# Patient Record
Sex: FEMALE | Race: WHITE | NOT HISPANIC OR LATINO | Employment: OTHER | URBAN - METROPOLITAN AREA
[De-identification: names, ages, dates, MRNs, and addresses within clinical notes are randomized per-mention and may not be internally consistent; named-entity substitution may affect disease eponyms.]

---

## 2017-11-15 ENCOUNTER — CONVERSION ENCOUNTER (OUTPATIENT)
Dept: GENERAL RADIOLOGY | Facility: HOSPITAL | Age: 67
End: 2017-11-15

## 2018-08-16 ENCOUNTER — CONVERSION ENCOUNTER (OUTPATIENT)
Dept: NEUROLOGY | Facility: CLINIC | Age: 68
End: 2018-08-16

## 2018-08-16 ENCOUNTER — OFFICE VISIT CONVERTED (OUTPATIENT)
Dept: NEUROSURGERY | Facility: CLINIC | Age: 68
End: 2018-08-16
Attending: PHYSICIAN ASSISTANT

## 2018-08-29 ENCOUNTER — OFFICE VISIT CONVERTED (OUTPATIENT)
Dept: NEUROSURGERY | Facility: CLINIC | Age: 68
End: 2018-08-29
Attending: PHYSICIAN ASSISTANT

## 2018-09-10 ENCOUNTER — OFFICE VISIT CONVERTED (OUTPATIENT)
Dept: NEUROSURGERY | Facility: CLINIC | Age: 68
End: 2018-09-10
Attending: NEUROLOGICAL SURGERY

## 2018-12-12 ENCOUNTER — CONVERSION ENCOUNTER (OUTPATIENT)
Dept: GENERAL RADIOLOGY | Facility: HOSPITAL | Age: 68
End: 2018-12-12

## 2019-04-02 ENCOUNTER — HOSPITAL ENCOUNTER (OUTPATIENT)
Dept: LAB | Facility: HOSPITAL | Age: 69
Discharge: HOME | End: 2019-04-02
Attending: NURSE PRACTITIONER

## 2019-04-05 ENCOUNTER — HOSPITAL ENCOUNTER (OUTPATIENT)
Dept: OTHER | Facility: HOSPITAL | Age: 69
Discharge: HOME | End: 2019-04-05
Attending: NURSE PRACTITIONER

## 2019-04-09 LAB
AMOXICILLIN+CLAV SUSC ISLT: <=2
AMPICILLIN SUSC ISLT: <=2
AMPICILLIN+SULBAC SUSC ISLT: <=2
BACTERIA UR CULT: ABNORMAL
CEFAZOLIN SUSC ISLT: <=4
CEFEPIME SUSC ISLT: <=1
CEFTAZIDIME SUSC ISLT: <=1
CEFTRIAXONE SUSC ISLT: <=1
CEFUROXIME ORAL SUSC ISLT: 4
CEFUROXIME PARENTER SUSC ISLT: 4
CIPROFLOXACIN SUSC ISLT: <=0.25
ERTAPENEM SUSC ISLT: <=0.5
GENTAMICIN SUSC ISLT: <=1
LEVOFLOXACIN SUSC ISLT: <=0.12
NITROFURANTOIN SUSC ISLT: <=16
TETRACYCLINE SUSC ISLT: <=1
TMP SMX SUSC ISLT: <=20
TOBRAMYCIN SUSC ISLT: <=1

## 2019-07-30 ENCOUNTER — HOSPITAL ENCOUNTER (OUTPATIENT)
Dept: GENERAL RADIOLOGY | Facility: HOSPITAL | Age: 69
Discharge: HOME | End: 2019-07-30
Attending: NURSE PRACTITIONER

## 2019-07-30 ENCOUNTER — HOSPITAL ENCOUNTER (OUTPATIENT)
Dept: LAB | Facility: HOSPITAL | Age: 69
Discharge: HOME | End: 2019-07-30
Attending: NURSE PRACTITIONER

## 2019-07-30 LAB
25(OH)D3 SERPL-MCNC: 75.7 NG/ML (ref 30–100)
ALBUMIN SERPL-MCNC: 4.3 G/DL (ref 3.5–5)
ALBUMIN/GLOB SERPL: 1.4 {RATIO} (ref 1.4–2.6)
ALP SERPL-CCNC: 67 U/L (ref 43–160)
ALT SERPL-CCNC: 31 U/L (ref 10–40)
ANION GAP SERPL CALC-SCNC: 20 MMOL/L (ref 8–19)
AST SERPL-CCNC: 25 U/L (ref 15–50)
BASOPHILS # BLD AUTO: 0.06 10*3/UL (ref 0–0.2)
BASOPHILS NFR BLD AUTO: 1 % (ref 0–3)
BILIRUB SERPL-MCNC: 0.47 MG/DL (ref 0.2–1.3)
BUN SERPL-MCNC: 19 MG/DL (ref 5–25)
BUN/CREAT SERPL: 20 {RATIO} (ref 6–20)
CALCIUM SERPL-MCNC: 9.8 MG/DL (ref 8.7–10.4)
CHLORIDE SERPL-SCNC: 89 MMOL/L (ref 99–111)
CHOLEST SERPL-MCNC: 173 MG/DL (ref 107–200)
CHOLEST/HDLC SERPL: 3.8 {RATIO} (ref 3–6)
CONV ABS IMM GRAN: 0.02 10*3/UL (ref 0–0.2)
CONV CO2: 27 MMOL/L (ref 22–32)
CONV IMMATURE GRAN: 0.3 % (ref 0–1.8)
CONV TOTAL PROTEIN: 7.4 G/DL (ref 6.3–8.2)
CREAT UR-MCNC: 0.95 MG/DL (ref 0.5–0.9)
DEPRECATED RDW RBC AUTO: 40.8 FL (ref 36.4–46.3)
EOSINOPHIL # BLD AUTO: 0.37 10*3/UL (ref 0–0.7)
EOSINOPHIL # BLD AUTO: 5.9 % (ref 0–7)
ERYTHROCYTE [DISTWIDTH] IN BLOOD BY AUTOMATED COUNT: 13.1 % (ref 11.7–14.4)
EST. AVERAGE GLUCOSE BLD GHB EST-MCNC: 120 MG/DL
FOLATE SERPL-MCNC: 12.8 NG/ML (ref 4.8–20)
GFR SERPLBLD BASED ON 1.73 SQ M-ARVRAT: >60 ML/MIN/{1.73_M2}
GLOBULIN UR ELPH-MCNC: 3.1 G/DL (ref 2–3.5)
GLUCOSE SERPL-MCNC: 104 MG/DL (ref 65–99)
HBA1C MFR BLD: 13.9 G/DL (ref 12–16)
HBA1C MFR BLD: 5.8 % (ref 3.5–5.7)
HCT VFR BLD AUTO: 40.7 % (ref 37–47)
HDLC SERPL-MCNC: 45 MG/DL (ref 40–60)
IRON SATN MFR SERPL: 19 % (ref 20–55)
IRON SERPL-MCNC: 79 UG/DL (ref 60–170)
LDLC SERPL CALC-MCNC: 113 MG/DL (ref 70–100)
LYMPHOCYTES # BLD AUTO: 1.8 10*3/UL (ref 1–5)
MCH RBC QN AUTO: 29.3 PG (ref 27–31)
MCHC RBC AUTO-ENTMCNC: 34.2 G/DL (ref 33–37)
MCV RBC AUTO: 85.9 FL (ref 81–99)
MONOCYTES # BLD AUTO: 0.73 10*3/UL (ref 0.2–1.2)
MONOCYTES NFR BLD AUTO: 11.6 % (ref 3–10)
NEUTROPHILS # BLD AUTO: 3.29 10*3/UL (ref 2–8)
NEUTROPHILS NFR BLD AUTO: 52.5 % (ref 30–85)
NRBC CBCN: 0 % (ref 0–0.7)
OSMOLALITY SERPL CALC.SUM OF ELEC: 279 MOSM/KG (ref 273–304)
PLATELET # BLD AUTO: 305 10*3/UL (ref 130–400)
PMV BLD AUTO: 10.8 FL (ref 9.4–12.3)
POTASSIUM SERPL-SCNC: 3.1 MMOL/L (ref 3.5–5.3)
RBC # BLD AUTO: 4.74 10*6/UL (ref 4.2–5.4)
SODIUM SERPL-SCNC: 133 MMOL/L (ref 135–147)
T4 FREE SERPL-MCNC: 1.4 NG/DL (ref 0.9–1.8)
TIBC SERPL-MCNC: 412 UG/DL (ref 245–450)
TRANSFERRIN SERPL-MCNC: 288 MG/DL (ref 250–380)
TRIGL SERPL-MCNC: 75 MG/DL (ref 40–150)
TSH SERPL-ACNC: 1.79 M[IU]/L (ref 0.27–4.2)
URATE SERPL-MCNC: 8.3 MG/DL (ref 2.5–7.5)
VARIANT LYMPHS NFR BLD MANUAL: 28.7 % (ref 20–45)
VIT B12 SERPL-MCNC: 844 PG/ML (ref 211–911)
VLDLC SERPL-MCNC: 15 MG/DL (ref 5–37)
WBC # BLD AUTO: 6.27 10*3/UL (ref 4.8–10.8)

## 2019-08-28 ENCOUNTER — HOSPITAL ENCOUNTER (OUTPATIENT)
Dept: LAB | Facility: HOSPITAL | Age: 69
Discharge: HOME | End: 2019-08-28
Attending: NURSE PRACTITIONER

## 2019-08-28 LAB — POTASSIUM SERPL-SCNC: 3.6 MMOL/L (ref 3.5–5.3)

## 2020-02-03 ENCOUNTER — HOSPITAL ENCOUNTER (OUTPATIENT)
Dept: OTHER | Facility: HOSPITAL | Age: 70
Discharge: HOME | End: 2020-02-03
Attending: FAMILY MEDICINE

## 2020-02-03 ENCOUNTER — OFFICE VISIT CONVERTED (OUTPATIENT)
Dept: FAMILY MEDICINE CLINIC | Age: 70
End: 2020-02-03
Attending: FAMILY MEDICINE

## 2020-02-03 LAB
25(OH)D3 SERPL-MCNC: 90.7 NG/ML (ref 30–100)
ALBUMIN SERPL-MCNC: 4.5 G/DL (ref 3.5–5)
ALBUMIN/GLOB SERPL: 1.3 {RATIO} (ref 1.4–2.6)
ALP SERPL-CCNC: 75 U/L (ref 43–160)
ALT SERPL-CCNC: 23 U/L (ref 10–40)
ANION GAP SERPL CALC-SCNC: 18 MMOL/L (ref 8–19)
AST SERPL-CCNC: 24 U/L (ref 15–50)
BASOPHILS # BLD MANUAL: 0.04 10*3/UL (ref 0–0.2)
BASOPHILS NFR BLD MANUAL: 0.6 % (ref 0–3)
BILIRUB SERPL-MCNC: 0.44 MG/DL (ref 0.2–1.3)
BUN SERPL-MCNC: 13 MG/DL (ref 5–25)
BUN/CREAT SERPL: 14 {RATIO} (ref 6–20)
CALCIUM SERPL-MCNC: 10.1 MG/DL (ref 8.7–10.4)
CHLORIDE SERPL-SCNC: 102 MMOL/L (ref 99–111)
CHOLEST SERPL-MCNC: 180 MG/DL (ref 107–200)
CHOLEST/HDLC SERPL: 4.3 {RATIO} (ref 3–6)
CONV CO2: 25 MMOL/L (ref 22–32)
CONV TOTAL PROTEIN: 8 G/DL (ref 6.3–8.2)
CREAT UR-MCNC: 0.95 MG/DL (ref 0.5–0.9)
DEPRECATED RDW RBC AUTO: 40.8 FL
EOSINOPHIL # BLD MANUAL: 0.28 10*3/UL (ref 0–0.7)
EOSINOPHIL NFR BLD MANUAL: 4.4 % (ref 0–7)
ERYTHROCYTE [DISTWIDTH] IN BLOOD BY AUTOMATED COUNT: 13 % (ref 11.5–14.5)
EST. AVERAGE GLUCOSE BLD GHB EST-MCNC: 120 MG/DL
GFR SERPLBLD BASED ON 1.73 SQ M-ARVRAT: >60 ML/MIN/{1.73_M2}
GLOBULIN UR ELPH-MCNC: 3.5 G/DL (ref 2–3.5)
GLUCOSE SERPL-MCNC: 128 MG/DL (ref 65–99)
GRANS (ABSOLUTE): 3.74 10*3/UL (ref 2–8)
GRANS: 58.4 % (ref 30–85)
HBA1C MFR BLD: 15 G/DL (ref 12–16)
HBA1C MFR BLD: 5.8 % (ref 3.5–5.7)
HCT VFR BLD AUTO: 44.4 % (ref 37–47)
HDLC SERPL-MCNC: 42 MG/DL (ref 40–60)
IMM GRANULOCYTES # BLD: 0 10*3/UL (ref 0–0.54)
IMM GRANULOCYTES NFR BLD: 0 % (ref 0–0.43)
LDLC SERPL CALC-MCNC: 113 MG/DL (ref 70–100)
LYMPHOCYTES # BLD MANUAL: 1.7 10*3/UL (ref 1–5)
LYMPHOCYTES NFR BLD MANUAL: 10.1 % (ref 3–10)
MCH RBC QN AUTO: 29 PG (ref 27–31)
MCHC RBC AUTO-ENTMCNC: 33.8 G/DL (ref 33–37)
MCV RBC AUTO: 85.7 FL (ref 81–99)
MONOCYTES # BLD AUTO: 0.65 10*3/UL (ref 0.2–1.2)
OSMOLALITY SERPL CALC.SUM OF ELEC: 294 MOSM/KG (ref 273–304)
PLATELET # BLD AUTO: 280 10*3/UL (ref 130–400)
PMV BLD AUTO: 10.1 FL (ref 7.4–10.4)
POTASSIUM SERPL-SCNC: 4.1 MMOL/L (ref 3.5–5.3)
RBC # BLD AUTO: 5.18 10*6/UL (ref 4.2–5.4)
SODIUM SERPL-SCNC: 141 MMOL/L (ref 135–147)
TRIGL SERPL-MCNC: 124 MG/DL (ref 40–150)
TSH SERPL-ACNC: 2.5 M[IU]/L (ref 0.27–4.2)
VARIANT LYMPHS NFR BLD MANUAL: 26.5 % (ref 20–45)
VLDLC SERPL-MCNC: 25 MG/DL (ref 5–37)
WBC # BLD AUTO: 6.41 10*3/UL (ref 4.8–10.8)

## 2020-03-02 ENCOUNTER — OFFICE VISIT CONVERTED (OUTPATIENT)
Dept: FAMILY MEDICINE CLINIC | Age: 70
End: 2020-03-02
Attending: FAMILY MEDICINE

## 2020-06-02 ENCOUNTER — HOSPITAL ENCOUNTER (OUTPATIENT)
Dept: LAB | Facility: HOSPITAL | Age: 70
Discharge: HOME | End: 2020-06-02
Attending: FAMILY MEDICINE

## 2020-06-02 LAB
ALBUMIN SERPL-MCNC: 4.4 G/DL (ref 3.5–5)
ALBUMIN/GLOB SERPL: 1.4 {RATIO} (ref 1.4–2.6)
ALP SERPL-CCNC: 51 U/L (ref 43–160)
ALT SERPL-CCNC: 17 U/L (ref 10–40)
ANION GAP SERPL CALC-SCNC: 18 MMOL/L (ref 8–19)
AST SERPL-CCNC: 20 U/L (ref 15–50)
BASOPHILS # BLD AUTO: 0.06 10*3/UL (ref 0–0.2)
BASOPHILS NFR BLD AUTO: 0.9 % (ref 0–3)
BILIRUB SERPL-MCNC: 0.43 MG/DL (ref 0.2–1.3)
BUN SERPL-MCNC: 21 MG/DL (ref 5–25)
BUN/CREAT SERPL: 18 {RATIO} (ref 6–20)
CALCIUM SERPL-MCNC: 10.2 MG/DL (ref 8.7–10.4)
CHLORIDE SERPL-SCNC: 98 MMOL/L (ref 99–111)
CHOLEST SERPL-MCNC: 188 MG/DL (ref 107–200)
CHOLEST/HDLC SERPL: 4.8 {RATIO} (ref 3–6)
CONV ABS IMM GRAN: 0.01 10*3/UL (ref 0–0.2)
CONV CO2: 26 MMOL/L (ref 22–32)
CONV CREATININE URINE, RANDOM: 74.4 MG/DL (ref 10–300)
CONV IMMATURE GRAN: 0.2 % (ref 0–1.8)
CONV MICROALBUM.,U,RANDOM: <12 MG/L (ref 0–20)
CONV TOTAL PROTEIN: 7.5 G/DL (ref 6.3–8.2)
CREAT UR-MCNC: 1.17 MG/DL (ref 0.5–0.9)
DEPRECATED RDW RBC AUTO: 44.4 FL (ref 36.4–46.3)
EOSINOPHIL # BLD AUTO: 0.36 10*3/UL (ref 0–0.7)
EOSINOPHIL # BLD AUTO: 5.5 % (ref 0–7)
ERYTHROCYTE [DISTWIDTH] IN BLOOD BY AUTOMATED COUNT: 13.5 % (ref 11.7–14.4)
EST. AVERAGE GLUCOSE BLD GHB EST-MCNC: 123 MG/DL
GFR SERPLBLD BASED ON 1.73 SQ M-ARVRAT: 48 ML/MIN/{1.73_M2}
GLOBULIN UR ELPH-MCNC: 3.1 G/DL (ref 2–3.5)
GLUCOSE SERPL-MCNC: 137 MG/DL (ref 65–99)
HBA1C MFR BLD: 5.9 % (ref 3.5–5.7)
HCT VFR BLD AUTO: 40.8 % (ref 37–47)
HDLC SERPL-MCNC: 39 MG/DL (ref 40–60)
HGB BLD-MCNC: 13.4 G/DL (ref 12–16)
LDLC SERPL CALC-MCNC: 126 MG/DL (ref 70–100)
LYMPHOCYTES # BLD AUTO: 1.94 10*3/UL (ref 1–5)
LYMPHOCYTES NFR BLD AUTO: 29.6 % (ref 20–45)
MCH RBC QN AUTO: 29.5 PG (ref 27–31)
MCHC RBC AUTO-ENTMCNC: 32.8 G/DL (ref 33–37)
MCV RBC AUTO: 89.9 FL (ref 81–99)
MICROALBUMIN/CREAT UR: 16.1 MG/G{CRE} (ref 0–35)
MONOCYTES # BLD AUTO: 0.77 10*3/UL (ref 0.2–1.2)
MONOCYTES NFR BLD AUTO: 11.7 % (ref 3–10)
NEUTROPHILS # BLD AUTO: 3.42 10*3/UL (ref 2–8)
NEUTROPHILS NFR BLD AUTO: 52.1 % (ref 30–85)
NRBC CBCN: 0 % (ref 0–0.7)
OSMOLALITY SERPL CALC.SUM OF ELEC: 291 MOSM/KG (ref 273–304)
PLATELET # BLD AUTO: 291 10*3/UL (ref 130–400)
PMV BLD AUTO: 10.8 FL (ref 9.4–12.3)
POTASSIUM SERPL-SCNC: 3.8 MMOL/L (ref 3.5–5.3)
RBC # BLD AUTO: 4.54 10*6/UL (ref 4.2–5.4)
SODIUM SERPL-SCNC: 138 MMOL/L (ref 135–147)
TRIGL SERPL-MCNC: 116 MG/DL (ref 40–150)
TSH SERPL-ACNC: 3.12 M[IU]/L (ref 0.27–4.2)
VLDLC SERPL-MCNC: 23 MG/DL (ref 5–37)
WBC # BLD AUTO: 6.56 10*3/UL (ref 4.8–10.8)

## 2020-06-05 ENCOUNTER — OFFICE VISIT CONVERTED (OUTPATIENT)
Dept: FAMILY MEDICINE CLINIC | Age: 70
End: 2020-06-05
Attending: FAMILY MEDICINE

## 2020-09-29 ENCOUNTER — HOSPITAL ENCOUNTER (OUTPATIENT)
Dept: GENERAL RADIOLOGY | Facility: HOSPITAL | Age: 70
Discharge: HOME | End: 2020-09-29
Attending: FAMILY MEDICINE

## 2020-11-25 ENCOUNTER — HOSPITAL ENCOUNTER (OUTPATIENT)
Dept: LAB | Facility: HOSPITAL | Age: 70
Discharge: HOME | End: 2020-11-25
Attending: FAMILY MEDICINE

## 2020-11-25 LAB
ALBUMIN SERPL-MCNC: 4.2 G/DL (ref 3.5–5)
ALBUMIN/GLOB SERPL: 1.2 {RATIO} (ref 1.4–2.6)
ALP SERPL-CCNC: 59 U/L (ref 43–160)
ALT SERPL-CCNC: 16 U/L (ref 10–40)
ANION GAP SERPL CALC-SCNC: 21 MMOL/L (ref 8–19)
AST SERPL-CCNC: 19 U/L (ref 15–50)
BASOPHILS # BLD AUTO: 0.05 10*3/UL (ref 0–0.2)
BASOPHILS NFR BLD AUTO: 0.7 % (ref 0–3)
BILIRUB SERPL-MCNC: 0.34 MG/DL (ref 0.2–1.3)
BUN SERPL-MCNC: 25 MG/DL (ref 5–25)
BUN/CREAT SERPL: 22 {RATIO} (ref 6–20)
CALCIUM SERPL-MCNC: 10.9 MG/DL (ref 8.7–10.4)
CHLORIDE SERPL-SCNC: 98 MMOL/L (ref 99–111)
CHOLEST SERPL-MCNC: 181 MG/DL (ref 107–200)
CHOLEST/HDLC SERPL: 4 {RATIO} (ref 3–6)
CONV ABS IMM GRAN: 0.03 10*3/UL (ref 0–0.2)
CONV CO2: 26 MMOL/L (ref 22–32)
CONV IMMATURE GRAN: 0.4 % (ref 0–1.8)
CONV TOTAL PROTEIN: 7.6 G/DL (ref 6.3–8.2)
CREAT UR-MCNC: 1.13 MG/DL (ref 0.5–0.9)
DEPRECATED RDW RBC AUTO: 44.7 FL (ref 36.4–46.3)
EOSINOPHIL # BLD AUTO: 0.4 10*3/UL (ref 0–0.7)
EOSINOPHIL # BLD AUTO: 5.4 % (ref 0–7)
ERYTHROCYTE [DISTWIDTH] IN BLOOD BY AUTOMATED COUNT: 14 % (ref 11.7–14.4)
EST. AVERAGE GLUCOSE BLD GHB EST-MCNC: 134 MG/DL
GFR SERPLBLD BASED ON 1.73 SQ M-ARVRAT: 49 ML/MIN/{1.73_M2}
GLOBULIN UR ELPH-MCNC: 3.4 G/DL (ref 2–3.5)
GLUCOSE SERPL-MCNC: 121 MG/DL (ref 65–99)
HBA1C MFR BLD: 6.3 % (ref 3.5–5.7)
HCT VFR BLD AUTO: 40 % (ref 37–47)
HDLC SERPL-MCNC: 45 MG/DL (ref 40–60)
HGB BLD-MCNC: 13.5 G/DL (ref 12–16)
LDLC SERPL CALC-MCNC: 108 MG/DL (ref 70–100)
LYMPHOCYTES # BLD AUTO: 2 10*3/UL (ref 1–5)
LYMPHOCYTES NFR BLD AUTO: 26.8 % (ref 20–45)
MCH RBC QN AUTO: 30.1 PG (ref 27–31)
MCHC RBC AUTO-ENTMCNC: 33.8 G/DL (ref 33–37)
MCV RBC AUTO: 89.1 FL (ref 81–99)
MONOCYTES # BLD AUTO: 0.75 10*3/UL (ref 0.2–1.2)
MONOCYTES NFR BLD AUTO: 10.1 % (ref 3–10)
NEUTROPHILS # BLD AUTO: 4.22 10*3/UL (ref 2–8)
NEUTROPHILS NFR BLD AUTO: 56.6 % (ref 30–85)
NRBC CBCN: 0 % (ref 0–0.7)
OSMOLALITY SERPL CALC.SUM OF ELEC: 298 MOSM/KG (ref 273–304)
PLATELET # BLD AUTO: 280 10*3/UL (ref 130–400)
PMV BLD AUTO: 10.7 FL (ref 9.4–12.3)
POTASSIUM SERPL-SCNC: 3.6 MMOL/L (ref 3.5–5.3)
RBC # BLD AUTO: 4.49 10*6/UL (ref 4.2–5.4)
SODIUM SERPL-SCNC: 141 MMOL/L (ref 135–147)
TRIGL SERPL-MCNC: 142 MG/DL (ref 40–150)
TSH SERPL-ACNC: 3.33 M[IU]/L (ref 0.27–4.2)
VLDLC SERPL-MCNC: 28 MG/DL (ref 5–37)
WBC # BLD AUTO: 7.45 10*3/UL (ref 4.8–10.8)

## 2020-12-02 ENCOUNTER — OFFICE VISIT CONVERTED (OUTPATIENT)
Dept: FAMILY MEDICINE CLINIC | Age: 70
End: 2020-12-02
Attending: NURSE PRACTITIONER

## 2021-01-05 ENCOUNTER — HOSPITAL ENCOUNTER (OUTPATIENT)
Dept: LAB | Facility: HOSPITAL | Age: 71
Discharge: HOME | End: 2021-01-05
Attending: NURSE PRACTITIONER

## 2021-01-05 LAB
ALBUMIN SERPL-MCNC: 4.1 G/DL (ref 3.5–5)
ALBUMIN/GLOB SERPL: 1.2 {RATIO} (ref 1.4–2.6)
ALP SERPL-CCNC: 59 U/L (ref 43–160)
ALT SERPL-CCNC: 18 U/L (ref 10–40)
ANION GAP SERPL CALC-SCNC: 17 MMOL/L (ref 8–19)
AST SERPL-CCNC: 20 U/L (ref 15–50)
BILIRUB SERPL-MCNC: 0.33 MG/DL (ref 0.2–1.3)
BUN SERPL-MCNC: 28 MG/DL (ref 5–25)
BUN/CREAT SERPL: 25 {RATIO} (ref 6–20)
CALCIUM SERPL-MCNC: 10.3 MG/DL (ref 8.7–10.4)
CHLORIDE SERPL-SCNC: 96 MMOL/L (ref 99–111)
CHOLEST SERPL-MCNC: 124 MG/DL (ref 107–200)
CHOLEST/HDLC SERPL: 2.9 {RATIO} (ref 3–6)
CONV CO2: 27 MMOL/L (ref 22–32)
CONV TOTAL PROTEIN: 7.6 G/DL (ref 6.3–8.2)
CREAT UR-MCNC: 1.13 MG/DL (ref 0.5–0.9)
GFR SERPLBLD BASED ON 1.73 SQ M-ARVRAT: 49 ML/MIN/{1.73_M2}
GLOBULIN UR ELPH-MCNC: 3.5 G/DL (ref 2–3.5)
GLUCOSE SERPL-MCNC: 124 MG/DL (ref 65–99)
HDLC SERPL-MCNC: 43 MG/DL (ref 40–60)
LDLC SERPL CALC-MCNC: 58 MG/DL (ref 70–100)
OSMOLALITY SERPL CALC.SUM OF ELEC: 289 MOSM/KG (ref 273–304)
POTASSIUM SERPL-SCNC: 3.6 MMOL/L (ref 3.5–5.3)
SODIUM SERPL-SCNC: 136 MMOL/L (ref 135–147)
TRIGL SERPL-MCNC: 115 MG/DL (ref 40–150)
TSH SERPL-ACNC: 2.44 M[IU]/L (ref 0.27–4.2)
VLDLC SERPL-MCNC: 23 MG/DL (ref 5–37)

## 2021-03-15 ENCOUNTER — HOSPITAL ENCOUNTER (OUTPATIENT)
Dept: VACCINE CLINIC | Facility: HOSPITAL | Age: 71
Discharge: HOME | End: 2021-03-15
Attending: INTERNAL MEDICINE

## 2021-03-16 ENCOUNTER — OFFICE VISIT CONVERTED (OUTPATIENT)
Dept: FAMILY MEDICINE CLINIC | Age: 71
End: 2021-03-16
Attending: NURSE PRACTITIONER

## 2021-04-05 ENCOUNTER — HOSPITAL ENCOUNTER (OUTPATIENT)
Dept: VACCINE CLINIC | Facility: HOSPITAL | Age: 71
Discharge: HOME | End: 2021-04-05
Attending: INTERNAL MEDICINE

## 2021-05-04 ENCOUNTER — HOSPITAL ENCOUNTER (OUTPATIENT)
Dept: LAB | Facility: HOSPITAL | Age: 71
Discharge: HOME | End: 2021-05-04
Attending: NURSE PRACTITIONER

## 2021-05-04 LAB
EST. AVERAGE GLUCOSE BLD GHB EST-MCNC: 117 MG/DL
HBA1C MFR BLD: 5.7 % (ref 3.5–5.7)
TSH SERPL-ACNC: 3.09 M[IU]/L (ref 0.27–4.2)

## 2021-05-16 VITALS
WEIGHT: 232 LBS | DIASTOLIC BLOOD PRESSURE: 88 MMHG | SYSTOLIC BLOOD PRESSURE: 152 MMHG | BODY MASS INDEX: 43.8 KG/M2 | HEIGHT: 61 IN

## 2021-05-16 VITALS
DIASTOLIC BLOOD PRESSURE: 72 MMHG | SYSTOLIC BLOOD PRESSURE: 137 MMHG | BODY MASS INDEX: 42.48 KG/M2 | HEIGHT: 61 IN | HEART RATE: 86 BPM | WEIGHT: 225 LBS

## 2021-05-16 VITALS
HEART RATE: 77 BPM | WEIGHT: 225 LBS | HEIGHT: 61 IN | SYSTOLIC BLOOD PRESSURE: 146 MMHG | DIASTOLIC BLOOD PRESSURE: 79 MMHG | BODY MASS INDEX: 42.48 KG/M2

## 2021-05-18 NOTE — PROGRESS NOTES
Kim Owen  1950     Office/Outpatient Visit    Visit Date: Wed, Dec 2, 2020 11:10 am    Provider: Judie Palomo N.P. (Assistant: Kaila Montero MA)    Location: [facility]        Electronically signed by Judie Palomo N.P. on  12/02/2020 12:57:06 PM                             Subjective:        CC: Mrs. Owen is a 70 year old White female.  Medicare Wellness;         HPI:           Mrs. Owen is here for a Medicare wellness visit.  The required HRA questions are integrated within this visit note. Family medical history and individual medical/surgical history were reviewed and updated.  A current height, weight, BMI, blood pressure, and pulse were recorded in the vitals section of the note and have been reviewed. Patient's medications, including supplements, were recorded in the chart and reviewed.  Current providers and suppliers were reviewed and updated.          Self-Assessment of Health: She rates her health as good. She rates her confidence of being able to control/manage most of her health problems as very confident. Her physical/emotional health has limited her social activites not at all.  A review of cognitive impairment was performed, including ability to drive a car, manage finances, and any memory changes, and was found to be negative.  A review of functional ability, including bathing, dressing, walking, and urine/bowel continence as well as level of safety was performed and was found to be negative.  Falls Risk: Has not had any falls or only one fall without injury in the past year.  Concerning home safety, she reports that at home she DOES have adequate lighting, a skid resistant shower/tub, grab bars in the bath, handrails on stairs, functioning smoke alarms and absence of throw rugs.          Immunization Status: Would like shingles and influenza today; Physical Activity: She exercises for at least 20 minutes 3 or more days/week.; Type of diet  patient normally eats is described as well-balanced with fruits and vegetables Tobacco: She has never smoked.  Preventative Health updated today           PHQ-9 Depression Screening: Completed form scanned and in chart; Total Score 3     ROS:     CONSTITUTIONAL:  Negative for fatigue and fever.      E/N/T:  Negative for diminished hearing and nasal congestion.      CARDIOVASCULAR:  Negative for chest pain, palpitations, tachycardia, orthopnea, and edema.      RESPIRATORY:  Negative for recent cough, dyspnea and frequent wheezing.      GASTROINTESTINAL:  Negative for abdominal pain, constipation, diarrhea, nausea and vomiting.      GENITOURINARY:  Negative for dysuria and hematuria.      MUSCULOSKELETAL:  Positive for arthralgias (hx OA).   Negative for myalgias.      INTEGUMENTARY/BREAST:  Positive for rash shingles on left shoulder and neck x 1 week , been on Valcylovir from [facility] , area is some better but rash is still there and its painful and burns , never had shingles vaccine.   Negative for atypical mole(s).      NEUROLOGICAL:  Negative for dizziness, memory loss, paresthesias, tremor and weakness.      ENDOCRINE:  Positive for Hx borderline DM, last A1C 11/2020 was 6. 3 , taking Metformin daily and hx Hypothroidism for many years, taking 25mcg LT4 but still fatigued.      PSYCHIATRIC:  Negative for anxiety, depression, and sleep disturbances.          Past Medical History / Family History / Social History:         Last Reviewed on 3/02/2020 09:49 AM by Leonardo Turner    Past Medical History:             PAST MEDICAL HISTORY         Hypertension    Hypothyroidism     Chronic Pain: Back pain;     [place] Spotted Fever     Depression         PREVENTIVE HEALTH MAINTENANCE             BONE DENSITY: was last done 7/30/19 with the following abnormality noted-- Osteopenia     COLORECTAL CANCER SCREENING: Up to date (colonoscopy q10y; sigmoidoscopy q5y; Cologuard q3y) was last done 2012; colonoscopy with normal  results     MAMMOGRAM: Done within last 2 years and results in are chart was last done 20 with normal results     PAP SMEAR: No longer indicated due to age and history         Surgical History:         Hysterectomy: at age 48; endometriosis and menorrhagia;     Joint Replacement: right knee; 2016;         Family History:     Father:  at age 66; Cause of death was DM 2 and alcoholism     Mother:  at age 86; Cause of death was old age         Social History:     Occupation: Retired (Prior occupation: [employer] - night )     Marital Status:      Children: None     Hobbies/Recreation: she enjoys reading and kierra;     Exercise: Primary form of exercise is eliptical.   limited by back pain         Tobacco/Alcohol/Supplements:     Last Reviewed on 2020 08:24 AM by Maria Luz Overton    Tobacco: She has never smoked.  Non-drinker     Caffeine:  She admits to consuming caffeine via coffee ( 2 servings per day ).          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         Current Medications:     Last Reviewed on 6/05/2020 08:25 AM by Maria Luz Overton    Clindamycin  [300mg PRN]    potassium chloride 10 mEq oral tablet, extended release [take 1 tablet (10 meq) by oral route twice a day ]    Triamcinolone Acetate     cyclobenzaprine 5 mg oral tablet [take 1 tablet (5 mg) by oral route prn ]    traMADol 50 mg oral tablet [take 1 tablet (50 mg) by oral route every 4 hours as needed]    gabapentin 100 mg oral capsule [take 1 capsule (100 mg) by oral route prn ]    cholecalciferol (vitamin D3) 125 mcg (5,000 unit) oral tablet    Vitamin C  1000 mg     multivitamin     metoprolol succinate 25 mg oral Tablet, Extended Release 24 hr [Take 1 tablet by mouth once daily]    chlorthalidone 25 mg oral tablet [take 1 tablet (25 mg) by oral route once daily]    losartan 100 mg oral tablet [take 1 tablet (100 mg) by oral route once daily]    buPROPion  mg oral tablet [take 1 tablet (100 mg) by oral route once a day ]    meloxicam 15 mg oral tablet [take 1 tablet (15 mg) by oral route once daily as needed ]    levothyroxine 25 mcg oral tablet [take 1 tablet (25 mcg) by oral route once daily]    metFORMIN 500 mg oral tablet [Take 1 tablet by mouth twice daily]        Objective:        Vitals:         Current: 12/2/2020 11:21:21 AM    Ht:  5 ft, 0 in;  Wt: 229.6 lbs;  BMI: 44.8T: 97.1 F (temporal);  BP: 132/107 mm Hg (left arm, sitting);  P: 85 bpm (right arm (BP Cuff), sitting);  sCr: 1.13 mg/dL;  GFR: 53.58VA: 20/25 OD, 20/40 OS (near, with correction)        Repeat:     11:22:53 AM  BP:   137/79mm Hg (right arm, sitting, Pulse 79)     Exams:     PHYSICAL EXAM:     GENERAL: vital signs recorded - well developed, well nourished;  well groomed;  no apparent distress;     NECK:  supple, full ROM; no thyromegaly; no carotid bruits;     RESPIRATORY: normal respiratory rate and pattern with no distress; normal breath sounds with no rales, rhonchi, wheezes or rubs;     CARDIOVASCULAR: normal rate;  rhythm is regular;  no systolic murmur; no edema;     GASTROINTESTINAL: nontender, nondistended; no hepatosplenomegaly or masses; no bruits;     BREAST/INTEGUMENT: SKIN: no significant rashes or lesions; no suspicious moles; Herpes zoster of left shoulder and neck;     MUSCULOSKELETAL: Generalized joint tenderness with FROM     NEUROLOGIC: GROSSLY INTACT     PSYCHIATRIC:  appropriate affect and demeanor; normal speech pattern; grossly normal memory;         Procedures:     Encounter for immunization    Regarding contraindications to an Influenza vaccine: Denies moderate/severe illness with/without fever; serious reaction to eggs, egg proteins, gentamicin, gelatin, arginine, neomycin or polymixin; serious reaction after recieving previous influenza vaccines; and history of Guillain-Fisher Syndrome.              Assessment:         Z00.00   Encounter for general adult medical examination without abnormal findings       Z13.31   Encounter for screening for depression       Z23   Encounter for immunization       E03.9   Hypothyroidism, unspecified       M85.80   Other specified disorders of bone density and structure, unspecified site       B02.9   Zoster without complications       E78.5   Hyperlipidemia, unspecified       M15.0   Primary generalized (osteo)arthritis       Z79.899   Other long term (current) drug therapy           ORDERS:         Meds Prescribed:       [New Rx] Shingrix (PF) 50 mcg/0.5 mL intramuscular Suspension for Reconstitution [inject 0.5 milliliter (50 mcg) by intramuscular route once], #1 (one) kit, Refills: 1 (one)       [Refilled] levothyroxine 50 mcg oral tablet [take 1 tablet (50 mcg) by oral route once daily], #90 (ninety) tablets, Refills: 1 (one)       [New Rx] valACYclovir 1 gram oral tablet [take 1 tablet (1,000 mg) by oral route 2 times per day], #20 (twenty) tablets, Refills: 1 (one)       [New Rx] calcium carbonate-vitamin D3 500 mg(1,250mg) -200 unit oral tablet [take 1 tablet bid ,  substitute per insurance preference ], #180 (one hundred and eighty) tablets, Refills: 3 (three)       [New Rx] gabapentin 100 mg oral capsule [take 1 capsule (100 mg) by oral route 2 times daily prn pain ], #60 (sixty) capsules, Refills: 0 (zero)       [Refilled] traMADol 50 mg oral tablet [take 1 tablet (50 mg) by oral route every bid prn pain ], #60 (sixty) tablets, Refills: 2 (two)       [New Rx] atorvastatin 10 mg oral tablet [1 tab daily at bedtime], #90 (ninety) tablets, Refills: 3 (three)         Radiology/Test Orders:       3017F  Colorectal CA screen results documented and reviewed (PV)  (In-House)            2022F  Dilated retinal eye exam w/interpret by ophthalmologist/optometrist documented/reviewed (DM)4  (In-House)              Lab Orders:       FUTURE  Future order to be done at patients convenience  (Send-Out)            35853  [facility] TSH  (Send-Out)            32548  [facility] CMP AND LIPID: [id number], [id number]  (Send-Out)            APPTO  Appointment need  (In-House)              Procedures Ordered:       84280  Fluzone High Dose  (In-House)            10041  Pneumococcal polysaccharide vaccine, 23-valent, adult or immunosuppressed patient dosage, for use in individuals 2 years or older, for subcutaneous or intramuscular use  (In-House)            08200  Immunization administration; one vaccine  (In-House)            88785  Vaccination administration, each additional  (In-House)              Annual wellness visit, includes a PPPS, first visit  (In-House)              Other Orders:         Depression screen negative  (In-House)            1101F  Pt screen for fall risk; document no falls in past year or only 1 fall w/o injury in past year (HORTENCIA)  (In-House)              Screening mammogram results documented  (Send-Out)              Administration of influenza virus vaccine  (x1)          Administration of pneumococcal vaccine  (x1)          Administration of  pneumococcal vaccine  (x1)                  Plan:         Encounter for general adult medical examination without abnormal findings    MIPS Has had no falls or only one fall without injury in the past year Vaccines Flu and Pneumonia updated in Shot record Screening mammomgram done within last 2 years and results in are chart Colorectal Cancer Screening is up to date and the results are in the chart Diabetic Eye Exam during this calendar year and results are in chart ADVANCE DIRECTIVES (Please update in PMH): Living will Anali Schofield           Orders:       1101F  Pt screen for fall risk; document no falls in past year or only 1 fall w/o injury in past year (HORTENCIA)  (In-House)              Screening mammogram results documented  (Send-Out)            3017F  Colorectal CA screen results documented and reviewed (PV)  (In-House)            2022F  Dilated retinal eye exam w/interpret by ophthalmologist/optometrist documented/reviewed (DM)4  (In-House)              Annual wellness visit, includes a PPPS, first visit  (In-House)              Encounter for screening for depression    MIPS PHQ-9 Depression Screening: Completed form scanned and in chart; Total Score 3; Negative Depression Screen           Orders:         Depression screen negative  (In-House)              Encounter for immunization        IMMUNIZATIONS given today: Influenza HIGH Dose and Pneumovax.            Prescriptions:       [New Rx] Shingrix (PF) 50 mcg/0.5 mL intramuscular Suspension for Reconstitution [inject 0.5 milliliter (50 mcg) by intramuscular route once], #1 (one) kit, Refills: 1 (one)           Immunizations:       54992  Fluzone High Dose  (In-House)                Dose (ml): 0.7  Site: left arm  Route: intramuscular  Administered by: Delores Khan          : [employer]  Lot #: [id number]  Exp: 06/30/2021          NDC: [address]-73        Administration of influenza virus vaccine  (x1)        93846   Pneumococcal polysaccharide vaccine, 23-valent, adult or immunosuppressed patient dosage, for use in individuals 2 years or older, for subcutaneous or intramuscular use  (In-House)                Dose (ml): 0.5  Site: left deltoid  Route: intramuscular  Administered by: Delores Khan          : [employer]  Lot #: [id number]  Exp: 05/08/2022          NDC: [address]-73        Administration of pneumococcal vaccine  (x1)        27992  Immunization administration; one vaccine  (In-House)              Administration of pneumococcal vaccine  (x1)        96870  Vaccination administration, each additional  (In-House)              Hypothyroidism, unspecifiedRepeat TSH in 8 weeks, order given         FOLLOW-UP TESTING #1: FOLLOW-UP LABORATORY:  Labs to be scheduled in the future include TSH.            Prescriptions:       [Refilled] levothyroxine 50 mcg oral tablet [take 1 tablet (50 mcg) by oral route once daily], #90 (ninety) tablets, Refills: 1 (one)           Orders:       FUTURE  Future order to be done at patients convenience  (Send-Out)            78444  [facility] TSH  (Send-Out)              Other specified disorders of bone density and structure, unspecified siteRepeat Dexa in 2021         FOLLOW-UP: Schedule follow-up appointments on a p.r.n. basis.:Schedule a follow-up visit in 3 months.:.:for labs in 8 weeks           Prescriptions:       [New Rx] calcium carbonate-vitamin D3 500 mg(1,250mg) -200 unit oral tablet [take 1 tablet bid , substitute per insurance preference ], #180 (one hundred and eighty) tablets, Refills: 3 (three)           Orders:       APPTO  Appointment need  (In-House)              Zoster without complications          Prescriptions:       [New Rx] valACYclovir 1 gram oral tablet [take 1 tablet (1,000 mg) by oral route 2 times per day], #20 (twenty) tablets, Refills: 1 (one)       [New Rx] gabapentin 100 mg oral capsule [take 1 capsule (100 mg) by oral route 2  times daily prn pain ], #60 (sixty) capsules, Refills: 0 (zero)         Hyperlipidemia, unspecified          Prescriptions:       [New Rx] atorvastatin 10 mg oral tablet [1 tab daily at bedtime], #90 (ninety) tablets, Refills: 3 (three)           Orders:       71289  [facility] CMP AND LIPID: [id number], [id number]  (Send-Out)              Primary generalized (osteo)arthritis          Prescriptions:       [Refilled] traMADol 50 mg oral tablet [take 1 tablet (50 mg) by oral route every bid prn pain ], #60 (sixty) tablets, Refills: 2 (two)         Other long term (current) drug therapy    Controlled substance documentation: Sean reviewed; prior drug screen consistent; consent is reviewed and signed and on the chart.  She is aware of risk of addiction on this medication, understands that she will need to follow up for a review every 3 months and her medications will be adjusted or decreased as deemed appropriate at each visit.  No history of drug or alcohol abuse.  No concerns about diversion or abuse. She denies side effects related to the medication.  She is aware that she may be called in for pill counts.  The dosing of this medication will be reviewed on a regular basis and reduced if possible..  Ongoing use of a controlled substance is necessary for this patient to have a normal quality of life             Patient Recommendations:        For  Encounter for general adult medical examination without abnormal findings:    I also recommend Anali Schofield.          For  Hypothyroidism, unspecified:            The following laboratory testing has been ordered: TSH         For  Other specified disorders of bone density and structure, unspecified site:    Schedule follow-up appointments as needed. Schedule a follow-up visit in 3 months.                APPOINTMENT INFORMATION:        Monday Tuesday Wednesday Thursday Friday Saturday Sunday            Time:___________________AM  PM   Date:_____________________              Charge Capture:         Primary Diagnosis:     Z00.00  Encounter for general adult medical examination without abnormal findings           Orders:      36174  Preventive medicine, established patient, age 65+ years  (In-House)            1101F  Pt screen for fall risk; document no falls in past year or only 1 fall w/o injury in past year (HORTENCIA)  (In-House)            3017F  Colorectal CA screen results documented and reviewed (PV)  (In-House)            2022F  Dilated retinal eye exam w/interpret by ophthalmologist/optometrist documented/reviewed (DM)4  (In-House)              Annual wellness visit, includes a PPPS, first visit  (In-House)              Z13.31  Encounter for screening for depression           Orders:      32072-77  Office/outpatient visit; established patient, level 3  (In-House)              Depression screen negative  (In-House)              Z23  Encounter for immunization           Orders:      36899  Fluzone High Dose  (In-House)              Administration of influenza virus vaccine  (x1)        89022  Pneumococcal polysaccharide vaccine, 23-valent, adult or immunosuppressed patient dosage, for use in individuals 2 years or older, for subcutaneous or intramuscular use  (In-House)              Administration of pneumococcal vaccine  (x1)        71925  Immunization administration; one vaccine  (In-House)              Administration of pneumococcal vaccine  (x1)        71680  Vaccination administration, each additional  (In-House)              E03.9  Hypothyroidism, unspecified     M85.80  Other specified disorders of bone density and structure, unspecified site           Orders:      APPTO  Appointment need  (In-House)              B02.9  Zoster without complications     E78.5  Hyperlipidemia, unspecified     M15.0  Primary generalized (osteo)arthritis     Z79.899  Other long term (current) drug therapy

## 2021-05-18 NOTE — PROGRESS NOTES
Kim Owen  1950     Office/Outpatient Visit    Visit Date: Mon, Feb 3, 2020 09:52 am    Provider: Leonardo Turner MD (Assistant: Magali Delgado MA)    Location: [facility]        Electronically signed by Leonardo Turner MD on  02/03/2020 07:25:26 PM                             Subjective:        CC: Ms. Owen is a 69 year old White female.  This is her first visit to the clinic.  establish care;         HPI: Pt has been seeing mari Jones in [place].           PHQ-9 Depression Screening: Completed form scanned and in chart; Total Score 4       BP today is 162/85 with a HR of 88. Recheck is 154/90 with a HR of 88. She is on losartan 100 mg qd, chlorthalidone 25 mg qd. She checks it rarely and its often 140s/70s. She denies headache or blurry vision.      Pt has hypothyroidism and is on levothyroxine 25 mcg/day. No specific Dx. She takes this at 3am when she gets up to pee.      Pt has chronic low back pain. She has been to PT a little less than a year ago and in 2018 as well. Dry needling seemed to help. She has also been to pain management over a year ago and steroid injections did not help. She saw a surgeon, Yordan Julien in [facility] who recommended weight loss. She is on tramadol 50 mg qd and meloxicam 15 mg qd. She is on flexeril 5 mg qHS if pain is preventing her from sleeping. She was previously on gabapentin 100 mg BID and this helped her inflammation but she worries it made her forgetful. She stopped this about a month ago.      Pt has depression and is on buproprion 100 mg BID. She feels like this is helping although she wonders if once a day would be fine.       Pt was told she has vitamin D deficiency, she is on OTC vitamin D supplement.       Pt was Dx'd with [place] Spotted Fever about 7 years ago. She was bit by a tick, she did not get a rash but felt sick, headache, and aches and pain. Labs showed RMSF. She is on clindamycin as needed although she  has not needed this over the last 7 years.       A1c was 5.8 on 19. She is unaware that she has pre-diabetes. She has never been on metformin. She cut out sugar from her diet in 2017 after Dr. Julien said she needed to lose weight for her back pain.     ROS:     CONSTITUTIONAL:  Negative for fatigue and fever.      EYES:  Negative for blurred vision.      E/N/T:  Negative for diminished hearing and nasal congestion.      CARDIOVASCULAR:  Negative for chest pain and palpitations.      RESPIRATORY:  Negative for recent cough and dyspnea.      GASTROINTESTINAL:  Negative for abdominal pain, constipation, diarrhea, nausea and vomiting.      GENITOURINARY:  Negative for dysuria and urinary incontinence.      MUSCULOSKELETAL:  Positive for back pain.   Negative for arthralgias or myalgias.      INTEGUMENTARY/BREAST:  Negative for atypical mole(s) and rash.      NEUROLOGICAL:  Negative for paresthesias and weakness.      PSYCHIATRIC:  Positive for depression.   Negative for anxiety or sleep disturbance.          Past Medical History / Family History / Social History:         Last Reviewed on 2020 10:51 AM by Leonardo Turner    Past Medical History:             PAST MEDICAL HISTORY         Hypertension    Hypothyroidism     Chronic Pain: Back pain;     [place] Spotted Fever     Depression         PREVENTIVE HEALTH MAINTENANCE             BONE DENSITY: was last done 19 with the following abnormality noted-- Osteopenia     COLORECTAL CANCER SCREENING: Up to date (colonoscopy q10y; sigmoidoscopy q5y; Cologuard q3y) was last done ; colonoscopy with normal results     MAMMOGRAM: was last done 2018 with normal results     PAP SMEAR: No longer indicated due to age and history         Surgical History:         Hysterectomy: at age 48; endometriosis and menorrhagia;     Joint Replacement: right knee; 2016;         Family History:     Father:  at age 66; Cause of death was DM 2 and alcoholism      Mother:  at age 86; Cause of death was old age         Social History:     Occupation: Retired (Prior occupation: [employer] - night )     Marital Status:      Children: None     Hobbies/Recreation: she enjoys reading and kierra;     Exercise: Primary form of exercise is eliptical.   limited by back pain         Tobacco/Alcohol/Supplements:     Last Reviewed on 2020 10:51 AM by Leonardo Turner    Tobacco: She has never smoked.  Non-drinker     Caffeine:  She admits to consuming caffeine via coffee ( 2 servings per day ).          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        Current Medications:     Last Reviewed on 2020 10:51 AM by Leonardo Turner    Clindamycin  [300mg PRN]    levothyroxine 25 mcg oral tablet [take 1 tablet (25 mcg) by oral route once daily]    potassium chloride 10 mEq oral tablet, extended release [take 1 tablet (10 meq) by oral route twice a day ]    Triamcinolone Acetate     cyclobenzaprine 5 mg oral tablet [take 1 tablet (5 mg) by oral route prn ]    meloxicam 15 mg oral tablet [take 1 tablet (15 mg) by oral route once daily]    traMADol 50 mg oral tablet [take 1 tablet (50  mg) by oral route every 4 hours as needed]    gabapentin 100 mg oral capsule [take 1 capsule (100 mg) by oral route prn ]    cholecalciferol (vitamin D3) 125 mcg (5,000 unit) oral tablet    Vitamin C  1000 mg     multivitamin     chlorthalidone 25 mg oral tablet [take 1 tablet (25 mg) by oral route once daily]    losartan 100 mg oral tablet [take 1 tablet (100 mg) by oral route once daily]    buPROPion  mg oral tablet [take 1 tablet (100 mg) by oral route 2 times per day]        Objective:        Vitals:         Current: 2/3/2020 10:06:44 AM    Ht:  5 ft, 0 in (Estimated);  Wt: 233.8 lbs;  BMI: 45.7T: 98.3 F (oral);  BP: 162/85 mm Hg (right arm, sitting);  P: 88 bpm (right arm (BP Cuff), sitting)        Repeat:     10:8:2 AM  BP:   154/90mm Hg (left arm, sitting, P-88)     Exams:     PHYSICAL EXAM:     GENERAL: vital signs recorded - well developed, well nourished, obese;  well groomed;  no apparent distress;     EYES: extraocular movements intact; conjunctiva and cornea are normal; PERRLA;     E/N/T: OROPHARYNX:  normal mucosa, dentition, gingiva, and posterior pharynx;     NECK: range of motion is normal; thyroid exam reveals not enlarged;     RESPIRATORY: normal respiratory rate and pattern with no distress; normal breath sounds with no rales, rhonchi, wheezes or rubs;     CARDIOVASCULAR: normal rate; rhythm is regular;  no systolic murmur; no edema;     GASTROINTESTINAL: nontender; normal bowel sounds;     LYMPHATIC: no enlargement of cervical or facial nodes;     MUSCULOSKELETAL: normal gait; normal overall tone     NEUROLOGIC: mental status: alert and oriented x 3; Reflexes: knee jerks: 2+;     PSYCHIATRIC:  appropriate affect and demeanor; normal speech pattern; grossly normal memory;         Assessment:         Z13.31   Encounter for screening for depression       I10   Essential (primary) hypertension       E03.9   Hypothyroidism, unspecified       M54.5   Low back pain       F33.1   Major depressive  disorder, recurrent, moderate       E55.9   Vitamin D deficiency, unspecified       A77.0   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       R73.03   Prediabetes           ORDERS:         Meds Prescribed:       [New Rx] metoprolol succinate 25 mg oral Tablet, Extended Release 24 hr [take 1 tablet (25 mg) by oral route once daily], #90 (ninety) tablets, Refills: 0 (zero)       [Refilled] chlorthalidone 25 mg oral tablet [take 1 tablet (25 mg) by oral route once daily], #90 (ninety) tablets, Refills: 3 (three)       [Refilled] losartan 100 mg oral tablet [take 1 tablet (100 mg) by oral route once daily], #90 (ninety) tablets, Refills: 3 (three)       [Refilled] buPROPion  mg oral tablet [take 1 tablet (100 mg) by oral route once a day ], #90 (ninety) tablets, Refills: 3 (three)       [Refilled] meloxicam 15 mg oral tablet [take 1 tablet (15 mg) by oral route once daily as needed ], #90 (ninety) tablets, Refills: 0 (zero)       [Refilled] levothyroxine 25 mcg oral tablet [take 1 tablet (25 mcg) by oral route once daily], #90 (ninety) tablets, Refills: 3 (three)         Lab Orders:       [id number]  PHYSF - [facility] PHYSICAL: CMP, CBC, TSH, LIPID: [id number], [id number], [id number]  (Send-Out)            59254  VITD - [facility] Vitamin D, 25 Hydroxy  (Send-Out)            24974  A1CEG - [facility] Hemoglobin A1C  (Send-Out)            APPTO  Appointment need  (In-House)              Other Orders:         Depression screen negative  (In-House)                      Plan:         Encounter for screening for depression    MIPS PHQ-9 Depression Screening: Completed form scanned and in chart; Total Score 4; Negative Depression Screen           Orders:         Depression screen negative  (In-House)              Essential (primary) hypertensionBP is elevated today so metoprolol 25 mg qd is added to losartan 100 mg qd and chlorthalidone 25 mg is changed from TID to qd.    LABORATORY:  Labs ordered to be performed today include PHYSICAL PANEL;  CMP, CBC, TSH, LIPID.      FOLLOW-UP: Schedule a follow-up visit in 1 month.:.            Prescriptions:       [New Rx] metoprolol succinate 25 mg oral Tablet, Extended Release 24 hr [take 1 tablet (25 mg) by oral route once daily], #90 (ninety) tablets, Refills: 0 (zero)       [Refilled] chlorthalidone 25 mg oral tablet [take 1 tablet (25 mg) by oral route once daily], #90 (ninety) tablets, Refills: 3 (three)       [Refilled] losartan 100 mg oral tablet [take 1 tablet (100 mg) by oral route once daily], #90 (ninety) tablets, Refills: 3 (three)           Orders:       [id number]  [facility] PHYSICAL: CMP, CBC, TSH, LIPID: [id number], [id number], [id number]  (Send-Out)            APPTO  Appointment need  (In-House)              Hypothyroidism, unspecifiedCont levothyroxine 25 mcg/day, will check TSH with basic labs.           Prescriptions:       [Refilled] levothyroxine 25 mcg oral tablet [take 1 tablet (25 mcg) by oral route once daily], #90 (ninety) tablets, Refills: 3 (three)         Low back painCont meloxicam 15 mg qd for chronic back pain, will request records.           Prescriptions:       [Refilled] meloxicam 15 mg oral tablet [take 1 tablet (15 mg) by oral route once daily as needed ], #90 (ninety) tablets, Refills: 0 (zero)         Major depressive disorder, recurrent, moderateBuproprion is changed from BID to qd, pt will let me know if depression is worse on lower dose.           Prescriptions:       [Refilled] buPROPion  mg oral tablet [take 1 tablet (100 mg) by oral route once a day ], #90 (ninety) tablets, Refills: 3 (three)         Vitamin D deficiency, unspecifiedWill recheck Vitamin D today, cont OTC supplements unless Rx is indicated.     LABORATORY:  Labs ordered to be performed today include Vitamin D.            Orders:       [id number]  VITD - [facility] Vitamin D, 25 Hydroxy  (Send-Out)              PrediabetesWill recheck A1c today, pt indicates she will swear off sugar and records show she has been able to  control sugar with diet when she is strict.     LABORATORY:  Labs ordered to be performed today include HgbA1C.            Orders:       [id number]  A1CEG - [facility] Hemoglobin A1C  (Send-Out)                  Patient Recommendations:        For  Essential (primary) hypertension:    Schedule a follow-up visit in 1 month.                APPOINTMENT INFORMATION:        Monday Tuesday Wednesday Thursday Friday Saturday Sunday            Time:___________________AM  PM   Date:_____________________             Charge Capture:         Primary Diagnosis:     Z13.31  Encounter for screening for depression           Orders:      [id number]  Office visit - new pt, level 3  (In-House)              Depression screen negative  (In-House)              I10  Essential (primary) hypertension           Orders:      APPTO  Appointment need  (In-House)              E03.9  Hypothyroidism, unspecified     M54.5  Low back pain     F33.1  Major depressive disorder, recurrent, moderate     E55.9  Vitamin D deficiency, unspecified     A77.0  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

## 2021-05-18 NOTE — PROGRESS NOTES
Kim Owen  1950     Office/Outpatient Visit    Visit Date: Mon, Mar 2, 2020 09:02 am    Provider: Leonardo Turner MD (Assistant: Maria Luz Overton, )    Location: [facility]        Electronically signed by Leonardo Turner MD on  03/02/2020 06:12:17 PM                             Subjective:        CC: Mrs. Owen is a 69 year old White female.  This is a follow-up visit.          HPI:       BP today is 138/65 with a HR of 64. She is on losartan 100 mg qd, chlorthalidone 25 mg qd and metoprolol 25 mg qd. She checks it rarely and its often 140s/70s. She denies headache or blurry vision.      A1c was 5.8 on 7/30/19 and again on 2/3/20. She cut out sugar from her diet completely and has drasticaly reduced high carb foods although she has not lost weight. She uses an ellipictal machine for exercise 4-5/week.      Pt has depression and is on buproprion 100 mg qd. We reduced this from BID to qd 1 month ago and she says there has been no change and her mood is overall good.      Pt has chronic low back pain, worse with walking. She has been to PT a little less than a year ago and in 2018 as well. Dry needling seemed to help a little. She has also been to pain management over a year ago and steroid injections did NOT help. She saw a surgeon, Yordan Julien in [facility] who recommended weight loss. She is on tramadol 50 mg qd and meloxicam 15 mg qd. Tramadol was prescribed by Cristina Lopez but she stopped prescribing this. She is on flexeril 5 mg qHS if pain is preventing her from sleeping. She was previously on gabapentin 100 mg BID and this helped her inflammation but she worries it made her forgetful. She stopped this about a month ago. MRI Lumbar spine 8/21/18 showed degenerative changes throughout the spine, most pronounced at L4/L5, with broad based bulge and resultant mild to moderate canal stenosis. Spondylolisthesis at L5/S1. Multilevel foraminal stenosis.      Pt has hypothyroidism  and is on levothyroxine 25 mcg/day. No specific Dx. She takes this at 3am when she gets up to pee.     ROS:     CONSTITUTIONAL:  Negative for fatigue and fever.      EYES:  Negative for blurred vision.      E/N/T:  Negative for diminished hearing and nasal congestion.      CARDIOVASCULAR:  Negative for chest pain and palpitations.      RESPIRATORY:  Negative for recent cough and dyspnea.      GASTROINTESTINAL:  Negative for abdominal pain, constipation, diarrhea, nausea and vomiting.      GENITOURINARY:  Negative for dysuria and urinary incontinence.      HEMATOLOGIC/LYMPHATIC:  Negative for easy bruising and excessive bleeding.      MUSCULOSKELETAL:  Positive for back pain.   Negative for arthralgias or myalgias.      NEUROLOGICAL:  Negative for paresthesias and weakness.      PSYCHIATRIC:  Positive for depression.   Negative for anxiety or sleep disturbance.          Past Medical History / Family History / Social History:         Last Reviewed on 3/02/2020 09:49 AM by Leonardo Turner    Past Medical History:             PAST MEDICAL HISTORY         Hypertension    Hypothyroidism     Chronic Pain: Back pain;     [place] Spotted Fever     Depression         PREVENTIVE HEALTH MAINTENANCE             BONE DENSITY: was last done 19 with the following abnormality noted-- Osteopenia     COLORECTAL CANCER SCREENING: Up to date (colonoscopy q10y; sigmoidoscopy q5y; Cologuard q3y) was last done ; colonoscopy with normal results     MAMMOGRAM: was last done 2018 with normal results     PAP SMEAR: No longer indicated due to age and history         Surgical History:         Hysterectomy: at age 48; endometriosis and menorrhagia;     Joint Replacement: right knee; 2016;         Family History:     Father:  at age 66; Cause of death was DM 2 and alcoholism     Mother:  at age 86; Cause of death was old age         Social History:     Occupation: Retired (Prior occupation: [employer] - night )      Marital Status:      Children: None     Hobbies/Recreation: she enjoys reading and kierra;     Exercise: Primary form of exercise is eliptical.   limited by back pain         Tobacco/Alcohol/Supplements:     Last Reviewed on 3/02/2020 09:49 AM by Leonardo Turner    Tobacco: She has never smoked.  Non-drinker     Caffeine:  She admits to consuming caffeine via coffee ( 2 servings per day ).          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        Current Medications:     Last Reviewed on 3/02/2020 09:49 AM by Leonardo Turner    Clindamycin  [300mg PRN]    potassium chloride 10 mEq oral tablet, extended release [take 1 tablet (10 meq) by oral route twice a day ]    Triamcinolone Acetate     cyclobenzaprine 5 mg oral tablet [take 1 tablet (5 mg) by oral route prn ]    traMADol 50 mg oral tablet [take 1 tablet (50 mg) by oral route every 4 hours as needed]    gabapentin 100 mg oral capsule [take 1 capsule (100 mg) by oral route prn ]    cholecalciferol (vitamin D3) 125 mcg (5,000 unit) oral tablet    Vitamin C  1000 mg     multivitamin     metoprolol succinate 25 mg oral Tablet, Extended Release 24 hr [take 1 tablet (25  mg) by oral route once daily]    chlorthalidone 25 mg oral tablet [take 1 tablet (25 mg) by oral route once daily]    losartan 100 mg oral tablet [take 1 tablet (100 mg) by oral route once daily]    buPROPion  mg oral tablet [take 1 tablet (100 mg) by oral route once a day ]    meloxicam 15 mg oral tablet [take 1 tablet (15 mg) by oral route once daily as needed ]    levothyroxine 25 mcg oral tablet [take 1 tablet (25 mcg) by oral route once daily]        Objective:        Vitals:         Current: 3/2/2020 9:07:55 AM    Ht:  5 ft, 0 in;  Wt: 235.2 lbs;  BMI: 45.9T: 98.1 F (oral);  BP: 138/65 mm Hg (left arm, sitting);  P: 64 bpm (right arm (BP Cuff), sitting);  sCr: 0.95 mg/dL;  GFR: 65.27        Exams:     PHYSICAL EXAM:     GENERAL: vital signs recorded - well developed, well nourished, obese;  well groomed;  no apparent distress;     EYES: extraocular movements intact; conjunctiva and cornea are normal; PERRLA;     E/N/T: OROPHARYNX:  normal mucosa, dentition, gingiva, and posterior pharynx;     NECK: range of motion is normal; trachea is midline; thyroid exam reveals not enlarged;     RESPIRATORY: normal respiratory rate and pattern with no distress; normal breath sounds with no rales, rhonchi, wheezes or rubs;     CARDIOVASCULAR: normal rate; rhythm is regular;  no systolic murmur; no edema;     GASTROINTESTINAL: nontender; normal bowel sounds;     MUSCULOSKELETAL: normal gait; normal overall tone     NEUROLOGIC: mental status: alert and oriented x 3; GROSSLY INTACT     PSYCHIATRIC:  appropriate affect and demeanor; normal speech pattern; grossly normal memory;         Assessment:         I10   Essential (primary) hypertension       R73.03   Prediabetes       F33.1   Major depressive disorder, recurrent, moderate       M54.5   Low back pain       E03.9   Hypothyroidism, unspecified           ORDERS:         Lab Orders:       APPTO  Appointment need  (In-House)            FUTURE  Future order to be done  at patients convenience  (Send-Out)            [id number]  [facility] CBC with 3 part diff  (Send-Out)            [id number]  [facility] [place] [address] CMP A1C LIPID AND MICRO ALBUM CR RATIO: [id number]  (Send-Out)            48894  [facility] TSH  (Send-Out)                      Plan:         Essential (primary) hypertensionBP is much improved with addition on metoprolol 25 mg qd. Cont losartan 100 mg qd, chlorthalidone 25 mg qd and metoprolol 25 mg.        PrediabetesPt has done very well improving her diet. We will recheck A1c in 3 months and she will f/u at that time.         FOLLOW-UP: Schedule a follow-up visit in 3 months.:.      FOLLOW-UP TESTING #1: FOLLOW-UP LABORATORY:  Labs to be scheduled in the future include CBC, Diabetes Panel 2;CMP, A1C, Lipid, Microalbumin:Creatinine Ratio, and TSH.   Patient to schedule in 3 months.            Orders:       APPTO  Appointment need  (In-House)            FUTURE  Future order to be done at patients convenience  (Send-Out)            [id number]  [facility] CBC with 3 part diff  (Send-Out)            28634  [facility] [place] [address] CMP A1C LIPID AND MICRO ALBUM CR RATIO: [id number]  (Send-Out)            [id number]  [facility] TSH  (Send-Out)              Major depressive disorder, recurrent, moderateDepression is well controlled on buproprion 100 mg qd.         Low back painLow back pain is moderately well controlled, She does not need any pain meds. Consider referring back to PT as she did well with dry needling.         Hypothyroidism, unspecifiedPt advised she is on a low dose and TSH is normal, cont levothyroxine 25 mcg/day.             Patient Recommendations:        For  Prediabetes:    Schedule a follow-up visit in 3 months.                APPOINTMENT INFORMATION:        Monday Tuesday Wednesday Thursday Friday Saturday Sunday            Time:___________________AM  PM   Date:_____________________         The following laboratory testing has been ordered: CBC TSH  Schedule the above testing in 3 months.              Charge Capture:         Primary Diagnosis:     I10  Essential (primary) hypertension           Orders:      72250  Office/outpatient visit; established patient, level 4  (In-House)              R73.03  Prediabetes           Orders:      APPTO  Appointment need  (In-House)              F33.1  Major depressive disorder, recurrent, moderate     M54.5  Low back pain     E03.9  Hypothyroidism, unspecified

## 2021-05-18 NOTE — PROGRESS NOTES
Kim Owen  1950     Office/Outpatient Visit    Visit Date: Tue, Mar 16, 2021 02:10 pm    Provider: Judie Palomo N.P. (Assistant: Ammy Carrington MA)    Location: [facility]        Electronically signed by Judie Palomo N.P. on  03/16/2021 03:21:19 PM                             Subjective:        CC: Mrs. Owen is a 70 year old White female.  This is a follow-up visit.  Look at shingles,refill gabapentin, back pain;         HPI:       Here for follow up on chronic pain, low back and both knees, taking Ultram prn and Gabapentin for pain but been out of med for few days , has been to PT , Dr Florence office , also [facility] pain [facility] got injections for her back that did not help , MRI done about 2 years ago     ROS:     CONSTITUTIONAL:  Negative for fatigue and fever.      RESPIRATORY:  Negative for recent cough, dyspnea and frequent wheezing.      GASTROINTESTINAL:  Negative for abdominal pain, constipation, diarrhea, nausea and vomiting.      GENITOURINARY:  Negative for dysuria and hematuria.      MUSCULOSKELETAL:  Positive for back pain ( chronic ).      INTEGUMENTARY/BREAST:  Positive for rash shingles.   Negative for atypical mole(s).      NEUROLOGICAL:  Negative for dizziness, memory loss, paresthesias, tremor and weakness.      PSYCHIATRIC:  Negative for anxiety, depression, and sleep disturbances.          Past Medical History / Family History / Social History:         Last Reviewed on 3/16/2021 02:33 PM by Judie Palomo    Past Medical History:             PAST MEDICAL HISTORY         Hypertension    Hypothyroidism     Chronic Pain: Back pain;     [place] Spotted Fever     Depression         CURRENT MEDICAL PROVIDERS:    Ophthalmologist: [place]         PREVENTIVE HEALTH MAINTENANCE             BONE DENSITY: was last done 7/30/19 with the following abnormality noted-- Osteopenia     COLORECTAL CANCER SCREENING: Up to date (colonoscopy q10y;  sigmoidoscopy q5y; Cologuard q3y) was last done ; colonoscopy with normal results     MAMMOGRAM: Done within last 2 years and results in are chart was last done 20 with normal results     PAP SMEAR: No longer indicated due to age and history         Surgical History:         Hysterectomy: at age 48; endometriosis and menorrhagia;     Joint Replacement: right knee; 2016;         Family History:     Father:  at age 66; Cause of death was DM 2 and alcoholism     Mother:  at age 86; Cause of death was old age         Social History:     Occupation: Retired (Prior occupation: [employer] - night )     Marital Status:      Children: None     Hobbies/Recreation: she enjoys reading and kierra;     Exercise: Primary form of exercise is eliptical.   limited by back pain         Tobacco/Alcohol/Supplements:     Last Reviewed on 3/16/2021 02:33 PM by Judie Palomo    Tobacco: She has never smoked.  Non-drinker     Caffeine:  She admits to consuming caffeine via coffee ( 2 servings per day ).          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    Hypothyroidism, unspecified    Vitamin D deficiency, unspecified    Major depressive disorder, recurrent, moderate    Essential (primary) hypertension    Low back pain    Prediabetes    Other long term (current) drug therapy    Other specified disorders of bone density and structure, unspecified site    Primary generalized (osteo)arthritis    Hyperlipidemia, unspecified    Zoster without complications        Immunizations:     Influenza, high dose seasonal 10/1/2019    influenza, high-dose, quadrivalent (FLUZONE HIGH-DOSE QUAD -) 2020    pneumococcal polysaccharide PPV23 (PNEUMOVAX 23) 2020        Allergies:     Last Reviewed on  3/16/2021 02:33 PM by Judie Palomo    No Known Allergies.        Current Medications:     Last Reviewed on 3/16/2021 02:33 PM by Judie Palomo    Clindamycin  [300mg PRN]    Triamcinolone Acetate     cholecalciferol (vitamin D3) 125 mcg (5,000 unit) oral tablet    multivitamin     metoprolol succinate 25 mg oral Tablet, Extended Release 24 hr [Take 1 tablet by mouth once daily]    losartan 100 mg oral tablet [take 1 tablet (100 mg) by oral route once daily]    chlorthalidone 25 mg oral tablet [take 1 tablet (25 mg) by oral route once daily]    buPROPion  mg oral tablet [take 1 tablet (100 mg) by oral route once a day ]    meloxicam 15 mg oral tablet [take 1 tablet (15 mg) by oral route once daily as needed ]    levothyroxine 50 mcg oral tablet [take 1 tablet (50 mcg) by oral route once daily]    metFORMIN 500 mg oral tablet [Take 1 tablet by mouth twice daily]    Shingrix (PF) 50 mcg/0.5 mL intramuscular Suspension for Reconstitution [inject 0.5 milliliter (50 mcg) by intramuscular route once]    calcium carbonate-vitamin D3 500 mg(1,250mg) -200 unit oral tablet [take 1 tablet bid , substitute per insurance preference ]    gabapentin 100 mg oral capsule [take 1 capsule (100 mg) by oral route 2 times daily prn pain ]    traMADol 50 mg oral tablet [take 1 tablet (50 mg) by oral route every bid prn pain ]    atorvastatin 10 mg oral tablet [1 tab daily at bedtime]        Objective:        Vitals:         Current: 3/16/2021 2:19:16 PM    Ht:  5 ft, 0 in;  Wt: 231.6 lbs;  BMI: 45.2T: 96.8 F (temporal);  BP: 143/80 mm Hg (right arm, sitting);  P: 100 bpm (right arm (BP Cuff), sitting);  sCr: 1.13 mg/dL;  GFR: 53.78        Exams:     PHYSICAL EXAM:     GENERAL: vital signs recorded - well developed, well nourished;  well groomed;  no apparent distress;     RESPIRATORY: normal respiratory rate and pattern with no distress; normal breath sounds with no rales, rhonchi, wheezes or rubs;     CARDIOVASCULAR:  "normal rate; rhythm is regular;  no systolic murmur; no edema;     GASTROINTESTINAL: nontender, nondistended; no hepatosplenomegaly or masses; no bruits;     MUSCULOSKELETAL: Low back tenderness, LROM, tenderness along left clavicle area previous shingles , rash cleared;     NEUROLOGIC: GROSSLY INTACT     PSYCHIATRIC:  appropriate affect and demeanor; normal speech pattern; grossly normal memory;         Lab/Test Results:         Urine temperature: confirmed (03/16/2021),     All urine drug screen levels confirmed negative: yes (03/16/2021),     Date and time of last pill: tramadol-\"3 days ago\"  gabapentin-\"2 weeks ago\" /sq (03/16/2021),     Performed by: larry (03/16/2021),     Collection Time: 1423 (03/16/2021),             Assessment:         Z79.899   Other long term (current) drug therapy       M48.061   Spinal stenosis, lumbar region without neurogenic claudication           ORDERS:         Meds Prescribed:       [Refilled] gabapentin 100 mg oral capsule [take 1 capsule (100 mg) by oral route 2 times daily prn pain ], #180 (one hundred and eighty) capsules, Refills: 1 (one)       [Refilled] traMADol 50 mg oral tablet [take 1 tablet (50 mg) by oral route every bid prn pain ], #60 (sixty) tablets, Refills: 2 (two)         Lab Orders:       [id number]  Drug test prsmv read direct optical obs pr date  (In-House)              Procedures Ordered:       REFER  Referral to Specialist or Other Facility  (Send-Out)                      Plan:         Other long term (current) drug therapyKasper today     Controlled substance documentation: Sean reviewed; drug screen performed and appropriate; consent is reviewed and signed and on the chart.  She is aware of risk of addiction on this medication, understands that she will need to follow up for a review every 3 months and her medications will be adjusted or decreased as deemed appropriate at each visit.  No history of drug or alcohol abuse.  No concerns about diversion or abuse. " She denies side effects related to the medication.  She is aware that she may be called in for pill counts.  The dosing of this medication will be reviewed on a regular basis and reduced if possible..  Ongoing use of a controlled substance is necessary for this patient to have a normal quality of life     Drug screen Controlled Substance Contract has been ordered           Orders:       [id number]  Drug test prsmv read direct optical obs pr date  (In-House)              Spinal stenosis, lumbar region without neurogenic claudication        REFERRALS:  Referral initiated to a neurosurgeon ( [facility] , Lumbar spinal stenosis , MRI 2018 ).            Prescriptions:       [Refilled] gabapentin 100 mg oral capsule [take 1 capsule (100 mg) by oral route 2 times daily prn pain ], #180 (one hundred and eighty) capsules, Refills: 1 (one)       [Refilled] traMADol 50 mg oral tablet [take 1 tablet (50 mg) by oral route every bid prn pain ], #60 (sixty) tablets, Refills: 2 (two)           Orders:       REFER  Referral to Specialist or Other Facility  (Send-Out)                  Charge Capture:         Primary Diagnosis:     Z79.899  Other long term (current) drug therapy           Orders:      [id number]  Office/outpatient visit; established patient, level 3  (In-House)            24988  Drug test prsmv read direct optical obs pr date  (In-House)              M48.061  Spinal stenosis, lumbar region without neurogenic claudication

## 2021-05-18 NOTE — PROGRESS NOTES
Kim Owen  1950     Office/Outpatient Visit    Visit Date: Fri, Jun 5, 2020 08:23 am    Provider: Leonardo Turner MD (Assistant: Maria Luz Overton, )    Location: [facility]        Electronically signed by Leonardo Turner MD on  06/05/2020 08:56:14 AM                             Subjective:        CC: Mrs. Owen is a 69 year old White female.  This is a follow-up visit.  [phone]---PHONE CALL; only has home phone;         HPI: Pt has depression and is on buproprion 100 mg qd. We reduced this from BID to qd 1 month ago and she says there has been no change and her mood is overall good.          Pt has chronic low back pain, worse with walking. She has been to PT a little less than a year ago and in 2018 as well. Dry needling seemed to help a little. She has also been to pain management over a year ago and steroid injections did NOT help. She saw a surgeon, Yordan Julien in [facility] who recommended weight loss. She is on tramadol 50 mg qd and meloxicam 15 mg qd. Tramadol was prescribed by Cristina Lopez but she stopped prescribing this. She is on flexeril 5 mg qHS if pain is preventing her from sleeping. She was previously on gabapentin 100 mg BID and this helped her inflammation but she worries it made her forgetful. She stopped this about a month ago. MRI Lumbar spine 8/21/18 showed degenerative changes throughout the spine, most pronounced at L4/L5, with broad based bulge and resultant mild to moderate canal stenosis. Spondylolisthesis at L5/S1. Multilevel foraminal stenosis.          Pt has hypothyroidism and is on levothyroxine 25 mcg/day. No specific Dx. She takes this at 3am when she gets up to pee.      She does not check her BP at home. She is on losartan 100 mg qd, chlorthalidone 25 mg qd and metoprolol 25 mg qd. She checks it rarely and its often 140s/70s. She denies headache or blurry vision.      A1c has been 5.8 and 5.9 on 2/3/20 and 6/2/20. She has continued to cut  out sugar from her diet completely and has drastically reduced high carb foods although she has not lost weight. She uses an ellipictal machine for exercise 4-5/week.    ROS:     CONSTITUTIONAL:  Negative for fatigue and fever.      EYES:  Negative for blurred vision.      E/N/T:  Negative for diminished hearing and nasal congestion.      CARDIOVASCULAR:  Negative for chest pain and palpitations.      RESPIRATORY:  Negative for recent cough and dyspnea.      GASTROINTESTINAL:  Negative for abdominal pain, constipation, diarrhea, nausea and vomiting.      GENITOURINARY:  Negative for dysuria and urinary incontinence.      HEMATOLOGIC/LYMPHATIC:  Negative for easy bruising and excessive bleeding.      MUSCULOSKELETAL:  Positive for back pain.   Negative for arthralgias or myalgias.      NEUROLOGICAL:  Negative for paresthesias and weakness.      PSYCHIATRIC:  Positive for depression.   Negative for anxiety or sleep disturbance.          Past Medical History / Family History / Social History:         Last Reviewed on 3/02/2020 09:49 AM by Leonardo Turner    Past Medical History:             PAST MEDICAL HISTORY         Hypertension    Hypothyroidism     Chronic Pain: Back pain;     [place] Spotted Fever     Depression         PREVENTIVE HEALTH MAINTENANCE             BONE DENSITY: was last done 19 with the following abnormality noted-- Osteopenia     COLORECTAL CANCER SCREENING: Up to date (colonoscopy q10y; sigmoidoscopy q5y; Cologuard q3y) was last done ; colonoscopy with normal results     MAMMOGRAM: was last done 2018 with normal results     PAP SMEAR: No longer indicated due to age and history         Surgical History:         Hysterectomy: at age 48; endometriosis and menorrhagia;     Joint Replacement: right knee; 2016;         Family History:     Father:  at age 66; Cause of death was DM 2 and alcoholism     Mother:  at age 86; Cause of death was old age         Social History:      Occupation: Retired (Prior occupation: [employer] - night )     Marital Status:      Children: None     Hobbies/Recreation: she enjoys reading and kierra;     Exercise: Primary form of exercise is eliptical.   limited by back pain         Tobacco/Alcohol/Supplements:     Last Reviewed on 3/02/2020 09:49 AM by Leonardo Turner    Tobacco: She has never smoked.  Non-drinker     Caffeine:  She admits to consuming caffeine via coffee ( 2 servings per day ).          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        Current Medications:     Last Reviewed on 3/02/2020 09:49 AM by Leonardo Turner    Clindamycin  [300mg PRN]    potassium chloride 10 mEq oral tablet, extended release [take 1 tablet (10 meq) by oral route twice a day ]    Triamcinolone Acetate     cyclobenzaprine 5 mg oral tablet [take 1 tablet (5 mg) by oral route prn ]    traMADol 50 mg oral tablet [take 1 tablet (50 mg) by oral route every 4 hours as needed]    gabapentin 100 mg oral capsule [take 1 capsule (100 mg) by oral route prn ]    cholecalciferol (vitamin D3) 125 mcg (5,000 unit) oral tablet    Vitamin C  1000 mg     multivitamin     metoprolol  succinate 25 mg oral Tablet, Extended Release 24 hr [Take 1 tablet by mouth once daily]    chlorthalidone 25 mg oral tablet [take 1 tablet (25 mg) by oral route once daily]    losartan 100 mg oral tablet [take 1 tablet (100 mg) by oral route once daily]    buPROPion  mg oral tablet [take 1 tablet (100 mg) by oral route once a day ]    meloxicam 15 mg oral tablet [take 1 tablet (15 mg) by oral route once daily as needed ]    levothyroxine 25 mcg oral tablet [take 1 tablet (25 mcg) by oral route once daily]        Assessment:         I10   Essential (primary) hypertension       R73.03   Prediabetes       Z12.31   Encounter for screening mammogram for malignant neoplasm of breast           ORDERS:         Meds Prescribed:       [New Rx] metFORMIN 500 mg oral tablet [take 1 tablet (500 mg) by oral route 2 times per day ], #60 (sixty) tablets, Refills: 2 (two)       [Refilled] metoprolol succinate 25 mg oral Tablet, Extended Release 24 hr [Take 1 tablet by mouth once daily], #90 (ninety) each, Refills: 2 (two)         Radiology/Test Orders:       [id number]  Screening digital breast tomosynthesis bi  (Send-Out)              Lab Orders:       APPTO  Appointment need  (In-House)            FUTURE  Future order to be done at patients convenience  (Send-Out)            53997  BDCBC [facility] CBC with 3 part diff  (Send-Out)            89421  DIAB[address] [place] [address] LIPID,CMP, A1C: [id number], 30602, 77679  (Send-Out)            05613  TSH - [facility] TSH  (Send-Out)                      Plan:         Essential (primary) hypertensionMarleen does not check her BP at home. She is on losartan 100 mg qd, chlorthalidone 25 mg qd and metoprolol 25 mg qd. She checks it rarely and its often 140s/70s. She denies headache or blurry vision.    Telehealth: Verbal consent obtained for visit to occur via phone call; Staff, other than provider, present during telephone visit include Maria Luz Overton; Total time spent was 12 minutes; 10117--Pxtxecrsz E/M 11-20 minutes            Prescriptions:       [Refilled] metoprolol succinate 25 mg oral Tablet, Extended Release 24 hr [Take 1 tablet by mouth once daily], #90 (ninety) each, Refills: 2 (two)         PrediabetesSince A1c increased from 5.8 to 5.9 despite lifestyle changes she is started on metfromin 500 mg BID. She is doing her best to limit sugar, high carbohydrate foods, and exercise, but this is limited by pain.        FOLLOW-UP: Schedule a follow-up visit in 5 months.:.      FOLLOW-UP TESTING #1: FOLLOW-UP LABORATORY:  Labs to be scheduled in the future include CBC, Diabetes Panel 1; CMP, Lipid, A1C, and TSH.   Patient to schedule in 5 months.            Prescriptions:       [New Rx] metFORMIN 500 mg oral tablet [take 1 tablet (500 mg) by oral route 2 times per day ], #60 (sixty) tablets, Refills: 2 (two)           Orders:       APPTO  Appointment need  (In-House)            FUTURE  Future order to be done at patients convenience  (Send-Out)            69863  BDCBC - [facility] CBC with 3 part diff  (Send-Out)            68490  DIAB[address] [place] [address] LIPID,CMP, A1C: 45602, 48518, 26026  (Send-Out)            24644  TSH - [facility] TSH  (Send-Out)              Encounter for screening mammogram for malignant neoplasm of breast        FOLLOW-UP TESTING #1:    RADIOLOGY:  I have ordered Screening 3D Mammogram Bilateral to be done today. Patient to schedule in 1week.            Orders:       [id number]  Screening digital breast tomosynthesis bi  (Send-Out)                  Patient Recommendations:        For  Prediabetes:    Schedule a follow-up visit in 5 months.                APPOINTMENT INFORMATION:        Monday Tuesday Wednesday Thursday Friday Saturday Sunday            Time:___________________AM  PM   Date:_____________________         The following laboratory testing has been ordered: CBC TSH Schedule the above testing in 5 months.          For  Encounter for screening mammogram for malignant neoplasm of breast:    Schedule a follow-up visit in  1 week.              Charge Capture:         Primary Diagnosis:     I10  Essential (primary) hypertension           Orders:      [id number]  Phys/QHP telephone evaluation 11-20 minutes  (In-House)              R73.03  Prediabetes           Orders:      APPTO  Appointment need  (In-House)              Z12.31  Encounter for screening mammogram for malignant neoplasm of breast

## 2021-05-21 ENCOUNTER — HOSPITAL ENCOUNTER (OUTPATIENT)
Dept: OTHER | Facility: HOSPITAL | Age: 71
Discharge: HOME | End: 2021-05-21
Attending: ORTHOPAEDIC SURGERY

## 2021-05-22 LAB — SARS-COV-2 RNA SPEC QL NAA+PROBE: NOT DETECTED

## 2021-07-02 VITALS
SYSTOLIC BLOOD PRESSURE: 143 MMHG | HEART RATE: 100 BPM | DIASTOLIC BLOOD PRESSURE: 80 MMHG | BODY MASS INDEX: 45.47 KG/M2 | TEMPERATURE: 96.8 F | HEIGHT: 60 IN | WEIGHT: 231.6 LBS

## 2021-07-02 VITALS
DIASTOLIC BLOOD PRESSURE: 90 MMHG | HEART RATE: 88 BPM | SYSTOLIC BLOOD PRESSURE: 154 MMHG | TEMPERATURE: 98.3 F | BODY MASS INDEX: 45.9 KG/M2 | HEIGHT: 60 IN | WEIGHT: 233.8 LBS

## 2021-07-02 VITALS
BODY MASS INDEX: 45.07 KG/M2 | DIASTOLIC BLOOD PRESSURE: 79 MMHG | HEART RATE: 85 BPM | TEMPERATURE: 97.1 F | WEIGHT: 229.6 LBS | HEIGHT: 60 IN | SYSTOLIC BLOOD PRESSURE: 137 MMHG

## 2021-07-02 VITALS
DIASTOLIC BLOOD PRESSURE: 65 MMHG | TEMPERATURE: 98.1 F | HEART RATE: 64 BPM | SYSTOLIC BLOOD PRESSURE: 138 MMHG | BODY MASS INDEX: 46.17 KG/M2 | WEIGHT: 235.2 LBS | HEIGHT: 60 IN

## 2021-08-11 ENCOUNTER — TELEPHONE (OUTPATIENT)
Dept: FAMILY MEDICINE CLINIC | Age: 71
End: 2021-08-11

## 2021-08-11 RX ORDER — LOSARTAN POTASSIUM 100 MG/1
1 TABLET ORAL DAILY
COMMUNITY
End: 2021-08-11 | Stop reason: SDUPTHER

## 2021-08-11 RX ORDER — LOSARTAN POTASSIUM 100 MG/1
100 TABLET ORAL DAILY
Qty: 30 TABLET | Refills: 0 | Status: SHIPPED | OUTPATIENT
Start: 2021-08-11 | End: 2021-09-09 | Stop reason: SDUPTHER

## 2021-08-11 NOTE — TELEPHONE ENCOUNTER
Caller: Kim Owen    Relationship: Self    Best call back number: [phone]    Medication needed:   Requested Prescriptions      No prescriptions requested or ordered in this encounter   LOSARTAN POTASSIUM 100 MG    When do you need the refill by: ASAP    What additional details did the patient provide when requesting the medication: PATIENT STATED SHE HAS 4 DAYS OF MEDICATION REMAINING    Does the patient have less than a 3 day supply:  [] Yes  [x] No    What is the patient's preferred pharmacy: [address] [phone] [facility] [phone]

## 2021-08-16 NOTE — TELEPHONE ENCOUNTER
Rx Refill Note  Requested Prescriptions     Pending Prescriptions Disp Refills   • metFORMIN (GLUCOPHAGE) 500 MG tablet [Pharmacy Med Name: metFORMIN HCl 500 MG Oral Tablet] 60 tablet 0     Sig: Take 1 tablet by mouth twice daily      Last office visit with prescribing clinician: 3/16/21  Next office visit with prescribing clinician: Est care. 9/19/21    Please add Last Relevant Lab Date if appropriate: 5/4/21  Hemoglobin A1c  Order: [id number]  Status:  Final result   Visible to patient:  No (not released)   Next appt:  None        0 Result Notes  Component   Ref Range & Units 3 mo ago   Hemoglobin A1C   3.5 - 5.7 % 5.7    Comment: **Interpretation**   <7% in Controlled Diabetic Patients.   Assay Range 3.4-18.2%   ADA 2010 Standards of Medical Care in Diabetes suggest using   a cut point of >= 6.5% for diagnosis of diabetes and an A1C   range of 5.7%-6.4% as a category of increased risk for future   diabetes.          Specimen Collected: 05/04/21 07:46 Last Resulted: 05/04/21 10:54               Is Refill Pharmacy correct? Yes  Mariah Harrison  08/16/21, 12:41 EDT

## 2021-09-09 ENCOUNTER — OFFICE VISIT (OUTPATIENT)
Dept: FAMILY MEDICINE CLINIC | Age: 71
End: 2021-09-09

## 2021-09-09 VITALS
TEMPERATURE: 98.1 F | SYSTOLIC BLOOD PRESSURE: 109 MMHG | HEART RATE: 74 BPM | HEIGHT: 60 IN | BODY MASS INDEX: 44.61 KG/M2 | DIASTOLIC BLOOD PRESSURE: 57 MMHG | WEIGHT: 227.2 LBS

## 2021-09-09 DIAGNOSIS — R73.03 PREDIABETES: ICD-10-CM

## 2021-09-09 DIAGNOSIS — Z12.31 ENCOUNTER FOR SCREENING MAMMOGRAM FOR MALIGNANT NEOPLASM OF BREAST: ICD-10-CM

## 2021-09-09 DIAGNOSIS — E03.9 HYPOTHYROIDISM, UNSPECIFIED TYPE: ICD-10-CM

## 2021-09-09 DIAGNOSIS — E78.2 MIXED HYPERLIPIDEMIA: ICD-10-CM

## 2021-09-09 DIAGNOSIS — I10 ESSENTIAL (PRIMARY) HYPERTENSION: Primary | ICD-10-CM

## 2021-09-09 DIAGNOSIS — F33.1 MAJOR DEPRESSIVE DISORDER, RECURRENT, MODERATE (HCC): ICD-10-CM

## 2021-09-09 DIAGNOSIS — Z79.899 OTHER LONG TERM (CURRENT) DRUG THERAPY: ICD-10-CM

## 2021-09-09 LAB
AMPHET+METHAMPHET UR QL: NEGATIVE
AMPHETAMINES UR QL: NEGATIVE
BARBITURATES UR QL SCN: NEGATIVE
BENZODIAZ UR QL SCN: NEGATIVE
BUPRENORPHINE SERPL-MCNC: NEGATIVE NG/ML
CANNABINOIDS SERPL QL: NEGATIVE
COCAINE UR QL: NEGATIVE
EXPIRATION DATE: NORMAL
Lab: NORMAL
MDMA UR QL SCN: NEGATIVE
METHADONE UR QL SCN: NEGATIVE
OPIATES UR QL: NEGATIVE
OXYCODONE UR QL SCN: NEGATIVE
PCP UR QL SCN: NEGATIVE

## 2021-09-09 PROCEDURE — 80305 DRUG TEST PRSMV DIR OPT OBS: CPT | Performed by: NURSE PRACTITIONER

## 2021-09-09 PROCEDURE — 99214 OFFICE O/P EST MOD 30 MIN: CPT | Performed by: NURSE PRACTITIONER

## 2021-09-09 RX ORDER — BUPROPION HYDROCHLORIDE 100 MG/1
100 TABLET ORAL DAILY
Qty: 90 TABLET | Refills: 1 | Status: SHIPPED | OUTPATIENT
Start: 2021-09-09 | End: 2021-12-10 | Stop reason: SDUPTHER

## 2021-09-09 RX ORDER — LEVOTHYROXINE SODIUM 0.03 MG/1
25 TABLET ORAL DAILY
COMMUNITY
End: 2021-09-09 | Stop reason: SDUPTHER

## 2021-09-09 RX ORDER — METOPROLOL SUCCINATE 25 MG/1
25 TABLET, EXTENDED RELEASE ORAL DAILY
COMMUNITY
Start: 2021-07-26 | End: 2021-09-09 | Stop reason: SDUPTHER

## 2021-09-09 RX ORDER — TRAMADOL HYDROCHLORIDE 50 MG/1
50 TABLET ORAL 2 TIMES DAILY PRN
Qty: 60 TABLET | Refills: 0 | Status: SHIPPED | OUTPATIENT
Start: 2021-09-09 | End: 2021-12-10 | Stop reason: SDUPTHER

## 2021-09-09 RX ORDER — ATORVASTATIN CALCIUM 10 MG/1
10 TABLET, FILM COATED ORAL
COMMUNITY
Start: 2021-09-02 | End: 2021-09-09 | Stop reason: SDUPTHER

## 2021-09-09 RX ORDER — TRAMADOL HYDROCHLORIDE 50 MG/1
50 TABLET ORAL 2 TIMES DAILY PRN
COMMUNITY
Start: 2021-08-09 | End: 2021-09-09 | Stop reason: SDUPTHER

## 2021-09-09 RX ORDER — LEVOTHYROXINE SODIUM 0.03 MG/1
25 TABLET ORAL DAILY
Qty: 90 TABLET | Refills: 1 | Status: SHIPPED | OUTPATIENT
Start: 2021-09-09 | End: 2021-12-10 | Stop reason: SDUPTHER

## 2021-09-09 RX ORDER — GABAPENTIN 100 MG/1
100 CAPSULE ORAL 2 TIMES DAILY PRN
COMMUNITY
Start: 2021-07-26 | End: 2021-09-09 | Stop reason: SDUPTHER

## 2021-09-09 RX ORDER — CHLORTHALIDONE 25 MG/1
25 TABLET ORAL DAILY
Qty: 90 TABLET | Refills: 1 | Status: SHIPPED | OUTPATIENT
Start: 2021-09-09 | End: 2021-11-04

## 2021-09-09 RX ORDER — CHLORTHALIDONE 25 MG/1
25 TABLET ORAL DAILY
COMMUNITY
Start: 2021-07-09 | End: 2021-09-09 | Stop reason: SDUPTHER

## 2021-09-09 RX ORDER — CYCLOBENZAPRINE HCL 10 MG
10 TABLET ORAL EVERY 8 HOURS
COMMUNITY
Start: 2021-06-28 | End: 2021-09-09 | Stop reason: SDUPTHER

## 2021-09-09 RX ORDER — ATORVASTATIN CALCIUM 10 MG/1
10 TABLET, FILM COATED ORAL
Qty: 90 TABLET | Refills: 1 | Status: SHIPPED | OUTPATIENT
Start: 2021-09-09 | End: 2021-12-10 | Stop reason: SDUPTHER

## 2021-09-09 RX ORDER — LOSARTAN POTASSIUM 100 MG/1
100 TABLET ORAL DAILY
Qty: 90 TABLET | Refills: 1 | Status: SHIPPED | OUTPATIENT
Start: 2021-09-09 | End: 2021-12-10 | Stop reason: SDUPTHER

## 2021-09-09 RX ORDER — CYCLOBENZAPRINE HCL 10 MG
10 TABLET ORAL EVERY 8 HOURS
Qty: 90 TABLET | Refills: 1 | Status: SHIPPED | OUTPATIENT
Start: 2021-09-09 | End: 2022-03-10

## 2021-09-09 RX ORDER — METOPROLOL SUCCINATE 25 MG/1
25 TABLET, EXTENDED RELEASE ORAL DAILY
Qty: 90 TABLET | Refills: 1 | Status: SHIPPED | OUTPATIENT
Start: 2021-09-09 | End: 2021-11-04

## 2021-09-09 RX ORDER — GABAPENTIN 100 MG/1
100 CAPSULE ORAL 2 TIMES DAILY PRN
Qty: 180 CAPSULE | Refills: 1 | Status: SHIPPED | OUTPATIENT
Start: 2021-09-09 | End: 2021-12-10 | Stop reason: SDUPTHER

## 2021-09-09 RX ORDER — BUPROPION HYDROCHLORIDE 100 MG/1
100 TABLET ORAL DAILY
COMMUNITY
Start: 2021-07-09 | End: 2021-09-09 | Stop reason: SDUPTHER

## 2021-09-18 DIAGNOSIS — R73.03 PREDIABETES: ICD-10-CM

## 2021-10-18 NOTE — ASSESSMENT & PLAN NOTE
Controlled substance documentation: Sean reviewed; drug screen performed and appropriate; consent is reviewed and signed and on the chart.  Is aware of risk of addiction on this medication, understands will need to follow up for a review at least every 3 months and medications will be adjusted or decreased as deemed appropriate at each visit.  No history of drug or alcohol abuse.  No concerns about diversion or abuse. Denies side effects related to the medication.  Aware may be called in for pill counts.  The dosing of this medication will be reviewed on a regular basis and reduced if possible.

## 2021-11-04 DIAGNOSIS — I10 ESSENTIAL (PRIMARY) HYPERTENSION: ICD-10-CM

## 2021-11-04 RX ORDER — CHLORTHALIDONE 25 MG/1
TABLET ORAL
Qty: 90 TABLET | Refills: 0 | Status: SHIPPED | OUTPATIENT
Start: 2021-11-04 | End: 2021-12-10 | Stop reason: SDUPTHER

## 2021-11-04 RX ORDER — METOPROLOL SUCCINATE 25 MG/1
TABLET, EXTENDED RELEASE ORAL
Qty: 90 TABLET | Refills: 0 | Status: SHIPPED | OUTPATIENT
Start: 2021-11-04 | End: 2021-12-10 | Stop reason: SDUPTHER

## 2021-11-09 DIAGNOSIS — I10 ESSENTIAL (PRIMARY) HYPERTENSION: ICD-10-CM

## 2021-11-09 RX ORDER — METOPROLOL SUCCINATE 25 MG/1
TABLET, EXTENDED RELEASE ORAL
Qty: 90 TABLET | Refills: 0 | OUTPATIENT
Start: 2021-11-09

## 2021-12-07 ENCOUNTER — HOSPITAL ENCOUNTER (OUTPATIENT)
Dept: MAMMOGRAPHY | Facility: HOSPITAL | Age: 71
Discharge: HOME | End: 2021-12-07
Admitting: NURSE PRACTITIONER

## 2021-12-07 DIAGNOSIS — Z12.31 ENCOUNTER FOR SCREENING MAMMOGRAM FOR MALIGNANT NEOPLASM OF BREAST: ICD-10-CM

## 2021-12-07 PROCEDURE — 77063 BREAST TOMOSYNTHESIS BI: CPT

## 2021-12-07 PROCEDURE — 77067 SCR MAMMO BI INCL CAD: CPT

## 2021-12-10 ENCOUNTER — LAB (OUTPATIENT)
Dept: LAB | Facility: HOSPITAL | Age: 71
End: 2021-12-10

## 2021-12-10 ENCOUNTER — OFFICE VISIT (OUTPATIENT)
Dept: FAMILY MEDICINE CLINIC | Age: 71
End: 2021-12-10

## 2021-12-10 VITALS
HEART RATE: 71 BPM | BODY MASS INDEX: 45.16 KG/M2 | WEIGHT: 230 LBS | HEIGHT: 60 IN | OXYGEN SATURATION: 96 % | TEMPERATURE: 97.7 F | SYSTOLIC BLOOD PRESSURE: 122 MMHG | DIASTOLIC BLOOD PRESSURE: 55 MMHG

## 2021-12-10 DIAGNOSIS — R73.03 PREDIABETES: ICD-10-CM

## 2021-12-10 DIAGNOSIS — Z11.59 SCREENING FOR VIRAL DISEASE: ICD-10-CM

## 2021-12-10 DIAGNOSIS — E03.9 HYPOTHYROIDISM, UNSPECIFIED TYPE: ICD-10-CM

## 2021-12-10 DIAGNOSIS — E78.2 MIXED HYPERLIPIDEMIA: ICD-10-CM

## 2021-12-10 DIAGNOSIS — Z78.0 POST-MENOPAUSAL: ICD-10-CM

## 2021-12-10 DIAGNOSIS — Z79.899 OTHER LONG TERM (CURRENT) DRUG THERAPY: ICD-10-CM

## 2021-12-10 DIAGNOSIS — Z12.11 SCREENING FOR COLORECTAL CANCER: ICD-10-CM

## 2021-12-10 DIAGNOSIS — F33.1 MAJOR DEPRESSIVE DISORDER, RECURRENT, MODERATE (HCC): ICD-10-CM

## 2021-12-10 DIAGNOSIS — M15.0 PRIMARY GENERALIZED (OSTEO)ARTHRITIS: ICD-10-CM

## 2021-12-10 DIAGNOSIS — I10 ESSENTIAL (PRIMARY) HYPERTENSION: ICD-10-CM

## 2021-12-10 DIAGNOSIS — Z00.00 MEDICARE ANNUAL WELLNESS VISIT, SUBSEQUENT: Primary | ICD-10-CM

## 2021-12-10 DIAGNOSIS — Z12.12 SCREENING FOR COLORECTAL CANCER: ICD-10-CM

## 2021-12-10 PROBLEM — M51.36 DEGENERATION OF LUMBAR INTERVERTEBRAL DISC: Status: ACTIVE | Noted: 2018-06-05

## 2021-12-10 PROBLEM — M51.369 DEGENERATION OF LUMBAR INTERVERTEBRAL DISC: Status: ACTIVE | Noted: 2018-06-05

## 2021-12-10 PROBLEM — F41.9 ANXIETY: Status: ACTIVE | Noted: 2021-12-10

## 2021-12-10 PROBLEM — N28.9 RENAL INSUFFICIENCY: Status: ACTIVE | Noted: 2021-12-10

## 2021-12-10 PROBLEM — M47.819 SPONDYLOSIS WITHOUT MYELOPATHY: Status: ACTIVE | Noted: 2018-03-30

## 2021-12-10 LAB
ALBUMIN SERPL-MCNC: 4.1 G/DL (ref 3.5–5.2)
ALBUMIN/GLOB SERPL: 1.3 G/DL
ALP SERPL-CCNC: 69 U/L (ref 39–117)
ALT SERPL W P-5'-P-CCNC: 19 U/L (ref 1–33)
ANION GAP SERPL CALCULATED.3IONS-SCNC: 9.2 MMOL/L (ref 5–15)
AST SERPL-CCNC: 19 U/L (ref 1–32)
BILIRUB SERPL-MCNC: 0.3 MG/DL (ref 0–1.2)
BUN SERPL-MCNC: 22 MG/DL (ref 8–23)
BUN/CREAT SERPL: 21.2 (ref 7–25)
CALCIUM SPEC-SCNC: 10 MG/DL (ref 8.6–10.5)
CHLORIDE SERPL-SCNC: 99 MMOL/L (ref 98–107)
CO2 SERPL-SCNC: 27.8 MMOL/L (ref 22–29)
CREAT SERPL-MCNC: 1.04 MG/DL (ref 0.57–1)
GFR SERPL CREATININE-BSD FRML MDRD: 52 ML/MIN/1.73
GLOBULIN UR ELPH-MCNC: 3.2 GM/DL
GLUCOSE SERPL-MCNC: 109 MG/DL (ref 65–99)
HBA1C MFR BLD: 6.3 % (ref 4.8–5.6)
HCV AB SER DONR QL: NORMAL
POTASSIUM SERPL-SCNC: 3.9 MMOL/L (ref 3.5–5.2)
PROT SERPL-MCNC: 7.3 G/DL (ref 6–8.5)
SODIUM SERPL-SCNC: 136 MMOL/L (ref 136–145)
TSH SERPL DL<=0.05 MIU/L-ACNC: 1.71 UIU/ML (ref 0.27–4.2)

## 2021-12-10 PROCEDURE — 80053 COMPREHEN METABOLIC PANEL: CPT

## 2021-12-10 PROCEDURE — 36415 COLL VENOUS BLD VENIPUNCTURE: CPT

## 2021-12-10 PROCEDURE — 86803 HEPATITIS C AB TEST: CPT

## 2021-12-10 PROCEDURE — 84443 ASSAY THYROID STIM HORMONE: CPT

## 2021-12-10 PROCEDURE — G0439 PPPS, SUBSEQ VISIT: HCPCS | Performed by: NURSE PRACTITIONER

## 2021-12-10 PROCEDURE — 1159F MED LIST DOCD IN RCRD: CPT | Performed by: NURSE PRACTITIONER

## 2021-12-10 PROCEDURE — 1170F FXNL STATUS ASSESSED: CPT | Performed by: NURSE PRACTITIONER

## 2021-12-10 PROCEDURE — 83036 HEMOGLOBIN GLYCOSYLATED A1C: CPT

## 2021-12-10 RX ORDER — IBUPROFEN 800 MG/1
1 TABLET ORAL AS NEEDED
COMMUNITY
End: 2021-12-10 | Stop reason: SDUPTHER

## 2021-12-10 RX ORDER — LEVOTHYROXINE SODIUM 0.03 MG/1
25 TABLET ORAL DAILY
Qty: 90 TABLET | Refills: 1 | Status: SHIPPED | OUTPATIENT
Start: 2021-12-10 | End: 2022-03-10 | Stop reason: SDUPTHER

## 2021-12-10 RX ORDER — MELOXICAM 15 MG/1
1 TABLET ORAL DAILY
COMMUNITY
Start: 2021-11-04 | End: 2021-12-10

## 2021-12-10 RX ORDER — TRAMADOL HYDROCHLORIDE 50 MG/1
50 TABLET ORAL 2 TIMES DAILY PRN
Qty: 60 TABLET | Refills: 0 | Status: SHIPPED | OUTPATIENT
Start: 2021-12-10 | End: 2022-03-10 | Stop reason: SDUPTHER

## 2021-12-10 RX ORDER — GABAPENTIN 100 MG/1
100 CAPSULE ORAL 2 TIMES DAILY PRN
Qty: 180 CAPSULE | Refills: 1 | Status: SHIPPED | OUTPATIENT
Start: 2021-12-10 | End: 2022-06-15

## 2021-12-10 RX ORDER — CHLORTHALIDONE 25 MG/1
25 TABLET ORAL DAILY
Qty: 90 TABLET | Refills: 1 | Status: SHIPPED | OUTPATIENT
Start: 2021-12-10 | End: 2022-03-10 | Stop reason: SDUPTHER

## 2021-12-10 RX ORDER — IBUPROFEN 800 MG/1
800 TABLET ORAL AS NEEDED
Qty: 90 TABLET | Refills: 1 | Status: SHIPPED | OUTPATIENT
Start: 2021-12-10 | End: 2022-03-10 | Stop reason: SDUPTHER

## 2021-12-10 RX ORDER — METOPROLOL SUCCINATE 25 MG/1
25 TABLET, EXTENDED RELEASE ORAL DAILY
Qty: 90 TABLET | Refills: 1 | Status: SHIPPED | OUTPATIENT
Start: 2021-12-10 | End: 2022-02-08 | Stop reason: SDUPTHER

## 2021-12-10 RX ORDER — ATORVASTATIN CALCIUM 10 MG/1
10 TABLET, FILM COATED ORAL
Qty: 90 TABLET | Refills: 1 | Status: SHIPPED | OUTPATIENT
Start: 2021-12-10 | End: 2022-03-10 | Stop reason: SDUPTHER

## 2021-12-10 RX ORDER — LOSARTAN POTASSIUM 100 MG/1
100 TABLET ORAL DAILY
Qty: 90 TABLET | Refills: 1 | Status: SHIPPED | OUTPATIENT
Start: 2021-12-10 | End: 2022-03-10 | Stop reason: SDUPTHER

## 2021-12-10 RX ORDER — BUPROPION HYDROCHLORIDE 100 MG/1
100 TABLET ORAL DAILY
Qty: 90 TABLET | Refills: 1 | Status: SHIPPED | OUTPATIENT
Start: 2021-12-10 | End: 2022-03-10 | Stop reason: SDUPTHER

## 2021-12-10 NOTE — PROGRESS NOTES
The ABCs of the Annual Wellness Visit  Subsequent Medicare Wellness Visit    Chief Complaint   Patient presents with   • Medicare Wellness-subsequent   • control substances     3MO      Subjective    History of Present Illness:  Kim Owen is a 71 y.o. female who presents for a Subsequent Medicare Wellness Visit.    History of HTN. Taking losartan 100 mg daily , chlorthalidone 25 mg daily, Toprol XL 25 mg daily.   On atorvastatin for hyperlipidemia.   Hx prediabetes. On metformin. Watching diet. Normal A1C at last check.   History of hypothyroidism. Taking levothyroxine.   Hx osteopenia on bone density scan. Was previously taking calcium but was taken off as prior provider told her she had calcium in her urine but gets in diet. Vitamin D3 39580 daily. Also takes MVI.   Taking Wellbutrin for depression.   She has had lumbar fusion L4-L5. 3 months ago. Dr Akhtar did her surgery. She is on gabapentin for nerve pain. And Tramadol as well. She takes sparingly. She is also on flexeril.   History of abnormal kidney function labs. Creatinine 1.15 typically.     The following portions of the patient's history were reviewed and   updated as appropriate: allergies, current medications, past family history, past medical history, past social history, past surgical history and problem list.    Compared to one year ago, the patient feels her physical   health is better.    Compared to one year ago, the patient feels her mental   health is better.    Recent Hospitalizations:  She was admitted within the past 365 days at [facility].       Current Medical Providers:  Patient Care Team:  Lindy Chavarria APRN as PCP - General (Nurse Practitioner)    Outpatient Medications Prior to Visit   Medication Sig Dispense Refill   • cyclobenzaprine (FLEXERIL) 10 MG tablet Take 1 tablet by mouth Every 8 (Eight) Hours. 90 tablet 1   • atorvastatin (LIPITOR) 10 MG tablet Take 1 tablet by mouth every night at bedtime. 90 tablet 1   •  buPROPion (WELLBUTRIN) 100 MG tablet Take 1 tablet by mouth Daily. 90 tablet 1   • chlorthalidone (HYGROTON) 25 MG tablet Take 1 tablet by mouth once daily 90 tablet 0   • gabapentin (NEURONTIN) 100 MG capsule Take 1 capsule by mouth 2 (Two) Times a Day As Needed (nerve pain). 180 capsule 1   • ibuprofen (ADVIL,MOTRIN) 800 MG tablet Take 1 tablet by mouth As Needed.     • levothyroxine (SYNTHROID, LEVOTHROID) 25 MCG tablet Take 1 tablet by mouth Daily. 90 tablet 1   • losartan (COZAAR) 100 MG tablet Take 1 tablet by mouth Daily. PT MUST HAVE APPT FOR FURTHER REFILLS 90 tablet 1   • metFORMIN (GLUCOPHAGE) 500 MG tablet Take 1 tablet by mouth 2 (Two) Times a Day. 180 tablet 1   • metoprolol succinate XL (TOPROL-XL) 25 MG 24 hr tablet Take 1 tablet by mouth once daily 90 tablet 0   • traMADol (ULTRAM) 50 MG tablet Take 1 tablet by mouth 2 (Two) Times a Day As Needed for Severe Pain . for pain 60 tablet 0   • meloxicam (MOBIC) 15 MG tablet Take 1 tablet by mouth Daily.       No facility-administered medications prior to visit.       Opioid medication/s are on active medication list.  and I have evaluated her active treatment plan and pain score trends (see table).  There were no vitals filed for this visit.  I have reviewed the chart for potential of high risk medication and harmful drug interactions in the elderly.            Aspirin is not on active medication list.  Aspirin use is not indicated based on review of current medical condition/s. Risk of harm outweighs potential benefits.  .    Patient Active Problem List   Diagnosis   • Essential (primary) hypertension   • Hyperlipidemia, unspecified   • Prediabetes   • Hypothyroidism, unspecified   • Other specified disorders of bone density and structure, unspecified site   • Vitamin D deficiency, unspecified   • Major depressive disorder, recurrent, moderate (HCC)   • Primary generalized (osteo)arthritis   • Spinal stenosis, lumbar region without neurogenic  "claudication   • Chronic pain   • Low back pain   • Other long term (current) drug therapy   • Spondylosis without myelopathy   • Renal insufficiency   • Degeneration of lumbar intervertebral disc   • Anxiety     Advance Care Planning  Advance Directive is on file.  ACP discussion was held with the patient during this visit. Patient has an advance directive in EMR which is still valid.     Review of Systems   Constitutional: Negative for chills and fever.   HENT: Negative for ear pain and sore throat.    Eyes: Negative for photophobia and visual disturbance.   Respiratory: Negative for cough and shortness of breath.    Cardiovascular: Negative for chest pain and palpitations.   Gastrointestinal: Negative for abdominal pain and rectal pain.   Skin: Negative for rash.   Neurological: Negative for seizures and syncope.   Psychiatric/Behavioral: Negative for hallucinations and suicidal ideas.        Objective    Vitals:    12/10/21 1039   BP: 122/55   Pulse: 71   Temp: 97.7 °F (36.5 °C)   SpO2: 96%   Weight: 104 kg (230 lb)   Height: 152.4 cm (60\")     BMI Readings from Last 1 Encounters:   12/10/21 44.92 kg/m²   BMI is above normal parameters. Recommendations include: educational material    Does the patient have evidence of cognitive impairment? No    Physical Exam  Vitals reviewed.   Constitutional:       General: She is not in acute distress.     Appearance: Normal appearance. She is well-developed.   HENT:      Head: Normocephalic and atraumatic.   Cardiovascular:      Rate and Rhythm: Normal rate and regular rhythm.   Pulmonary:      Effort: Pulmonary effort is normal.      Breath sounds: Normal breath sounds.   Neurological:      Mental Status: She is alert and oriented to person, place, and time.   Psychiatric:         Mood and Affect: Mood and affect normal.                 HEALTH RISK ASSESSMENT    Smoking Status:  Social History     Tobacco Use   Smoking Status Never Smoker   Smokeless Tobacco Never Used "     Alcohol Consumption:  Social History     Substance and Sexual Activity   Alcohol Use Not Currently     Fall Risk Screen:    ARIELLEADI Fall Risk Assessment was completed, and patient is at MODERATE risk for falls. Assessment completed on:12/10/2021    Depression Screening:  PHQ-2/PHQ-9 Depression Screening 12/10/2021   Little interest or pleasure in doing things 0   Feeling down, depressed, or hopeless 0   Total Score 0       Health Habits and Functional and Cognitive Screening:  Functional & Cognitive Status 12/10/2021   Do you have difficulty preparing food and eating? No   Do you have difficulty bathing yourself, getting dressed or grooming yourself? No   Do you have difficulty using the toilet? No   Do you have difficulty moving around from place to place? No   Do you have trouble with steps or getting out of a bed or a chair? No   Current Diet Well Balanced Diet   Dental Exam Up to date        Dental Exam Comment last month   Eye Exam Not up to date        Eye Exam Comment 2 yrs   Exercise (times per week) 5 times per week   Current Exercises Include Walking;House Cleaning;Gardening   Do you need help using the phone?  No   Are you deaf or do you have serious difficulty hearing?  No   Do you need help with transportation? No   Do you need help shopping? No   Do you need help preparing meals?  No   Do you need help with housework?  No   Do you need help with laundry? No   Do you need help taking your medications? No   Do you need help managing money? No   Do you ever drive or ride in a car without wearing a seat belt? No   Have you felt unusual stress, anger or loneliness in the last month? No   Who do you live with? Spouse   If you need help, do you have trouble finding someone available to you? No   Have you been bothered in the last four weeks by sexual problems? No   Do you have difficulty concentrating, remembering or making decisions? Yes       Age-appropriate Screening Schedule:  Refer to the list below  for future screening recommendations based on patient's age, sex and/or medical conditions. Orders for these recommended tests are listed in the plan section. The patient has been provided with a written plan.    Health Maintenance   Topic Date Due   • DXA SCAN  07/30/2021   • ZOSTER VACCINE (1 of 2) 12/10/2021 (Originally 7/23/2001)   • TDAP/TD VACCINES (1 - Tdap) 12/10/2022 (Originally [birth year]1970)   • LIPID PANEL  01/05/2022   • MAMMOGRAM  12/07/2023   • INFLUENZA VACCINE  Completed   • DIABETIC FOOT EXAM  Discontinued   • HEMOGLOBIN A1C  Discontinued   • DIABETIC EYE EXAM  Discontinued   • URINE MICROALBUMIN  Discontinued              Assessment/Plan   CMS Preventative Services Quick Reference  Risk Factors Identified During Encounter  Fall Risk-High or Moderate  Immunizations Discussed/Encouraged (specific Immunizations; Tdap and Shingrix  The above risks/problems have been discussed with the patient.  Follow up actions/plans if indicated are seen below in the Assessment/Plan Section.  Pertinent information has been shared with the patient in the After Visit Summary.    Diagnoses and all orders for this visit:    1. Medicare annual wellness visit, subsequent (Primary)  Comments:  Counseled health maintenance recommendations, fall prevention.     2. Post-menopausal  -     DEXA Bone Density Axial; Future    3. Screening for colorectal cancer  -     Cologuard - Stool, Per Rectum; Future    4. Hypothyroidism, unspecified type  Comments:  Medical condition is stable.  Continue same therapy.  Will recheck at next regular appointment.  Orders:  -     levothyroxine (SYNTHROID, LEVOTHROID) 25 MCG tablet; Take 1 tablet by mouth Daily.  Dispense: 90 tablet; Refill: 1  -     TSH; Future    5. Essential (primary) hypertension  Comments:  Medical condition is stable.  Continue same therapy.  Will recheck at next regular appointment.  Orders:  -     losartan (COZAAR) 100 MG tablet; Take 1 tablet by mouth Daily. PT MUST HAVE  APPT FOR FURTHER REFILLS  Dispense: 90 tablet; Refill: 1  -     metoprolol succinate XL (TOPROL-XL) 25 MG 24 hr tablet; Take 1 tablet by mouth Daily.  Dispense: 90 tablet; Refill: 1  -     chlorthalidone (HYGROTON) 25 MG tablet; Take 1 tablet by mouth Daily.  Dispense: 90 tablet; Refill: 1    6. Prediabetes  Comments:  Continue reduced sugar, carbohydrate diet.  Orders:  -     metFORMIN (GLUCOPHAGE) 500 MG tablet; Take 1 tablet by mouth 2 (Two) Times a Day.  Dispense: 180 tablet; Refill: 1  -     Hemoglobin A1c; Future    7. Primary generalized (osteo)arthritis  -     ibuprofen (ADVIL,MOTRIN) 800 MG tablet; Take 1 tablet by mouth As Needed for Moderate Pain .  Dispense: 90 tablet; Refill: 1    8. Mixed hyperlipidemia  Comments:  Medical condition is stable.  Continue same therapy.  Will recheck at next regular appointment.  Orders:  -     atorvastatin (LIPITOR) 10 MG tablet; Take 1 tablet by mouth every night at bedtime.  Dispense: 90 tablet; Refill: 1  -     Comprehensive metabolic panel; Future    9. Major depressive disorder, recurrent, moderate (HCC)  Comments:  Medical condition is stable.  Continue same therapy.  Will recheck at next regular appointment.  Orders:  -     buPROPion (WELLBUTRIN) 100 MG tablet; Take 1 tablet by mouth Daily.  Dispense: 90 tablet; Refill: 1    10. Screening for viral disease  -     Hepatitis C antibody; Future    11. Other long term (current) drug therapy  Comments:  Stable on current treatment  Orders:  -     gabapentin (NEURONTIN) 100 MG capsule; Take 1 capsule by mouth 2 (Two) Times a Day As Needed (nerve pain).  Dispense: 180 capsule; Refill: 1  -     traMADol (ULTRAM) 50 MG tablet; Take 1 tablet by mouth 2 (Two) Times a Day As Needed for Severe Pain . for pain  Dispense: 60 tablet; Refill: 0        Follow Up:   Return in about 3 months (around 3/10/2022) for Recheck.     An After Visit Summary and PPPS were made available to the patient.

## 2021-12-19 DIAGNOSIS — Z79.899 OTHER LONG TERM (CURRENT) DRUG THERAPY: ICD-10-CM

## 2021-12-20 RX ORDER — TRAMADOL HYDROCHLORIDE 50 MG/1
TABLET ORAL
Qty: 60 TABLET | Refills: 0 | OUTPATIENT
Start: 2021-12-20

## 2022-02-03 DIAGNOSIS — I10 ESSENTIAL (PRIMARY) HYPERTENSION: ICD-10-CM

## 2022-02-03 RX ORDER — METOPROLOL SUCCINATE 25 MG/1
TABLET, EXTENDED RELEASE ORAL
Qty: 90 TABLET | Refills: 0 | OUTPATIENT
Start: 2022-02-03

## 2022-02-08 DIAGNOSIS — I10 ESSENTIAL (PRIMARY) HYPERTENSION: ICD-10-CM

## 2022-02-08 RX ORDER — METOPROLOL SUCCINATE 25 MG/1
25 TABLET, EXTENDED RELEASE ORAL DAILY
Qty: 90 TABLET | Refills: 1 | Status: SHIPPED | OUTPATIENT
Start: 2022-02-08 | End: 2022-03-10 | Stop reason: SDUPTHER

## 2022-02-08 NOTE — TELEPHONE ENCOUNTER
Caller: Kim Owen    Relationship: Self    Best call back number: [phone]    Requested Prescriptions:   Requested Prescriptions     Pending Prescriptions Disp Refills   • metoprolol succinate XL (TOPROL-XL) 25 MG 24 hr tablet 90 tablet 1     Sig: Take 1 tablet by mouth Daily.        Pharmacy where request should be sent: [address] [phone] [facility] [phone] [facility]        Does the patient have less than a 3 day supply:  [x] Yes  [] No    Elidia Samuels Rep   02/08/22 13:24 EST

## 2022-03-10 ENCOUNTER — OFFICE VISIT (OUTPATIENT)
Dept: FAMILY MEDICINE CLINIC | Age: 72
End: 2022-03-10

## 2022-03-10 VITALS
HEART RATE: 66 BPM | BODY MASS INDEX: 46.13 KG/M2 | SYSTOLIC BLOOD PRESSURE: 137 MMHG | WEIGHT: 235 LBS | DIASTOLIC BLOOD PRESSURE: 76 MMHG | TEMPERATURE: 97.5 F | HEIGHT: 60 IN

## 2022-03-10 DIAGNOSIS — M15.0 PRIMARY GENERALIZED (OSTEO)ARTHRITIS: ICD-10-CM

## 2022-03-10 DIAGNOSIS — E03.9 HYPOTHYROIDISM, UNSPECIFIED TYPE: ICD-10-CM

## 2022-03-10 DIAGNOSIS — R73.03 PREDIABETES: ICD-10-CM

## 2022-03-10 DIAGNOSIS — I10 ESSENTIAL (PRIMARY) HYPERTENSION: ICD-10-CM

## 2022-03-10 DIAGNOSIS — F33.1 MAJOR DEPRESSIVE DISORDER, RECURRENT, MODERATE: ICD-10-CM

## 2022-03-10 DIAGNOSIS — E78.2 MIXED HYPERLIPIDEMIA: Primary | ICD-10-CM

## 2022-03-10 DIAGNOSIS — Z79.899 OTHER LONG TERM (CURRENT) DRUG THERAPY: ICD-10-CM

## 2022-03-10 PROCEDURE — 80305 DRUG TEST PRSMV DIR OPT OBS: CPT | Performed by: NURSE PRACTITIONER

## 2022-03-10 PROCEDURE — 99214 OFFICE O/P EST MOD 30 MIN: CPT | Performed by: NURSE PRACTITIONER

## 2022-03-10 RX ORDER — IBUPROFEN 800 MG/1
800 TABLET ORAL AS NEEDED
Qty: 90 TABLET | Refills: 1 | Status: SHIPPED | OUTPATIENT
Start: 2022-03-10 | End: 2023-01-11 | Stop reason: HOSPADM

## 2022-03-10 RX ORDER — METOPROLOL SUCCINATE 25 MG/1
25 TABLET, EXTENDED RELEASE ORAL DAILY
Qty: 90 TABLET | Refills: 1 | Status: SHIPPED | OUTPATIENT
Start: 2022-03-10 | End: 2022-06-15 | Stop reason: SDUPTHER

## 2022-03-10 RX ORDER — LEVOTHYROXINE SODIUM 0.03 MG/1
25 TABLET ORAL DAILY
Qty: 90 TABLET | Refills: 1 | Status: SHIPPED | OUTPATIENT
Start: 2022-03-10 | End: 2022-06-15 | Stop reason: SDUPTHER

## 2022-03-10 RX ORDER — ATORVASTATIN CALCIUM 10 MG/1
10 TABLET, FILM COATED ORAL
Qty: 90 TABLET | Refills: 1 | Status: SHIPPED | OUTPATIENT
Start: 2022-03-10 | End: 2022-06-15 | Stop reason: SDUPTHER

## 2022-03-10 RX ORDER — BUPROPION HYDROCHLORIDE 100 MG/1
100 TABLET ORAL DAILY
Qty: 90 TABLET | Refills: 1 | Status: SHIPPED | OUTPATIENT
Start: 2022-03-10 | End: 2022-04-27

## 2022-03-10 RX ORDER — TRAMADOL HYDROCHLORIDE 50 MG/1
50 TABLET ORAL 2 TIMES DAILY PRN
Qty: 60 TABLET | Refills: 0 | Status: SHIPPED | OUTPATIENT
Start: 2022-03-10 | End: 2022-06-15

## 2022-03-10 RX ORDER — LOSARTAN POTASSIUM 100 MG/1
100 TABLET ORAL DAILY
Qty: 90 TABLET | Refills: 1 | Status: SHIPPED | OUTPATIENT
Start: 2022-03-10 | End: 2022-06-15 | Stop reason: SDUPTHER

## 2022-03-10 RX ORDER — CHLORTHALIDONE 25 MG/1
25 TABLET ORAL DAILY
Qty: 90 TABLET | Refills: 1 | Status: SHIPPED | OUTPATIENT
Start: 2022-03-10 | End: 2022-06-15 | Stop reason: SDUPTHER

## 2022-03-10 NOTE — PROGRESS NOTES
Chief Complaint  Kim Owen presents to [facility] for Hypothyroidism (3 month follow up )    Subjective          History of Present Illness    Kim is here for follow-up on chronic issues.  She is on losartan 100 mg daily , chlorthalidone 25 mg daily, Toprol XL 25 mg daily for hypertension..   On atorvastatin for hyperlipidemia.   Taking Metformin and watching diet for prediabetes.   On levothyroxine for hypothyroidism.  Osteopenia on bone density scan. Was previously taking calcium but was taken off as prior provider told her she had calcium in her urine but gets in diet. Vitamin D3 68063 daily. Also takes MVI.   Taking Wellbutrin for depression.   She has had lumbar fusion L4-L5 last year. Dr Akhtar did her surgery. She is on gabapentin for nerve pain. And Tramadol as well. She takes sparingly. Also taking Ibuprofen on occasion.   Abnormal kidney function labs on labs previously. Creatinine around 1.1 typically.     Review of Systems      No Known Allergies   Past Medical History:   Diagnosis Date   • Chronic pain     BACK   • Essential (primary) hypertension    • Hyperlipidemia, unspecified    • Hypothyroidism, unspecified    • Low back pain    • Major depressive disorder, recurrent, moderate (HCC)    • Other long term (current) drug therapy    • Other specified disorders of bone density and structure, unspecified site    • Prediabetes    • Primary generalized (osteo)arthritis    • Spinal stenosis, lumbar region without neurogenic claudication    • Vitamin D deficiency, unspecified    • Zoster without complications      Current Outpatient Medications   Medication Sig Dispense Refill   • atorvastatin (LIPITOR) 10 MG tablet Take 1 tablet by mouth every night at bedtime. 90 tablet 1   • buPROPion (WELLBUTRIN) 100 MG tablet Take 1 tablet by mouth Daily. 90 tablet 1   • chlorthalidone (HYGROTON) 25 MG tablet Take 1 tablet by mouth Daily. 90 tablet 1   • gabapentin (NEURONTIN)  "100 MG capsule Take 1 capsule by mouth 2 (Two) Times a Day As Needed (nerve pain). 180 capsule 1   • ibuprofen (ADVIL,MOTRIN) 800 MG tablet Take 1 tablet by mouth As Needed for Moderate Pain . 90 tablet 1   • levothyroxine (SYNTHROID, LEVOTHROID) 25 MCG tablet Take 1 tablet by mouth Daily. 90 tablet 1   • losartan (COZAAR) 100 MG tablet Take 1 tablet by mouth Daily. PT MUST HAVE APPT FOR FURTHER REFILLS 90 tablet 1   • metFORMIN (GLUCOPHAGE) 500 MG tablet Take 1 tablet by mouth 2 (Two) Times a Day. 180 tablet 1   • metoprolol succinate XL (TOPROL-XL) 25 MG 24 hr tablet Take 1 tablet by mouth Daily. 90 tablet 1   • traMADol (ULTRAM) 50 MG tablet Take 1 tablet by mouth 2 (Two) Times a Day As Needed for Severe Pain . for pain 60 tablet 0     No current facility-administered medications for this visit.     Past Surgical History:   Procedure Laterality Date   • COLONOSCOPY  2012   • HYSTERECTOMY      AGE 48  ENDOMETRIOSIS, MENORRHAGIA   • JOINT REPLACEMENT Right 2016    KNEE   • SPINE SURGERY      L5-S1    DR. DEAN HONEYCUTT  5/21      Social History     Tobacco Use   • Smoking status: Never Smoker   • Smokeless tobacco: Never Used   Vaping Use   • Vaping Use: Never used   Substance Use Topics   • Alcohol use: Not Currently   • Drug use: Never     Family History   Problem Relation Age of Onset   • Diabetes type II Father    • Alcohol abuse Father      Health Maintenance Due   Topic Date Due   • DXA SCAN  07/30/2021   • LIPID PANEL  01/05/2022      Immunization History   Administered Date(s) Administered   • COVID-19 (PFIZER) PURPLE CAP 03/15/2021, 04/05/2021, 12/02/2021   • Fluzone High-Dose 65+yrs 10/18/2021   • Influenza, Unspecified 12/02/2020   • Pneumococcal Polysaccharide (PPSV23) 12/02/2020        Objective     Vitals:    03/10/22 1033   BP: 137/76   BP Location: Left arm   Patient Position: Sitting   Pulse: 66   Temp: 97.5 °F (36.4 °C)   TempSrc: Oral   Weight: 107 kg (235 lb)   Height: 152.4 cm (60\")     Body " mass index is 45.9 kg/m².     Physical Exam  Vitals reviewed.   Constitutional:       General: She is not in acute distress.     Appearance: Normal appearance. She is well-developed.   HENT:      Head: Normocephalic and atraumatic.   Cardiovascular:      Rate and Rhythm: Normal rate and regular rhythm.   Pulmonary:      Effort: Pulmonary effort is normal.      Breath sounds: Normal breath sounds.   Neurological:      Mental Status: She is alert and oriented to person, place, and time.   Psychiatric:         Mood and Affect: Mood and affect normal.           Result Review :     The following data was reviewed by: MINGO Mccrary on 03/10/2022:          Cologuard - , (12/20/2021 16:00)  Hemoglobin A1c (12/10/2021 11:35)  Comprehensive metabolic panel (12/10/2021 11:35)  TSH (12/10/2021 11:35)  Hepatitis C antibody (12/10/2021 11:35)  POC Urine Drug Screen [facility] (09/09/2021 12:23)  POC Urine Drug Screen [facility] (03/10/2022 11:10)                  Assessment and Plan      Diagnoses and all orders for this visit:    1. Mixed hyperlipidemia (Primary)  Comments:  Medical condition is stable.  Continue same therapy.  Will recheck at next regular appointment.  Orders:  -     atorvastatin (LIPITOR) 10 MG tablet; Take 1 tablet by mouth every night at bedtime.  Dispense: 90 tablet; Refill: 1  -     Cancel: Comprehensive Metabolic Panel; Future  -     Cancel: Lipid Panel; Future    2. Major depressive disorder, recurrent, moderate (HCC)  Comments:  Medical condition is stable.  Continue same therapy.  Will recheck at next regular appointment.  Orders:  -     buPROPion (WELLBUTRIN) 100 MG tablet; Take 1 tablet by mouth Daily.  Dispense: 90 tablet; Refill: 1    3. Essential (primary) hypertension  Comments:  Medical condition is stable.  Continue same therapy.  Will recheck at next regular appointment.  Orders:  -     chlorthalidone (HYGROTON) 25 MG tablet; Take 1 tablet by mouth Daily.  Dispense: 90 tablet;  Refill: 1  -     losartan (COZAAR) 100 MG tablet; Take 1 tablet by mouth Daily. PT MUST HAVE APPT FOR FURTHER REFILLS  Dispense: 90 tablet; Refill: 1  -     metoprolol succinate XL (TOPROL-XL) 25 MG 24 hr tablet; Take 1 tablet by mouth Daily.  Dispense: 90 tablet; Refill: 1    4. Hypothyroidism, unspecified type  Comments:  Medical condition is stable.  Continue same therapy.  Will recheck at next regular appointment.  Orders:  -     levothyroxine (SYNTHROID, LEVOTHROID) 25 MCG tablet; Take 1 tablet by mouth Daily.  Dispense: 90 tablet; Refill: 1  -     Cancel: TSH; Future    5. Prediabetes  Comments:  Continue reduced sugar, carbohydrate diet.  Orders:  -     metFORMIN (GLUCOPHAGE) 500 MG tablet; Take 1 tablet by mouth 2 (Two) Times a Day.  Dispense: 180 tablet; Refill: 1  -     Cancel: Hemoglobin A1c; Future    6. Primary generalized (osteo)arthritis  Comments:  Medical condition is stable.  Continue same therapy.  Will recheck at next regular appointment.  Orders:  -     ibuprofen (ADVIL,MOTRIN) 800 MG tablet; Take 1 tablet by mouth As Needed for Moderate Pain .  Dispense: 90 tablet; Refill: 1  -     traMADol (ULTRAM) 50 MG tablet; Take 1 tablet by mouth 2 (Two) Times a Day As Needed for Severe Pain . for pain  Dispense: 60 tablet; Refill: 0    7. Other long term (current) drug therapy  -     POC Urine Drug Screen [facility]  -     traMADol (ULTRAM) 50 MG tablet; Take 1 tablet by mouth 2 (Two) Times a Day As Needed for Severe Pain . for pain  Dispense: 60 tablet; Refill: 0      Controlled substance documentation: Sean reviewed; drug screen performed and appropriate; consent is reviewed and signed and on the chart.  Is aware of risk of addiction on this medication, understands will need to follow up for a review at least every 3 months and medications will be adjusted or decreased as deemed appropriate at each visit.  No history of drug or alcohol abuse.  No concerns about diversion or abuse. Denies side  effects related to the medication.  Aware may be called in for pill counts.  The dosing of this medication will be reviewed on a regular basis and reduced if possible.           Follow Up {Instructions Charge Capture  Follow-up Communications :23}    Return in about 3 months (around 6/10/2022) for Recheck.

## 2022-03-24 ENCOUNTER — HOSPITAL ENCOUNTER (OUTPATIENT)
Dept: BONE DENSITY | Facility: HOSPITAL | Age: 72
Discharge: HOME | End: 2022-03-24
Admitting: NURSE PRACTITIONER

## 2022-03-24 DIAGNOSIS — Z78.0 POST-MENOPAUSAL: ICD-10-CM

## 2022-03-24 PROCEDURE — 77080 DXA BONE DENSITY AXIAL: CPT

## 2022-04-26 DIAGNOSIS — F33.1 MAJOR DEPRESSIVE DISORDER, RECURRENT, MODERATE: ICD-10-CM

## 2022-04-27 RX ORDER — BUPROPION HYDROCHLORIDE 100 MG/1
TABLET ORAL
Qty: 90 TABLET | Refills: 0 | Status: SHIPPED | OUTPATIENT
Start: 2022-04-27 | End: 2022-06-15 | Stop reason: SDUPTHER

## 2022-06-10 DIAGNOSIS — R73.03 PREDIABETES: ICD-10-CM

## 2022-06-15 ENCOUNTER — LAB (OUTPATIENT)
Dept: LAB | Facility: HOSPITAL | Age: 72
End: 2022-06-15

## 2022-06-15 ENCOUNTER — OFFICE VISIT (OUTPATIENT)
Dept: FAMILY MEDICINE CLINIC | Age: 72
End: 2022-06-15

## 2022-06-15 VITALS
TEMPERATURE: 98.1 F | WEIGHT: 233 LBS | HEART RATE: 63 BPM | HEIGHT: 60 IN | SYSTOLIC BLOOD PRESSURE: 126 MMHG | BODY MASS INDEX: 45.75 KG/M2 | DIASTOLIC BLOOD PRESSURE: 74 MMHG

## 2022-06-15 DIAGNOSIS — R53.83 OTHER FATIGUE: ICD-10-CM

## 2022-06-15 DIAGNOSIS — I10 ESSENTIAL (PRIMARY) HYPERTENSION: ICD-10-CM

## 2022-06-15 DIAGNOSIS — E03.9 HYPOTHYROIDISM, UNSPECIFIED TYPE: ICD-10-CM

## 2022-06-15 DIAGNOSIS — I10 ESSENTIAL (PRIMARY) HYPERTENSION: Primary | ICD-10-CM

## 2022-06-15 DIAGNOSIS — E78.2 MIXED HYPERLIPIDEMIA: ICD-10-CM

## 2022-06-15 DIAGNOSIS — R73.03 PREDIABETES: ICD-10-CM

## 2022-06-15 DIAGNOSIS — F33.1 MAJOR DEPRESSIVE DISORDER, RECURRENT, MODERATE: ICD-10-CM

## 2022-06-15 PROBLEM — Z98.1 S/P LUMBAR FUSION: Status: ACTIVE | Noted: 2022-06-13

## 2022-06-15 LAB
ALBUMIN SERPL-MCNC: 4.6 G/DL (ref 3.5–5.2)
ALBUMIN/GLOB SERPL: 1.8 G/DL
ALP SERPL-CCNC: 67 U/L (ref 39–117)
ALT SERPL W P-5'-P-CCNC: 16 U/L (ref 1–33)
ANION GAP SERPL CALCULATED.3IONS-SCNC: 14.7 MMOL/L (ref 5–15)
AST SERPL-CCNC: 17 U/L (ref 1–32)
BASOPHILS # BLD AUTO: 0.05 10*3/MM3 (ref 0–0.2)
BASOPHILS NFR BLD AUTO: 0.6 % (ref 0–1.5)
BILIRUB SERPL-MCNC: 0.5 MG/DL (ref 0–1.2)
BUN SERPL-MCNC: 18 MG/DL (ref 8–23)
BUN/CREAT SERPL: 18.2 (ref 7–25)
CALCIUM SPEC-SCNC: 10 MG/DL (ref 8.6–10.5)
CHLORIDE SERPL-SCNC: 98 MMOL/L (ref 98–107)
CHOLEST SERPL-MCNC: 131 MG/DL (ref 0–200)
CO2 SERPL-SCNC: 25.3 MMOL/L (ref 22–29)
CREAT SERPL-MCNC: 0.99 MG/DL (ref 0.57–1)
DEPRECATED RDW RBC AUTO: 42.7 FL (ref 37–54)
EGFRCR SERPLBLD CKD-EPI 2021: 61.5 ML/MIN/1.73
EOSINOPHIL # BLD AUTO: 0.42 10*3/MM3 (ref 0–0.4)
EOSINOPHIL NFR BLD AUTO: 5.3 % (ref 0.3–6.2)
ERYTHROCYTE [DISTWIDTH] IN BLOOD BY AUTOMATED COUNT: 13.1 % (ref 12.3–15.4)
GLOBULIN UR ELPH-MCNC: 2.5 GM/DL
GLUCOSE SERPL-MCNC: 111 MG/DL (ref 65–99)
HBA1C MFR BLD: 6.6 % (ref 4.8–5.6)
HCT VFR BLD AUTO: 40.3 % (ref 34–46.6)
HDLC SERPL-MCNC: 41 MG/DL (ref 40–60)
HGB BLD-MCNC: 13.4 G/DL (ref 12–15.9)
IMM GRANULOCYTES # BLD AUTO: 0.01 10*3/MM3 (ref 0–0.05)
IMM GRANULOCYTES NFR BLD AUTO: 0.1 % (ref 0–0.5)
LDLC SERPL CALC-MCNC: 67 MG/DL (ref 0–100)
LDLC/HDLC SERPL: 1.58 {RATIO}
LYMPHOCYTES # BLD AUTO: 2.35 10*3/MM3 (ref 0.7–3.1)
LYMPHOCYTES NFR BLD AUTO: 29.6 % (ref 19.6–45.3)
MCH RBC QN AUTO: 29.2 PG (ref 26.6–33)
MCHC RBC AUTO-ENTMCNC: 33.3 G/DL (ref 31.5–35.7)
MCV RBC AUTO: 87.8 FL (ref 79–97)
MONOCYTES # BLD AUTO: 0.9 10*3/MM3 (ref 0.1–0.9)
MONOCYTES NFR BLD AUTO: 11.3 % (ref 5–12)
NEUTROPHILS NFR BLD AUTO: 4.21 10*3/MM3 (ref 1.7–7)
NEUTROPHILS NFR BLD AUTO: 53.1 % (ref 42.7–76)
PLATELET # BLD AUTO: 264 10*3/MM3 (ref 140–450)
PMV BLD AUTO: 10.3 FL (ref 6–12)
POTASSIUM SERPL-SCNC: 3.8 MMOL/L (ref 3.5–5.2)
PROT SERPL-MCNC: 7.1 G/DL (ref 6–8.5)
RBC # BLD AUTO: 4.59 10*6/MM3 (ref 3.77–5.28)
SODIUM SERPL-SCNC: 138 MMOL/L (ref 136–145)
TRIGL SERPL-MCNC: 126 MG/DL (ref 0–150)
TSH SERPL DL<=0.05 MIU/L-ACNC: 1.66 UIU/ML (ref 0.27–4.2)
VLDLC SERPL-MCNC: 23 MG/DL (ref 5–40)
WBC NRBC COR # BLD: 7.94 10*3/MM3 (ref 3.4–10.8)

## 2022-06-15 PROCEDURE — 99214 OFFICE O/P EST MOD 30 MIN: CPT | Performed by: NURSE PRACTITIONER

## 2022-06-15 PROCEDURE — 84443 ASSAY THYROID STIM HORMONE: CPT

## 2022-06-15 PROCEDURE — 85025 COMPLETE CBC W/AUTO DIFF WBC: CPT

## 2022-06-15 PROCEDURE — 80061 LIPID PANEL: CPT

## 2022-06-15 PROCEDURE — 83036 HEMOGLOBIN GLYCOSYLATED A1C: CPT

## 2022-06-15 PROCEDURE — 93000 ELECTROCARDIOGRAM COMPLETE: CPT | Performed by: FAMILY MEDICINE

## 2022-06-15 PROCEDURE — 80053 COMPREHEN METABOLIC PANEL: CPT

## 2022-06-15 PROCEDURE — 36415 COLL VENOUS BLD VENIPUNCTURE: CPT

## 2022-06-15 RX ORDER — BUPROPION HYDROCHLORIDE 100 MG/1
100 TABLET ORAL DAILY
Qty: 90 TABLET | Refills: 1 | Status: SHIPPED | OUTPATIENT
Start: 2022-06-15 | End: 2022-12-14 | Stop reason: SDUPTHER

## 2022-06-15 RX ORDER — LEVOTHYROXINE SODIUM 0.03 MG/1
25 TABLET ORAL DAILY
Qty: 90 TABLET | Refills: 1 | Status: SHIPPED | OUTPATIENT
Start: 2022-06-15 | End: 2022-10-27

## 2022-06-15 RX ORDER — METOPROLOL SUCCINATE 25 MG/1
25 TABLET, EXTENDED RELEASE ORAL DAILY
Qty: 90 TABLET | Refills: 1 | Status: SHIPPED | OUTPATIENT
Start: 2022-06-15 | End: 2022-08-08

## 2022-06-15 RX ORDER — ATORVASTATIN CALCIUM 10 MG/1
10 TABLET, FILM COATED ORAL
Qty: 90 TABLET | Refills: 1 | Status: SHIPPED | OUTPATIENT
Start: 2022-06-15 | End: 2022-10-27 | Stop reason: SDUPTHER

## 2022-06-15 RX ORDER — CHLORTHALIDONE 25 MG/1
25 TABLET ORAL DAILY
Qty: 90 TABLET | Refills: 1 | Status: SHIPPED | OUTPATIENT
Start: 2022-06-15 | End: 2022-09-12 | Stop reason: SDUPTHER

## 2022-06-15 RX ORDER — LOSARTAN POTASSIUM 100 MG/1
100 TABLET ORAL DAILY
Qty: 90 TABLET | Refills: 1 | Status: SHIPPED | OUTPATIENT
Start: 2022-06-15 | End: 2022-09-12 | Stop reason: SDUPTHER

## 2022-06-15 NOTE — PROGRESS NOTES
Procedure     ECG 12 Lead    Date/Time: 6/15/2022 12:36 PM  Performed by: Candy Bess MD  Authorized by: Lindy Chavarria APRN   Comparison: not compared with previous ECG   Rhythm: sinus rhythm  Rate: normal  BPM: 61  Conduction: conduction normal  ST Segments: ST segments normal  T Waves: T waves normal  QRS axis: normal  Other findings: poor R wave progression  Comments: Poor R-wave progression with early transition, otherwise no significant abnormalities.  NIEVES

## 2022-06-15 NOTE — PROGRESS NOTES
Chief Complaint  Kim Owen presents to [facility] for Hyperlipidemia (3 month follow up)    Subjective          History of Present Illness    Kim is here for follow-up on chronic issues.  She is on losartan 100 mg daily, chlorthalidone 25 mg daily, Toprol XL 25 mg daily for hypertension..   On atorvastatin for hyperlipidemia.   Taking Metformin and watching diet for prediabetes.   On levothyroxine for hypothyroidism.  Osteopenia on bone density scan. Was previously taking calcium but was taken off as prior provider told her she had calcium in her urine but gets in diet. Vitamin D3 21249 daily. Also takes MVI.   Taking Wellbutrin for depression. Notes doing well.  She has had lumbar fusion L4-L5 last year. Dr Akhtar did her surgery. Was previously on gabapentin and Tramadol for pain. She has stopped taking this. She is taking Ibuprofen. Has seen surgeon recently and advised follow up one year.    She notes some fatigue with activity. Relieved with rest. This started about 6 months ago. Does not occur daily but on occasion. She does forget to take her metformin on some days.     Review of Systems      No Known Allergies   Past Medical History:   Diagnosis Date   • Chronic pain     BACK   • Essential (primary) hypertension    • Hyperlipidemia, unspecified    • Hypothyroidism, unspecified    • Low back pain    • Major depressive disorder, recurrent, moderate (HCC)    • Other long term (current) drug therapy    • Other specified disorders of bone density and structure, unspecified site    • Prediabetes    • Primary generalized (osteo)arthritis    • Spinal stenosis, lumbar region without neurogenic claudication    • Vitamin D deficiency, unspecified    • Zoster without complications      Current Outpatient Medications   Medication Sig Dispense Refill   • atorvastatin (LIPITOR) 10 MG tablet Take 1 tablet by mouth every night at bedtime. 90 tablet 1   • buPROPion (WELLBUTRIN) 100 MG  "tablet Take 1 tablet by mouth Daily. 90 tablet 1   • chlorthalidone (HYGROTON) 25 MG tablet Take 1 tablet by mouth Daily. 90 tablet 1   • ibuprofen (ADVIL,MOTRIN) 800 MG tablet Take 1 tablet by mouth As Needed for Moderate Pain . 90 tablet 1   • levothyroxine (SYNTHROID, LEVOTHROID) 25 MCG tablet Take 1 tablet by mouth Daily. 90 tablet 1   • losartan (COZAAR) 100 MG tablet Take 1 tablet by mouth Daily. 90 tablet 1   • metFORMIN (GLUCOPHAGE) 500 MG tablet Take 1 tablet by mouth 2 (Two) Times a Day. 180 tablet 1   • metoprolol succinate XL (TOPROL-XL) 25 MG 24 hr tablet Take 1 tablet by mouth Daily. 90 tablet 1     No current facility-administered medications for this visit.     Past Surgical History:   Procedure Laterality Date   • COLONOSCOPY  2012   • HYSTERECTOMY      AGE 48  ENDOMETRIOSIS, MENORRHAGIA   • JOINT REPLACEMENT Right 2016    KNEE   • SPINE SURGERY      L5-S1    DR. DEAN HONEYCUTT  5/21      Social History     Tobacco Use   • Smoking status: Never Smoker   • Smokeless tobacco: Never Used   Vaping Use   • Vaping Use: Never used   Substance Use Topics   • Alcohol use: Not Currently   • Drug use: Never     Family History   Problem Relation Age of Onset   • Diabetes type II Father    • Alcohol abuse Father      Health Maintenance Due   Topic Date Due   • LIPID PANEL  01/05/2022      Immunization History   Administered Date(s) Administered   • COVID-19 (PFIZER) PURPLE CAP 03/15/2021, 04/05/2021, 12/02/2021   • Fluzone High-Dose 65+yrs 10/18/2021   • Influenza, Unspecified 12/02/2020   • Pneumococcal Polysaccharide (PPSV23) 12/02/2020        Objective     Vitals:    06/15/22 1022   BP: 126/74   BP Location: Left arm   Patient Position: Sitting   Pulse: 63   Temp: 98.1 °F (36.7 °C)   TempSrc: Oral   Weight: 106 kg (233 lb)   Height: 152.4 cm (60\")     Body mass index is 45.5 kg/m².     Physical Exam  Vitals reviewed.   Constitutional:       General: She is not in acute distress.     Appearance: Normal " appearance. She is well-developed.   HENT:      Head: Normocephalic and atraumatic.   Cardiovascular:      Rate and Rhythm: Normal rate and regular rhythm.   Pulmonary:      Effort: Pulmonary effort is normal.      Breath sounds: Normal breath sounds.   Neurological:      Mental Status: She is alert and oriented to person, place, and time.   Psychiatric:         Mood and Affect: Mood and affect normal.           Result Review :     The following data was reviewed by: MINGO Mccrary on 06/15/2022:          POC Urine Drug Screen [facility] (03/10/2022 11:10)  Cologuard - , (12/20/2021 16:00)  Hemoglobin A1c (12/10/2021 11:35)  Comprehensive metabolic panel (12/10/2021 11:35)  TSH (12/10/2021 11:35)  Hepatitis C antibody (12/10/2021 11:35)                  Assessment and Plan      Diagnoses and all orders for this visit:    1. Essential (primary) hypertension (Primary)  Comments:  Medical condition is stable.  Continue same therapy.  Will recheck at next regular appointment.  Orders:  -     losartan (COZAAR) 100 MG tablet; Take 1 tablet by mouth Daily.  Dispense: 90 tablet; Refill: 1  -     chlorthalidone (HYGROTON) 25 MG tablet; Take 1 tablet by mouth Daily.  Dispense: 90 tablet; Refill: 1  -     metoprolol succinate XL (TOPROL-XL) 25 MG 24 hr tablet; Take 1 tablet by mouth Daily.  Dispense: 90 tablet; Refill: 1  -     CBC & Differential; Future  -     Comprehensive Metabolic Panel; Future  -     Lipid Panel; Future    2. Mixed hyperlipidemia  Comments:  Medical condition is stable.  Continue same therapy.  Will recheck at next regular appointment.  Orders:  -     atorvastatin (LIPITOR) 10 MG tablet; Take 1 tablet by mouth every night at bedtime.  Dispense: 90 tablet; Refill: 1    3. Major depressive disorder, recurrent, moderate (HCC)  Comments:  Medical condition is stable.  Continue same therapy.  Will recheck at next regular appointment.  Orders:  -     buPROPion (WELLBUTRIN) 100 MG tablet; Take 1  tablet by mouth Daily.  Dispense: 90 tablet; Refill: 1    4. Hypothyroidism, unspecified type  Comments:  Medical condition is stable.  Continue same therapy.  Will recheck at next regular appointment.  Orders:  -     levothyroxine (SYNTHROID, LEVOTHROID) 25 MCG tablet; Take 1 tablet by mouth Daily.  Dispense: 90 tablet; Refill: 1  -     TSH; Future    5. Other fatigue  Comments:  ECG without acute ST or T wave abnormalities. Checking labs.   Orders:  -     ECG 12 Lead    6. Prediabetes  Comments:  Continue healthy diet. Repeating labs  Orders:  -     metFORMIN (GLUCOPHAGE) 500 MG tablet; Take 1 tablet by mouth 2 (Two) Times a Day.  Dispense: 180 tablet; Refill: 1  -     Hemoglobin A1c; Future              Follow Up     Return in about 6 months (around 12/15/2022) for Medicare Wellness.

## 2022-06-16 PROBLEM — E11.9 TYPE 2 DIABETES MELLITUS WITHOUT COMPLICATION, WITHOUT LONG-TERM CURRENT USE OF INSULIN: Status: ACTIVE | Noted: 2022-06-16

## 2022-07-25 ENCOUNTER — LAB (OUTPATIENT)
Dept: LAB | Facility: HOSPITAL | Age: 72
End: 2022-07-25

## 2022-07-25 ENCOUNTER — OFFICE VISIT (OUTPATIENT)
Dept: FAMILY MEDICINE CLINIC | Age: 72
End: 2022-07-25

## 2022-07-25 VITALS
DIASTOLIC BLOOD PRESSURE: 77 MMHG | TEMPERATURE: 98.2 F | WEIGHT: 232 LBS | HEIGHT: 60 IN | HEART RATE: 74 BPM | BODY MASS INDEX: 45.55 KG/M2 | SYSTOLIC BLOOD PRESSURE: 121 MMHG

## 2022-07-25 DIAGNOSIS — R25.2 LEG CRAMP: ICD-10-CM

## 2022-07-25 DIAGNOSIS — R25.2 LEG CRAMP: Primary | ICD-10-CM

## 2022-07-25 LAB
ALBUMIN SERPL-MCNC: 4.4 G/DL (ref 3.5–5.2)
ALBUMIN/GLOB SERPL: 1.5 G/DL
ALP SERPL-CCNC: 64 U/L (ref 39–117)
ALT SERPL W P-5'-P-CCNC: 23 U/L (ref 1–33)
ANION GAP SERPL CALCULATED.3IONS-SCNC: 13.5 MMOL/L (ref 5–15)
AST SERPL-CCNC: 20 U/L (ref 1–32)
BILIRUB SERPL-MCNC: 0.5 MG/DL (ref 0–1.2)
BUN SERPL-MCNC: 17 MG/DL (ref 8–23)
BUN/CREAT SERPL: 17.5 (ref 7–25)
CALCIUM SPEC-SCNC: 10.2 MG/DL (ref 8.6–10.5)
CHLORIDE SERPL-SCNC: 96 MMOL/L (ref 98–107)
CO2 SERPL-SCNC: 28.5 MMOL/L (ref 22–29)
CREAT SERPL-MCNC: 0.97 MG/DL (ref 0.57–1)
EGFRCR SERPLBLD CKD-EPI 2021: 62.6 ML/MIN/1.73
GLOBULIN UR ELPH-MCNC: 2.9 GM/DL
GLUCOSE SERPL-MCNC: 102 MG/DL (ref 65–99)
MAGNESIUM SERPL-MCNC: 1.9 MG/DL (ref 1.6–2.4)
POTASSIUM SERPL-SCNC: 3.8 MMOL/L (ref 3.5–5.2)
PROT SERPL-MCNC: 7.3 G/DL (ref 6–8.5)
SODIUM SERPL-SCNC: 138 MMOL/L (ref 136–145)
TSH SERPL DL<=0.05 MIU/L-ACNC: 2.24 UIU/ML (ref 0.27–4.2)

## 2022-07-25 PROCEDURE — 36415 COLL VENOUS BLD VENIPUNCTURE: CPT

## 2022-07-25 PROCEDURE — 83735 ASSAY OF MAGNESIUM: CPT

## 2022-07-25 PROCEDURE — 84443 ASSAY THYROID STIM HORMONE: CPT

## 2022-07-25 PROCEDURE — 99214 OFFICE O/P EST MOD 30 MIN: CPT | Performed by: PHYSICIAN ASSISTANT

## 2022-07-25 PROCEDURE — 80053 COMPREHEN METABOLIC PANEL: CPT

## 2022-07-25 NOTE — PROGRESS NOTES
"Subjective     CHIEF COMPLAINT    Chief Complaint   Patient presents with   • Leg Pain     Leg and feet cramps X 4-5 months worsening over the last week             This is a 72-year-old female presenting to the clinic complaining of bilateral calf and thigh cramping for the last several months.  She states it has gotten more frequent over the last week or so.  She notices them mainly at night and states that the last 7 or 8 minutes at a time.  She has been stretching when her symptoms begin and has been taking a potassium and magnesium supplement.            Review of Systems   Constitutional: Negative for chills and fever.   Cardiovascular: Negative for leg swelling.   Gastrointestinal: Negative for nausea and vomiting.   Musculoskeletal: Positive for myalgias (\"leg cramps\" at night).   Skin: Negative for color change and rash.   Neurological: Negative for weakness and numbness.            Past Medical History:   Diagnosis Date   • Chronic pain     BACK   • Essential (primary) hypertension    • Hyperlipidemia, unspecified    • Hypothyroidism, unspecified    • Low back pain    • Major depressive disorder, recurrent, moderate (HCC)    • Other long term (current) drug therapy    • Other specified disorders of bone density and structure, unspecified site    • Prediabetes    • Primary generalized (osteo)arthritis    • Spinal stenosis, lumbar region without neurogenic claudication    • Vitamin D deficiency, unspecified    • Zoster without complications             Past Surgical History:   Procedure Laterality Date   • COLONOSCOPY  2012   • HYSTERECTOMY      AGE 48  ENDOMETRIOSIS, MENORRHAGIA   • JOINT REPLACEMENT Right 2016    KNEE   • SPINE SURGERY      L5-S1    DR. DEAN HONEYCUTT  5/21            Family History   Problem Relation Age of Onset   • Diabetes type II Father    • Alcohol abuse Father             Social History     Socioeconomic History   • Marital status:    Tobacco Use   • Smoking status: Never " "Smoker   • Smokeless tobacco: Never Used   Vaping Use   • Vaping Use: Never used   Substance and Sexual Activity   • Alcohol use: Not Currently   • Drug use: Never            No Known Allergies         Current Outpatient Medications on File Prior to Visit   Medication Sig Dispense Refill   • atorvastatin (LIPITOR) 10 MG tablet Take 1 tablet by mouth every night at bedtime. 90 tablet 1   • buPROPion (WELLBUTRIN) 100 MG tablet Take 1 tablet by mouth Daily. 90 tablet 1   • chlorthalidone (HYGROTON) 25 MG tablet Take 1 tablet by mouth Daily. 90 tablet 1   • ibuprofen (ADVIL,MOTRIN) 800 MG tablet Take 1 tablet by mouth As Needed for Moderate Pain . (Patient taking differently: Take 800 mg by mouth Every Other Day.) 90 tablet 1   • levothyroxine (SYNTHROID, LEVOTHROID) 25 MCG tablet Take 1 tablet by mouth Daily. 90 tablet 1   • losartan (COZAAR) 100 MG tablet Take 1 tablet by mouth Daily. 90 tablet 1   • metFORMIN (GLUCOPHAGE) 500 MG tablet Take 1 tablet by mouth 2 (Two) Times a Day. 180 tablet 1   • metoprolol succinate XL (TOPROL-XL) 25 MG 24 hr tablet Take 1 tablet by mouth Daily. 90 tablet 1     No current facility-administered medications on file prior to visit.            /77 (BP Location: Left arm, Patient Position: Sitting)   Pulse 74   Temp 98.2 °F (36.8 °C) (Oral)   Ht 152.4 cm (60\")   Wt 105 kg (232 lb)   BMI 45.31 kg/m²          Objective     Physical Exam  Vitals and nursing note reviewed.   Constitutional:       General: She is not in acute distress.     Appearance: Normal appearance.   Cardiovascular:      Pulses:           Dorsalis pedis pulses are 2+ on the right side and 2+ on the left side.        Posterior tibial pulses are 2+ on the right side and 2+ on the left side.   Pulmonary:      Effort: Pulmonary effort is normal. No respiratory distress.   Musculoskeletal:         General: No swelling or tenderness.      Right lower leg: No edema.      Left lower leg: No edema.      Comments: B/L " lower extremities   Skin:     General: Skin is warm and dry.      Findings: No erythema or rash.   Neurological:      Mental Status: She is alert and oriented to person, place, and time.   Psychiatric:         Mood and Affect: Mood normal.         Behavior: Behavior normal.              Procedures                    Lab Results (last 24 hours)     Procedure Component Value Units Date/Time    Comprehensive metabolic panel [[id number]] Collected: 07/25/22 1322    Specimen: Blood Updated: 07/25/22 1322    Magnesium [[id number]] Collected: 07/25/22 1322    Specimen: Blood Updated: 07/25/22 1322    TSH Rfx On Abnormal To Free T4 [[id number]] Collected: 07/25/22 1322    Specimen: Blood Updated: 07/25/22 1322                No Radiology Exams Resulted Within Past 24 Hours                    Diagnoses and all orders for this visit:    1. Leg cramp (Primary)  Comments:  Recommend increased stretching and walking daily. Possibly statin related? It appears this has been a chronic medication with no recent dose adjustment.  Orders:  -     Comprehensive metabolic panel; Future  -     Magnesium; Future  -     TSH Rfx On Abnormal To Free T4; Future              Additional Instructions for the Follow-ups that You Need to Schedule     Comprehensive metabolic panel    Jul 30, 2022 (Approximate)      Release to patient: Immediate         Magnesium    Jul 30, 2022 (Approximate)      Release to patient: Immediate         TSH Rfx On Abnormal To Free T4    Jul 30, 2022 (Approximate)      Release to patient: Immediate                         FOR FULL DISCHARGE INSTRUCTIONS/COMMENTS/HANDOUTS please see the AVS

## 2022-08-07 DIAGNOSIS — I10 ESSENTIAL (PRIMARY) HYPERTENSION: ICD-10-CM

## 2022-08-08 RX ORDER — METOPROLOL SUCCINATE 25 MG/1
TABLET, EXTENDED RELEASE ORAL
Qty: 90 TABLET | Refills: 0 | Status: SHIPPED | OUTPATIENT
Start: 2022-08-08 | End: 2022-12-14 | Stop reason: SDUPTHER

## 2022-09-12 DIAGNOSIS — R73.03 PREDIABETES: ICD-10-CM

## 2022-09-12 DIAGNOSIS — I10 ESSENTIAL (PRIMARY) HYPERTENSION: ICD-10-CM

## 2022-09-12 RX ORDER — LOSARTAN POTASSIUM 100 MG/1
100 TABLET ORAL DAILY
Qty: 90 TABLET | Refills: 1 | Status: SHIPPED | OUTPATIENT
Start: 2022-09-12 | End: 2022-12-14 | Stop reason: SDUPTHER

## 2022-09-12 RX ORDER — CHLORTHALIDONE 25 MG/1
25 TABLET ORAL DAILY
Qty: 90 TABLET | Refills: 1 | Status: SHIPPED | OUTPATIENT
Start: 2022-09-12 | End: 2022-12-14 | Stop reason: SDUPTHER

## 2022-09-12 NOTE — TELEPHONE ENCOUNTER
Caller: Kim Owen    Relationship: Self    Best call back number: [phone]    Requested Prescriptions:   Requested Prescriptions     Pending Prescriptions Disp Refills   • metFORMIN (GLUCOPHAGE) 500 MG tablet 180 tablet 1     Sig: Take 1 tablet by mouth 2 (Two) Times a Day.   • losartan (COZAAR) 100 MG tablet 90 tablet 1     Sig: Take 1 tablet by mouth Daily.   • chlorthalidone (HYGROTON) 25 MG tablet 90 tablet 1     Sig: Take 1 tablet by mouth Daily.        Pharmacy where request should be sent: [address] [phone] [facility] [phone]      Additional details provided by patient:        Does the patient have less than a 3 day supply:  [] Yes  [x] No    Elidia Trent Rep   09/12/22 10:39 EDT

## 2022-10-05 RX ORDER — MELOXICAM 15 MG/1
TABLET ORAL
Qty: 90 TABLET | Refills: 1 | Status: SHIPPED | OUTPATIENT
Start: 2022-10-05 | End: 2022-12-14 | Stop reason: SDUPTHER

## 2022-10-27 ENCOUNTER — TELEPHONE (OUTPATIENT)
Dept: FAMILY MEDICINE CLINIC | Age: 72
End: 2022-10-27

## 2022-10-27 DIAGNOSIS — E78.2 MIXED HYPERLIPIDEMIA: ICD-10-CM

## 2022-10-27 DIAGNOSIS — E03.9 HYPOTHYROIDISM, UNSPECIFIED TYPE: ICD-10-CM

## 2022-10-27 RX ORDER — LEVOTHYROXINE SODIUM 0.03 MG/1
TABLET ORAL
Qty: 90 TABLET | Refills: 0 | Status: SHIPPED | OUTPATIENT
Start: 2022-10-27 | End: 2022-12-14 | Stop reason: SDUPTHER

## 2022-10-27 NOTE — TELEPHONE ENCOUNTER
Caller: Kim Owen    Relationship: Self    Best call back number: [phone]    Requested Prescriptions:   Requested Prescriptions     Pending Prescriptions Disp Refills   • atorvastatin (LIPITOR) 10 MG tablet 90 tablet 1     Sig: Take 1 tablet by mouth every night at bedtime.        Pharmacy where request should be sent: [facility] [address] [place] [address] [phone] [facility] [phone] FX       Does the patient have less than a 3 day supply:  [] Yes  [x] No    Elidia Chambers Rep   10/27/22 09:39 EDT

## 2022-10-28 RX ORDER — ATORVASTATIN CALCIUM 10 MG/1
10 TABLET, FILM COATED ORAL
Qty: 90 TABLET | Refills: 1 | Status: SHIPPED | OUTPATIENT
Start: 2022-10-28 | End: 2022-12-14 | Stop reason: SDUPTHER

## 2022-11-08 ENCOUNTER — TRANSCRIBE ORDERS (OUTPATIENT)
Dept: ADMINISTRATIVE | Facility: HOSPITAL | Age: 72
End: 2022-11-08

## 2022-11-08 DIAGNOSIS — Z12.31 SCREENING MAMMOGRAM FOR BREAST CANCER: Primary | ICD-10-CM

## 2022-12-14 ENCOUNTER — LAB (OUTPATIENT)
Dept: LAB | Facility: HOSPITAL | Age: 72
End: 2022-12-14

## 2022-12-14 ENCOUNTER — HOSPITAL ENCOUNTER (OUTPATIENT)
Dept: GENERAL RADIOLOGY | Facility: HOSPITAL | Age: 72
Discharge: HOME | End: 2022-12-14

## 2022-12-14 ENCOUNTER — OFFICE VISIT (OUTPATIENT)
Dept: FAMILY MEDICINE CLINIC | Age: 72
End: 2022-12-14

## 2022-12-14 VITALS
HEIGHT: 60 IN | HEART RATE: 79 BPM | WEIGHT: 232.6 LBS | TEMPERATURE: 98.8 F | SYSTOLIC BLOOD PRESSURE: 123 MMHG | BODY MASS INDEX: 45.67 KG/M2 | DIASTOLIC BLOOD PRESSURE: 54 MMHG

## 2022-12-14 DIAGNOSIS — E11.9 TYPE 2 DIABETES MELLITUS WITHOUT COMPLICATION, WITHOUT LONG-TERM CURRENT USE OF INSULIN: ICD-10-CM

## 2022-12-14 DIAGNOSIS — I10 ESSENTIAL (PRIMARY) HYPERTENSION: ICD-10-CM

## 2022-12-14 DIAGNOSIS — M25.561 PAIN IN BOTH KNEES, UNSPECIFIED CHRONICITY: ICD-10-CM

## 2022-12-14 DIAGNOSIS — F33.1 MAJOR DEPRESSIVE DISORDER, RECURRENT, MODERATE: ICD-10-CM

## 2022-12-14 DIAGNOSIS — E03.9 HYPOTHYROIDISM, UNSPECIFIED TYPE: ICD-10-CM

## 2022-12-14 DIAGNOSIS — M15.0 PRIMARY GENERALIZED (OSTEO)ARTHRITIS: ICD-10-CM

## 2022-12-14 DIAGNOSIS — E78.2 MIXED HYPERLIPIDEMIA: ICD-10-CM

## 2022-12-14 DIAGNOSIS — Z23 ENCOUNTER FOR IMMUNIZATION: ICD-10-CM

## 2022-12-14 DIAGNOSIS — Z00.00 MEDICARE ANNUAL WELLNESS VISIT, SUBSEQUENT: Primary | ICD-10-CM

## 2022-12-14 DIAGNOSIS — M25.562 PAIN IN BOTH KNEES, UNSPECIFIED CHRONICITY: ICD-10-CM

## 2022-12-14 LAB
ALBUMIN SERPL-MCNC: 3.9 G/DL (ref 3.5–5.2)
ALBUMIN/GLOB SERPL: 1.3 G/DL
ALP SERPL-CCNC: 65 U/L (ref 39–117)
ALT SERPL W P-5'-P-CCNC: 23 U/L (ref 1–33)
ANION GAP SERPL CALCULATED.3IONS-SCNC: 10.3 MMOL/L (ref 5–15)
AST SERPL-CCNC: 21 U/L (ref 1–32)
BILIRUB SERPL-MCNC: 0.5 MG/DL (ref 0–1.2)
BUN SERPL-MCNC: 16 MG/DL (ref 8–23)
BUN/CREAT SERPL: 15.2 (ref 7–25)
CALCIUM SPEC-SCNC: 9.6 MG/DL (ref 8.6–10.5)
CHLORIDE SERPL-SCNC: 98 MMOL/L (ref 98–107)
CO2 SERPL-SCNC: 30.7 MMOL/L (ref 22–29)
CREAT SERPL-MCNC: 1.05 MG/DL (ref 0.57–1)
EGFRCR SERPLBLD CKD-EPI 2021: 56.9 ML/MIN/1.73
GLOBULIN UR ELPH-MCNC: 3.1 GM/DL
GLUCOSE SERPL-MCNC: 144 MG/DL (ref 65–99)
HBA1C MFR BLD: 6.8 % (ref 4.8–5.6)
POTASSIUM SERPL-SCNC: 3.5 MMOL/L (ref 3.5–5.2)
PROT SERPL-MCNC: 7 G/DL (ref 6–8.5)
SODIUM SERPL-SCNC: 139 MMOL/L (ref 136–145)
TSH SERPL DL<=0.05 MIU/L-ACNC: 2.4 UIU/ML (ref 0.27–4.2)

## 2022-12-14 PROCEDURE — 1170F FXNL STATUS ASSESSED: CPT | Performed by: NURSE PRACTITIONER

## 2022-12-14 PROCEDURE — 1159F MED LIST DOCD IN RCRD: CPT | Performed by: NURSE PRACTITIONER

## 2022-12-14 PROCEDURE — 83036 HEMOGLOBIN GLYCOSYLATED A1C: CPT

## 2022-12-14 PROCEDURE — G0439 PPPS, SUBSEQ VISIT: HCPCS | Performed by: NURSE PRACTITIONER

## 2022-12-14 PROCEDURE — 73560 X-RAY EXAM OF KNEE 1 OR 2: CPT

## 2022-12-14 PROCEDURE — 99214 OFFICE O/P EST MOD 30 MIN: CPT | Performed by: NURSE PRACTITIONER

## 2022-12-14 PROCEDURE — 0124A PR ADM SARSCOV2 30MCG/0.3ML BST: CPT | Performed by: NURSE PRACTITIONER

## 2022-12-14 PROCEDURE — 91312 COVID-19 (PFIZER) BIVALENT BOOSTER 12+YRS: CPT | Performed by: NURSE PRACTITIONER

## 2022-12-14 PROCEDURE — 36415 COLL VENOUS BLD VENIPUNCTURE: CPT

## 2022-12-14 PROCEDURE — 84443 ASSAY THYROID STIM HORMONE: CPT

## 2022-12-14 PROCEDURE — 80053 COMPREHEN METABOLIC PANEL: CPT

## 2022-12-14 RX ORDER — LOSARTAN POTASSIUM 100 MG/1
100 TABLET ORAL DAILY
Qty: 90 TABLET | Refills: 1 | Status: SHIPPED | OUTPATIENT
Start: 2022-12-14 | End: 2023-03-31 | Stop reason: SDUPTHER

## 2022-12-14 RX ORDER — METFORMIN HYDROCHLORIDE 500 MG/1
500 TABLET, EXTENDED RELEASE ORAL 2 TIMES DAILY
Qty: 180 TABLET | Refills: 1 | Status: SHIPPED | OUTPATIENT
Start: 2022-12-14 | End: 2023-06-12

## 2022-12-14 RX ORDER — CHLORTHALIDONE 25 MG/1
25 TABLET ORAL DAILY
Qty: 90 TABLET | Refills: 1 | Status: SHIPPED | OUTPATIENT
Start: 2022-12-14

## 2022-12-14 RX ORDER — LEVOTHYROXINE SODIUM 0.03 MG/1
25 TABLET ORAL DAILY
Qty: 90 TABLET | Refills: 1 | Status: SHIPPED | OUTPATIENT
Start: 2022-12-14

## 2022-12-14 RX ORDER — ATORVASTATIN CALCIUM 10 MG/1
10 TABLET, FILM COATED ORAL
Qty: 90 TABLET | Refills: 1 | Status: SHIPPED | OUTPATIENT
Start: 2022-12-14

## 2022-12-14 RX ORDER — METOPROLOL SUCCINATE 25 MG/1
25 TABLET, EXTENDED RELEASE ORAL DAILY
Qty: 90 TABLET | Refills: 1 | Status: SHIPPED | OUTPATIENT
Start: 2022-12-14

## 2022-12-14 RX ORDER — MELOXICAM 15 MG/1
15 TABLET ORAL DAILY PRN
Qty: 90 TABLET | Refills: 1 | Status: SHIPPED | OUTPATIENT
Start: 2022-12-14 | End: 2023-01-11 | Stop reason: HOSPADM

## 2022-12-14 RX ORDER — BUPROPION HYDROCHLORIDE 100 MG/1
100 TABLET ORAL DAILY
Qty: 90 TABLET | Refills: 1 | Status: SHIPPED | OUTPATIENT
Start: 2022-12-14

## 2022-12-14 NOTE — PROGRESS NOTES
The ABCs of the Annual Wellness Visit  Subsequent Medicare Wellness Visit    Subjective    Kim Owen is a 72 y.o. female who presents for a Subsequent Medicare Wellness Visit.    The following portions of the patient's history were reviewed and   updated as appropriate: allergies, current medications, past family history, past medical history, past social history, past surgical history and problem list.    Compared to one year ago, the patient feels her physical   health is the same.    Compared to one year ago, the patient feels her mental   health is better.    Recent Hospitalizations:  She was not admitted to the hospital during the last year.       Current Medical Providers:  Patient Care Team:  Lindy Chavarria APRN as PCP - General (Nurse Practitioner)  Larry Akhtar MD as Consulting Physician (Neurosurgery)    Outpatient Medications Prior to Visit   Medication Sig Dispense Refill   • ibuprofen (ADVIL,MOTRIN) 800 MG tablet Take 1 tablet by mouth As Needed for Moderate Pain . (Patient taking differently: Take 800 mg by mouth Every Other Day.) 90 tablet 1   • atorvastatin (LIPITOR) 10 MG tablet Take 1 tablet by mouth every night at bedtime. 90 tablet 1   • buPROPion (WELLBUTRIN) 100 MG tablet Take 1 tablet by mouth Daily. 90 tablet 1   • chlorthalidone (HYGROTON) 25 MG tablet Take 1 tablet by mouth Daily. 90 tablet 1   • levothyroxine (SYNTHROID, LEVOTHROID) 25 MCG tablet Take 1 tablet by mouth once daily 90 tablet 0   • losartan (COZAAR) 100 MG tablet Take 1 tablet by mouth Daily. 90 tablet 1   • meloxicam (MOBIC) 15 MG tablet TAKE 1 TABLET BY MOUTH ONCE DAILY AS NEEDED 90 tablet 1   • metFORMIN (GLUCOPHAGE) 500 MG tablet Take 1 tablet by mouth 2 (Two) Times a Day. 180 tablet 1   • metoprolol succinate XL (TOPROL-XL) 25 MG 24 hr tablet Take 1 tablet by mouth once daily 90 tablet 0     No facility-administered medications prior to visit.       No opioid medication identified on active medication list. I  "have reviewed chart for other potential  high risk medication/s and harmful drug interactions in the elderly.          Aspirin is not on active medication list.  Aspirin use is contraindicated for this patient due to: current NSAID therapy.  .    Patient Active Problem List   Diagnosis   • Essential (primary) hypertension   • Hyperlipidemia, unspecified   • Hypothyroidism, unspecified   • Other specified disorders of bone density and structure, unspecified site   • Vitamin D deficiency, unspecified   • Major depressive disorder, recurrent, moderate (HCC)   • Primary generalized (osteo)arthritis   • Spinal stenosis, lumbar region without neurogenic claudication   • Chronic pain   • Low back pain   • Other long term (current) drug therapy   • Spondylosis without myelopathy   • Renal insufficiency   • Degeneration of lumbar intervertebral disc   • Anxiety   • S/P lumbar fusion   • Type 2 diabetes mellitus without complication, without long-term current use of insulin ([facility])     Advance Care Planning  Advance Directive is on file.  ACP discussion was declined by the patient. Patient has an advance directive in EMR which is still valid.      Objective    Vitals:    12/14/22 0807   BP: 123/54   BP Location: Left arm   Patient Position: Sitting   Pulse: 79   Temp: 98.8 °F (37.1 °C)   TempSrc: Oral   Weight: 106 kg (232 lb 9.6 oz)   Height: 152.4 cm (60\")     Estimated body mass index is 45.43 kg/m² as calculated from the following:    Height as of this encounter: 152.4 cm (60\").    Weight as of this encounter: 106 kg (232 lb 9.6 oz).    Class 3 Severe Obesity (BMI >=40). Obesity-related health conditions include the following: hypertension and diabetes mellitus. Obesity is unchanged. BMI is is above average; BMI management plan is completed. We discussed portion control and increasing exercise.      Does the patient have evidence of cognitive impairment? No          HEALTH RISK ASSESSMENT    Smoking Status:  Social History "     Tobacco Use   Smoking Status Never   Smokeless Tobacco Never     Alcohol Consumption:  Social History     Substance and Sexual Activity   Alcohol Use Not Currently     Fall Risk Screen:    STEADI Fall Risk Assessment was completed, and patient is at HIGH risk for falls. Assessment completed on:12/14/2022    Depression Screening:  PHQ-2/PHQ-9 Depression Screening 12/14/2022   Retired PHQ-9 Total Score -   Retired Total Score -   Little Interest or Pleasure in Doing Things 0-->not at all   Feeling Down, Depressed or Hopeless 0-->not at all   PHQ-9: Brief Depression Severity Measure Score 0       Health Habits and Functional and Cognitive Screening:  Functional & Cognitive Status 12/14/2022   Do you have difficulty preparing food and eating? No   Do you have difficulty bathing yourself, getting dressed or grooming yourself? No   Do you have difficulty using the toilet? No   Do you have difficulty moving around from place to place? No   Do you have trouble with steps or getting out of a bed or a chair? Yes   Current Diet Well Balanced Diet   Dental Exam Up to date        Dental Exam Comment -   Eye Exam Up to date        Eye Exam Comment -   Exercise (times per week) 0 times per week   Current Exercises Include No Regular Exercise   Do you need help using the phone?  No   Are you deaf or do you have serious difficulty hearing?  No   Do you need help with transportation? No   Do you need help shopping? No   Do you need help preparing meals?  No   Do you need help with housework?  No   Do you need help with laundry? No   Do you need help taking your medications? No   Do you need help managing money? No   Do you ever drive or ride in a car without wearing a seat belt? No   Have you felt unusual stress, anger or loneliness in the last month? No   Who do you live with? Spouse   If you need help, do you have trouble finding someone available to you? No   Have you been bothered in the last four weeks by sexual problems? No    Do you have difficulty concentrating, remembering or making decisions? No       Age-appropriate Screening Schedule:  Refer to the list below for future screening recommendations based on patient's age, sex and/or medical conditions. Orders for these recommended tests are listed in the plan section. The patient has been provided with a written plan.    Health Maintenance   Topic Date Due   • MAMMOGRAM  12/07/2022   • ZOSTER VACCINE (1 of 2) 12/14/2022 (Originally 7/23/2001)   • TDAP/TD VACCINES (1 - Tdap) 12/14/2023 (Originally [birth year]/1970)   • LIPID PANEL  06/15/2023   • DXA SCAN  03/24/2024   • INFLUENZA VACCINE  Completed   • DIABETIC FOOT EXAM  Discontinued   • HEMOGLOBIN A1C  Discontinued   • DIABETIC EYE EXAM  Discontinued   • URINE MICROALBUMIN  Discontinued                CMS Preventative Services Quick Reference  Risk Factors Identified During Encounter  Fall Risk-High or Moderate: Information on Fall Prevention Shared in After Visit Summary  Immunizations Discussed/Encouraged: COVID19  Inactivity/Sedentary: Patient was advised to exercise at least 150 minutes a week per CDC recommendations.  Vision Screening Recommended  The above risks/problems have been discussed with the patient.  Pertinent information has been shared with the patient in the After Visit Summary.  An After Visit Summary and PPPS were made available to the patient.    Follow Up:   Next Medicare Wellness visit to be scheduled in 1 year.       Additional E&M Note during same encounter follows:  Patient has multiple medical problems which are significant and separately identifiable that require additional work above and beyond the Medicare Wellness Visit.      Chief Complaint  Medicare Wellness-subsequent    Subjective        HPI  Kim Owen is also being seen today for HTN, DM, hypothyroidism, OA.  She is on losartan 100 mg daily, chlorthalidone 25 mg daily, Toprol XL 25 mg daily for hypertension..   On atorvastatin for  "hyperlipidemia.   Taking Metformin. Last A1C at 6.6 consistent with diabetes where she was previously prediabetic. She declines dietician/diabetes educator. She does note some diarrhea since taking the metformin more regularly.  On levothyroxine for hypothyroidism.  Osteopenia on bone density scan. Was previously taking calcium but was taken off by previous provider. She reports getting plenty of calcium in diet. Vitamin D3 56749 daily. Also takes MVI.   Taking Wellbutrin for depression. Notes doing well.  She has had lumbar fusion L4-L5 last year. Dr Akhtar did her surgery.   C/o bilateral knee pain. Hx right knee surgery back in 2018. Surgery performed by Dr Ruano. She notes that her left knee has been weak making her feel like she is going to fall.  She has been to physical therapy in the past for her knees. She is wondering if she is going to need another surgery.    Review of Systems   Constitutional: Negative for chills and fever.   HENT: Negative for ear pain and sore throat.    Eyes: Negative for photophobia and visual disturbance.   Respiratory: Negative for cough and shortness of breath.    Cardiovascular: Negative for chest pain and palpitations.   Gastrointestinal: Positive for diarrhea. Negative for blood in stool and rectal pain.   Musculoskeletal:        Bilateral knee pain     Skin: Negative for rash.   Neurological: Negative for seizures and syncope.   Psychiatric/Behavioral: Negative for hallucinations and suicidal ideas.       Objective   Vital Signs:  /54 (BP Location: Left arm, Patient Position: Sitting)   Pulse 79   Temp 98.8 °F (37.1 °C) (Oral)   Ht 152.4 cm (60\")   Wt 106 kg (232 lb 9.6 oz)   BMI 45.43 kg/m²     Physical Exam  Vitals reviewed.   Constitutional:       General: She is not in acute distress.     Appearance: Normal appearance. She is well-developed.   HENT:      Head: Normocephalic and atraumatic.      Right Ear: Tympanic membrane and ear canal normal.      Left Ear: " Tympanic membrane and ear canal normal.   Cardiovascular:      Rate and Rhythm: Normal rate and regular rhythm.   Pulmonary:      Effort: Pulmonary effort is normal.      Breath sounds: Normal breath sounds.   Abdominal:      General: Bowel sounds are normal.      Palpations: Abdomen is soft.   Neurological:      Mental Status: She is alert and oriented to person, place, and time.   Psychiatric:         Mood and Affect: Mood and affect normal.                         Assessment and Plan   Diagnoses and all orders for this visit:    1. Medicare annual wellness visit, subsequent (Primary)  Comments:  Appropriate screenings and vaccinations were reviewed with the pt and offered as indicated.  Pt counseled on healthy lifestyle including healthy diet, exercise.    2. Pain in both knees, unspecified chronicity  Comments:  Xray, continue NSAIDs, ortho referral.  Orders:  -     XR Knee 1 or 2 View Bilateral; Future  -     Ambulatory Referral to Orthopedic Surgery    3. Type 2 diabetes mellitus without complication, without long-term current use of insulin (HCC)  Comments:  Will switch to XR version of metformin to see if that helps with diarrhea. Continue diabetic diet.   Orders:  -     metFORMIN ER (GLUCOPHAGE-XR) 500 MG 24 hr tablet; Take 1 tablet by mouth 2 (Two) Times a Day for 180 days.  Dispense: 180 tablet; Refill: 1  -     Comprehensive metabolic panel; Future  -     Hemoglobin A1c; Future    4. Essential (primary) hypertension  Comments:  Medical condition is stable.  Continue same therapy.  Will recheck at next regular appointment.  Orders:  -     losartan (COZAAR) 100 MG tablet; Take 1 tablet by mouth Daily.  Dispense: 90 tablet; Refill: 1  -     metoprolol succinate XL (TOPROL-XL) 25 MG 24 hr tablet; Take 1 tablet by mouth Daily.  Dispense: 90 tablet; Refill: 1  -     chlorthalidone (HYGROTON) 25 MG tablet; Take 1 tablet by mouth Daily.  Dispense: 90 tablet; Refill: 1    5. Hypothyroidism, unspecified  type  Comments:  Medical condition is stable.  Continue same therapy.  Will recheck at next regular appointment.  Orders:  -     levothyroxine (SYNTHROID, LEVOTHROID) 25 MCG tablet; Take 1 tablet by mouth Daily.  Dispense: 90 tablet; Refill: 1  -     TSH; Future    6. Mixed hyperlipidemia  Comments:  Medical condition is stable.  Continue same therapy.  Will recheck at next regular appointment.  Orders:  -     atorvastatin (LIPITOR) 10 MG tablet; Take 1 tablet by mouth every night at bedtime.  Dispense: 90 tablet; Refill: 1    7. Major depressive disorder, recurrent, moderate (HCC)  Comments:  Medical condition is stable.  Continue same therapy.  Will recheck at next regular appointment.  Orders:  -     buPROPion (WELLBUTRIN) 100 MG tablet; Take 1 tablet by mouth Daily.  Dispense: 90 tablet; Refill: 1    8. Primary generalized (osteo)arthritis  -     meloxicam (MOBIC) 15 MG tablet; Take 1 tablet by mouth Daily As Needed for Moderate Pain.  Dispense: 90 tablet; Refill: 1    9. Encounter for immunization  -     COVID-19 Bivalent Booster (Pfizer) 12+yrs               Follow Up   Return in about 6 months (around 6/14/2023) for Recheck.  Patient was given instructions and counseling regarding her condition or for health maintenance advice. Please see specific information pulled into the AVS if appropriate.

## 2022-12-19 ENCOUNTER — OFFICE VISIT (OUTPATIENT)
Dept: ORTHOPEDIC SURGERY | Facility: CLINIC | Age: 72
End: 2022-12-19

## 2022-12-19 ENCOUNTER — PREP FOR SURGERY (OUTPATIENT)
Dept: OTHER | Facility: HOSPITAL | Age: 72
End: 2022-12-19

## 2022-12-19 VITALS — WEIGHT: 230 LBS | OXYGEN SATURATION: 97 % | HEART RATE: 74 BPM | BODY MASS INDEX: 45.16 KG/M2 | HEIGHT: 60 IN

## 2022-12-19 DIAGNOSIS — M17.12 OSTEOARTHRITIS OF LEFT KNEE, UNSPECIFIED OSTEOARTHRITIS TYPE: Primary | ICD-10-CM

## 2022-12-19 DIAGNOSIS — Z47.1 AFTERCARE FOLLOWING LEFT KNEE JOINT REPLACEMENT SURGERY: Primary | ICD-10-CM

## 2022-12-19 DIAGNOSIS — M17.12 UNILATERAL PRIMARY OSTEOARTHRITIS, LEFT KNEE: Primary | ICD-10-CM

## 2022-12-19 DIAGNOSIS — Z96.652 AFTERCARE FOLLOWING LEFT KNEE JOINT REPLACEMENT SURGERY: Primary | ICD-10-CM

## 2022-12-19 PROCEDURE — 99204 OFFICE O/P NEW MOD 45 MIN: CPT | Performed by: ORTHOPAEDIC SURGERY

## 2022-12-19 RX ORDER — CEFAZOLIN SODIUM IN 0.9 % NACL 3 G/100 ML
3 INTRAVENOUS SOLUTION, PIGGYBACK (ML) INTRAVENOUS ONCE
Status: CANCELLED | OUTPATIENT
Start: 2022-12-19 | End: 2022-12-19

## 2022-12-19 RX ORDER — CEFAZOLIN SODIUM 2 G/100ML
2 INJECTION, SOLUTION INTRAVENOUS ONCE
Status: CANCELLED | OUTPATIENT
Start: 2022-12-19 | End: 2022-12-19

## 2022-12-19 RX ORDER — POVIDONE-IODINE 10 MG/ML
SOLUTION TOPICAL ONCE
Status: CANCELLED | OUTPATIENT
Start: 2022-12-19 | End: 2022-12-19

## 2022-12-19 RX ORDER — TRANEXAMIC ACID 10 MG/ML
1000 INJECTION, SOLUTION INTRAVENOUS ONCE
Status: CANCELLED | OUTPATIENT
Start: 2022-12-19 | End: 2022-12-19

## 2022-12-19 NOTE — PROGRESS NOTES
"Chief Complaint  Initial Evaluation of the Right Knee and Initial Evaluation of the Left Knee     Subjective      Kim Owen presents to [facility] for initial evaluation of bilateral knees.   She had a right total knee arthroplasty in 2017.  She has had increase pain in the left knee.  She has difficulty with long distant ambulation and standing tasks. She has had no new injury or fall.  She is here to discuss surgery options for the left knee.     No Known Allergies     Social History     Socioeconomic History   • Marital status:    Tobacco Use   • Smoking status: Never   • Smokeless tobacco: Never   Vaping Use   • Vaping Use: Never used   Substance and Sexual Activity   • Alcohol use: Not Currently   • Drug use: Never        Review of Systems     Objective   Vital Signs:   Pulse 74   Ht 152.4 cm (60\")   Wt 104 kg (230 lb)   SpO2 97%   BMI 44.92 kg/m²       Physical Exam  Constitutional:       Appearance: Normal appearance. Patient is well-developed and normal weight.   HENT:      Head: Normocephalic.      Right Ear: Hearing and external ear normal.      Left Ear: Hearing and external ear normal.      Nose: Nose normal.   Eyes:      Conjunctiva/sclera: Conjunctivae normal.   Cardiovascular:      Rate and Rhythm: Normal rate.   Pulmonary:      Effort: Pulmonary effort is normal.      Breath sounds: No wheezing or rales.   Abdominal:      Palpations: Abdomen is soft.      Tenderness: There is no abdominal tenderness.   Musculoskeletal:      Cervical back: Normal range of motion.   Skin:     Findings: No rash.   Neurological:      Mental Status: Patient  is alert and oriented to person, place, and time.   Psychiatric:         Mood and Affect: Mood and affect normal.         Judgment: Judgment normal.       Ortho Exam      BILATERAL KNEES Good tone of hip flexors, hip extensors, and hip abductors. Dorsal pedal pulse 2+. Posterior tibialis pulse is 2+. Sensation intact. " Neurovascular Intact. Slight swelling. Sensation intact. Neurovascular Intact.       Procedures        Imaging Results (Most Recent)     None           Result Review :     X-Ray Report:  Right knee X-Ray  Indication: Evaluation of the right knee.   AP/Lateral and Martell view(s)  Findings: No signs of loosening, subsidence or periprosthetic fracture.   Prior studies available for comparison:Yes    X-Ray Report:  Left knee X-Ray  Indication: Evaluation of the left knee.   AP/Lateral and Martell view(s)  Findings: Severe arthritis.  Mild osseous abnormality, no dislocation or fracture.   Prior studies available for comparison: No              Assessment and Plan     Diagnoses and all orders for this visit:    1. Osteoarthritis of left knee, unspecified osteoarthritis type (Primary)        Discussed the treatment plan with the patient. Discussed conservative measures as exercises, anti-inflammatory and injection. Discussed the treatment options with the patient, operative vs non-operative. She understands the risks and benefits and ready to proceed with a left total knee arthroplasty.     Educated on risk of elevated BMI related to procedure.  Discussed options for weight loss/decreasing BMI prior to procedure including dietician consult, weight loss options and exercise program., Discussed surgery., Risks/benefits discussed with patient including, but not limited to: infection, bleeding, neurovascular damage, malunion, nonunion, aesthetic deformity, need for further surgery, and death., Discussed with patient the implant type being used during surgery and patient understands and desires to proceed., Surgery pamphlet given. and Call or return if worsening symptoms.    Follow Up     Postoperatively     Patient was given instructions and counseling regarding her condition or for health maintenance advice. Please see specific information pulled into the AVS if appropriate.     Scribed for Lexa Ruano MD by Mary  CARISSA Moffett.  12/19/22   14:39 EST    I have personally performed the services described in this document as scribed by the above individual and it is both accurate and complete. Lexa Ruano MD 12/19/22

## 2023-01-04 ENCOUNTER — PRE-ADMISSION TESTING (OUTPATIENT)
Dept: PREADMISSION TESTING | Facility: HOSPITAL | Age: 73
End: 2023-01-04
Payer: MEDICARE

## 2023-01-04 VITALS
TEMPERATURE: 97.7 F | OXYGEN SATURATION: 94 % | RESPIRATION RATE: 16 BRPM | WEIGHT: 231.7 LBS | HEIGHT: 60 IN | BODY MASS INDEX: 45.49 KG/M2 | SYSTOLIC BLOOD PRESSURE: 130 MMHG | HEART RATE: 76 BPM | DIASTOLIC BLOOD PRESSURE: 68 MMHG

## 2023-01-04 DIAGNOSIS — M17.12 UNILATERAL PRIMARY OSTEOARTHRITIS, LEFT KNEE: ICD-10-CM

## 2023-01-04 LAB
ALBUMIN SERPL-MCNC: 4.2 G/DL (ref 3.5–5.2)
ALBUMIN/GLOB SERPL: 1.3 G/DL
ALP SERPL-CCNC: 55 U/L (ref 39–117)
ALT SERPL W P-5'-P-CCNC: 22 U/L (ref 1–33)
ANION GAP SERPL CALCULATED.3IONS-SCNC: 12.4 MMOL/L (ref 5–15)
AST SERPL-CCNC: 19 U/L (ref 1–32)
BASOPHILS # BLD AUTO: 0.08 10*3/MM3 (ref 0–0.2)
BASOPHILS NFR BLD AUTO: 1 % (ref 0–1.5)
BILIRUB SERPL-MCNC: 0.5 MG/DL (ref 0–1.2)
BUN SERPL-MCNC: 28 MG/DL (ref 8–23)
BUN/CREAT SERPL: 23.7 (ref 7–25)
CALCIUM SPEC-SCNC: 9.9 MG/DL (ref 8.6–10.5)
CHLORIDE SERPL-SCNC: 98 MMOL/L (ref 98–107)
CO2 SERPL-SCNC: 25.6 MMOL/L (ref 22–29)
CREAT SERPL-MCNC: 1.18 MG/DL (ref 0.57–1)
DEPRECATED RDW RBC AUTO: 41.5 FL (ref 37–54)
EGFRCR SERPLBLD CKD-EPI 2021: 49.5 ML/MIN/1.73
EOSINOPHIL # BLD AUTO: 0.27 10*3/MM3 (ref 0–0.4)
EOSINOPHIL NFR BLD AUTO: 3.2 % (ref 0.3–6.2)
ERYTHROCYTE [DISTWIDTH] IN BLOOD BY AUTOMATED COUNT: 13.4 % (ref 12.3–15.4)
GLOBULIN UR ELPH-MCNC: 3.2 GM/DL
GLUCOSE SERPL-MCNC: 140 MG/DL (ref 65–99)
HBA1C MFR BLD: 6.3 % (ref 4.8–5.6)
HCT VFR BLD AUTO: 36.7 % (ref 34–46.6)
HGB BLD-MCNC: 12.9 G/DL (ref 12–15.9)
IMM GRANULOCYTES # BLD AUTO: 0.03 10*3/MM3 (ref 0–0.05)
IMM GRANULOCYTES NFR BLD AUTO: 0.4 % (ref 0–0.5)
INR PPP: 0.96 (ref 0.86–1.15)
LYMPHOCYTES # BLD AUTO: 2.11 10*3/MM3 (ref 0.7–3.1)
LYMPHOCYTES NFR BLD AUTO: 25.1 % (ref 19.6–45.3)
MCH RBC QN AUTO: 29.9 PG (ref 26.6–33)
MCHC RBC AUTO-ENTMCNC: 35.1 G/DL (ref 31.5–35.7)
MCV RBC AUTO: 85 FL (ref 79–97)
MONOCYTES # BLD AUTO: 0.78 10*3/MM3 (ref 0.1–0.9)
MONOCYTES NFR BLD AUTO: 9.3 % (ref 5–12)
NEUTROPHILS NFR BLD AUTO: 5.13 10*3/MM3 (ref 1.7–7)
NEUTROPHILS NFR BLD AUTO: 61 % (ref 42.7–76)
NRBC BLD AUTO-RTO: 0 /100 WBC (ref 0–0.2)
PLATELET # BLD AUTO: 268 10*3/MM3 (ref 140–450)
PMV BLD AUTO: 9.6 FL (ref 6–12)
POTASSIUM SERPL-SCNC: 3.4 MMOL/L (ref 3.5–5.2)
PROT SERPL-MCNC: 7.4 G/DL (ref 6–8.5)
PROTHROMBIN TIME: 12.9 SECONDS (ref 11.8–14.9)
QT INTERVAL: 386 MS
RBC # BLD AUTO: 4.32 10*6/MM3 (ref 3.77–5.28)
SODIUM SERPL-SCNC: 136 MMOL/L (ref 136–145)
WBC NRBC COR # BLD: 8.4 10*3/MM3 (ref 3.4–10.8)

## 2023-01-04 PROCEDURE — 85610 PROTHROMBIN TIME: CPT

## 2023-01-04 PROCEDURE — 93005 ELECTROCARDIOGRAM TRACING: CPT

## 2023-01-04 PROCEDURE — 80053 COMPREHEN METABOLIC PANEL: CPT

## 2023-01-04 PROCEDURE — 93010 ELECTROCARDIOGRAM REPORT: CPT | Performed by: INTERNAL MEDICINE

## 2023-01-04 PROCEDURE — 36415 COLL VENOUS BLD VENIPUNCTURE: CPT

## 2023-01-04 PROCEDURE — 83036 HEMOGLOBIN GLYCOSYLATED A1C: CPT

## 2023-01-04 PROCEDURE — 85025 COMPLETE CBC W/AUTO DIFF WBC: CPT

## 2023-01-04 NOTE — DISCHARGE INSTRUCTIONS
IMPORTANT INSTRUCTIONS - PRE-ADMISSION TESTING  DO NOT EAT OR CHEW anything after midnight the night before your procedure.    You may have CLEAR liquids up to ___3___ hours prior to ARRIVAL time.   Take the following medications the morning of your procedure with JUST A SIP OF WATER:  _______  LEVOTHYROXINE________________________________________________________________________________________________________________________________________________________________________________    DO NOT BRING your medications to the hospital with you, UNLESS something has changed since your PRE-Admission Testing appointment.  STARTING NOW Hold all vitamins, supplements, and NSAIDS (Non- steroidal anti-inflammatory meds) for one week prior to surgery (you MAY take Tylenol or Acetaminophen).  If you are diabetic, check your blood sugar the morning of your procedure. If it is less than 70 or if you are feeling symptomatic, call the following number for further instructions: [phone]__.  Use your inhalers/nebulizers as usual, the morning of your procedure. BRING YOUR INHALERS with you.   Bring your CPAP or BIPAP to hospital, ONLY IF YOU WILL BE SPENDING THE NIGHT.   Make sure you have a ride home and have someone who will stay with you the day of your procedure after you go home.  If you have any questions, please call your Pre-Admission Testing Nurse, ELIAS_______________ at [phone]____________.   Per anesthesia request, do not smoke for 24 hours before your procedure or as instructed by your surgeon.    WILL CALL ON 1/9/23 AND GIVE OFFICIAL ARRIVAL TIME FOR DAY OF SURGERY  DRINK 20 OZ SUGAR FREE NO RED GATORADE 3 HOURS PRIOR TO ARRIVAL. REFER TO CLEAR LIQUID DIET SHEET FOR APPROVED CLEAR LIQUIDS  ON 1/9/23 NO METFORMIN AFTER 6 PM  REFER TO PAGE 9 IN TOTAL JOINT BOOK FOR BATHING INSTRUCTIONS. NO JEWELRY OF ANY TYPE OR NAIL POLISH UPPER OR LOWER EXT  CASH, CHECK, OR CARD FOR MED TO BED IF INDICATED AT DISCHARGE  COME  TO SAME AREA HAD PAT APPT ELEVATOR A 3RD FLOOR

## 2023-01-05 ENCOUNTER — ANESTHESIA EVENT (OUTPATIENT)
Dept: PERIOP | Facility: HOSPITAL | Age: 73
End: 2023-01-05
Payer: MEDICARE

## 2023-01-10 ENCOUNTER — ANESTHESIA (OUTPATIENT)
Dept: PERIOP | Facility: HOSPITAL | Age: 73
End: 2023-01-10
Payer: MEDICARE

## 2023-01-10 ENCOUNTER — HOSPITAL ENCOUNTER (OUTPATIENT)
Facility: HOSPITAL | Age: 73
Discharge: HOME | End: 2023-01-11
Attending: ORTHOPAEDIC SURGERY | Admitting: ORTHOPAEDIC SURGERY
Payer: MEDICARE

## 2023-01-10 ENCOUNTER — APPOINTMENT (OUTPATIENT)
Dept: GENERAL RADIOLOGY | Facility: HOSPITAL | Age: 73
End: 2023-01-10
Payer: MEDICARE

## 2023-01-10 DIAGNOSIS — R26.2 DIFFICULTY IN WALKING: Primary | ICD-10-CM

## 2023-01-10 DIAGNOSIS — M17.12 OSTEOARTHROSIS, LOCALIZED, PRIMARY, KNEE, LEFT: ICD-10-CM

## 2023-01-10 DIAGNOSIS — M17.12 UNILATERAL PRIMARY OSTEOARTHRITIS, LEFT KNEE: ICD-10-CM

## 2023-01-10 DIAGNOSIS — Z78.9 DECREASED ACTIVITIES OF DAILY LIVING (ADL): ICD-10-CM

## 2023-01-10 LAB
GLUCOSE BLDC GLUCOMTR-MCNC: 151 MG/DL (ref 70–99)
GLUCOSE BLDC GLUCOMTR-MCNC: 180 MG/DL (ref 70–99)

## 2023-01-10 PROCEDURE — 63710000001 ATORVASTATIN 10 MG TABLET: Performed by: INTERNAL MEDICINE

## 2023-01-10 PROCEDURE — 63710000001 GABAPENTIN 300 MG CAPSULE: Performed by: ANESTHESIOLOGY

## 2023-01-10 PROCEDURE — 25010000002 DEXAMETHASONE PER 1 MG: Performed by: NURSE ANESTHETIST, CERTIFIED REGISTERED

## 2023-01-10 PROCEDURE — 63710000001 METOPROLOL SUCCINATE XL 25 MG TABLET SUSTAINED-RELEASE 24 HOUR: Performed by: INTERNAL MEDICINE

## 2023-01-10 PROCEDURE — 25010000002 FENTANYL CITRATE (PF) 50 MCG/ML SOLUTION

## 2023-01-10 PROCEDURE — A9270 NON-COVERED ITEM OR SERVICE: HCPCS | Performed by: INTERNAL MEDICINE

## 2023-01-10 PROCEDURE — A9270 NON-COVERED ITEM OR SERVICE: HCPCS | Performed by: ANESTHESIOLOGY

## 2023-01-10 PROCEDURE — 25010000002 EPINEPHRINE 1 MG/ML SOLUTION: Performed by: ORTHOPAEDIC SURGERY

## 2023-01-10 PROCEDURE — 25010000002 ROPIVACAINE PER 1 MG: Performed by: ORTHOPAEDIC SURGERY

## 2023-01-10 PROCEDURE — 94761 N-INVAS EAR/PLS OXIMETRY MLT: CPT

## 2023-01-10 PROCEDURE — 25010000002 MORPHINE SULFATE 10 MG/ML SOLUTION: Performed by: ORTHOPAEDIC SURGERY

## 2023-01-10 PROCEDURE — C1713 ANCHOR/SCREW BN/BN,TIS/BN: HCPCS | Performed by: ORTHOPAEDIC SURGERY

## 2023-01-10 PROCEDURE — 63710000001 BUPROPION 100 MG TABLET: Performed by: INTERNAL MEDICINE

## 2023-01-10 PROCEDURE — 82962 GLUCOSE BLOOD TEST: CPT

## 2023-01-10 PROCEDURE — 73560 X-RAY EXAM OF KNEE 1 OR 2: CPT

## 2023-01-10 PROCEDURE — 63710000001 ACETAMINOPHEN 500 MG TABLET: Performed by: ANESTHESIOLOGY

## 2023-01-10 PROCEDURE — S0260 H&P FOR SURGERY: HCPCS | Performed by: ORTHOPAEDIC SURGERY

## 2023-01-10 PROCEDURE — 25010000002 FENTANYL CITRATE (PF) 50 MCG/ML SOLUTION: Performed by: NURSE ANESTHETIST, CERTIFIED REGISTERED

## 2023-01-10 PROCEDURE — 20985 CPTR-ASST DIR MS PX: CPT | Performed by: ORTHOPAEDIC SURGERY

## 2023-01-10 PROCEDURE — 25010000002 CEFAZOLIN IN DEXTROSE 2-4 GM/100ML-% SOLUTION: Performed by: ORTHOPAEDIC SURGERY

## 2023-01-10 PROCEDURE — 25010000002 ONDANSETRON PER 1 MG: Performed by: NURSE ANESTHETIST, CERTIFIED REGISTERED

## 2023-01-10 PROCEDURE — 97161 PT EVAL LOW COMPLEX 20 MIN: CPT

## 2023-01-10 PROCEDURE — C1776 JOINT DEVICE (IMPLANTABLE): HCPCS | Performed by: ORTHOPAEDIC SURGERY

## 2023-01-10 PROCEDURE — 25010000002 ONDANSETRON PER 1 MG: Performed by: ORTHOPAEDIC SURGERY

## 2023-01-10 PROCEDURE — 25010000002 KETOROLAC TROMETHAMINE PER 15 MG: Performed by: ORTHOPAEDIC SURGERY

## 2023-01-10 PROCEDURE — 99204 OFFICE O/P NEW MOD 45 MIN: CPT | Performed by: INTERNAL MEDICINE

## 2023-01-10 PROCEDURE — 94799 UNLISTED PULMONARY SVC/PX: CPT

## 2023-01-10 PROCEDURE — 63710000001 OXYCODONE 5 MG TABLET: Performed by: NURSE ANESTHETIST, CERTIFIED REGISTERED

## 2023-01-10 PROCEDURE — 63710000001 CELECOXIB 100 MG CAPSULE: Performed by: ANESTHESIOLOGY

## 2023-01-10 PROCEDURE — 25010000002 PROPOFOL 10 MG/ML EMULSION: Performed by: NURSE ANESTHETIST, CERTIFIED REGISTERED

## 2023-01-10 PROCEDURE — 63710000001 ACETAMINOPHEN 500 MG TABLET: Performed by: ORTHOPAEDIC SURGERY

## 2023-01-10 PROCEDURE — 27447 TOTAL KNEE ARTHROPLASTY: CPT | Performed by: ORTHOPAEDIC SURGERY

## 2023-01-10 PROCEDURE — 25010000002 MIDAZOLAM PER 1 MG: Performed by: ANESTHESIOLOGY

## 2023-01-10 PROCEDURE — A9270 NON-COVERED ITEM OR SERVICE: HCPCS | Performed by: ORTHOPAEDIC SURGERY

## 2023-01-10 PROCEDURE — A9270 NON-COVERED ITEM OR SERVICE: HCPCS | Performed by: NURSE ANESTHETIST, CERTIFIED REGISTERED

## 2023-01-10 DEVICE — STEM TIB/KN PERSONA CMT 5D SZD LT: Type: IMPLANTABLE DEVICE | Site: KNEE | Status: FUNCTIONAL

## 2023-01-10 DEVICE — IMPLANTABLE DEVICE: Type: IMPLANTABLE DEVICE | Site: KNEE | Status: FUNCTIONAL

## 2023-01-10 DEVICE — EXT STEM FEM/KN PERSONA TPR 14XPLS30MM: Type: IMPLANTABLE DEVICE | Site: KNEE | Status: FUNCTIONAL

## 2023-01-10 DEVICE — CAP EXT STEM KN UPCHRG: Type: IMPLANTABLE DEVICE | Site: KNEE | Status: FUNCTIONAL

## 2023-01-10 DEVICE — ART/SRF KN PERSONA/VE PS CD/CR 6TO7 10MM LT: Type: IMPLANTABLE DEVICE | Site: KNEE | Status: FUNCTIONAL

## 2023-01-10 DEVICE — COMP FEM/KN PERSONA CR CMT COCR STD SZ7 LT: Type: IMPLANTABLE DEVICE | Site: KNEE | Status: FUNCTIONAL

## 2023-01-10 DEVICE — CMT BONE PALACOS R HI/VISC 1X40: Type: IMPLANTABLE DEVICE | Site: KNEE | Status: FUNCTIONAL

## 2023-01-10 DEVICE — CAP TOTL KN CMT PRIMARY W/ROSA: Type: IMPLANTABLE DEVICE | Site: KNEE | Status: FUNCTIONAL

## 2023-01-10 RX ORDER — DEXAMETHASONE SODIUM PHOSPHATE 4 MG/ML
INJECTION, SOLUTION INTRA-ARTICULAR; INTRALESIONAL; INTRAMUSCULAR; INTRAVENOUS; SOFT TISSUE AS NEEDED
Status: DISCONTINUED | OUTPATIENT
Start: 2023-01-10 | End: 2023-01-10 | Stop reason: SURG

## 2023-01-10 RX ORDER — HYDROCODONE BITARTRATE AND ACETAMINOPHEN 7.5; 325 MG/1; MG/1
1 TABLET ORAL EVERY 4 HOURS PRN
Status: DISCONTINUED | OUTPATIENT
Start: 2023-01-10 | End: 2023-01-11 | Stop reason: HOSPADM

## 2023-01-10 RX ORDER — PROMETHAZINE HYDROCHLORIDE 25 MG/1
25 SUPPOSITORY RECTAL ONCE AS NEEDED
Status: DISCONTINUED | OUTPATIENT
Start: 2023-01-10 | End: 2023-01-10 | Stop reason: HOSPADM

## 2023-01-10 RX ORDER — GABAPENTIN 300 MG/1
300 CAPSULE ORAL ONCE
Status: COMPLETED | OUTPATIENT
Start: 2023-01-10 | End: 2023-01-10

## 2023-01-10 RX ORDER — LIDOCAINE HYDROCHLORIDE 20 MG/ML
INJECTION, SOLUTION EPIDURAL; INFILTRATION; INTRACAUDAL; PERINEURAL AS NEEDED
Status: DISCONTINUED | OUTPATIENT
Start: 2023-01-10 | End: 2023-01-10 | Stop reason: SURG

## 2023-01-10 RX ORDER — SODIUM CHLORIDE, SODIUM LACTATE, POTASSIUM CHLORIDE, CALCIUM CHLORIDE 600; 310; 30; 20 MG/100ML; MG/100ML; MG/100ML; MG/100ML
80 INJECTION, SOLUTION INTRAVENOUS CONTINUOUS
Status: DISCONTINUED | OUTPATIENT
Start: 2023-01-10 | End: 2023-01-11 | Stop reason: HOSPADM

## 2023-01-10 RX ORDER — AMOXICILLIN 250 MG
2 CAPSULE ORAL 2 TIMES DAILY PRN
Status: DISCONTINUED | OUTPATIENT
Start: 2023-01-10 | End: 2023-01-11 | Stop reason: HOSPADM

## 2023-01-10 RX ORDER — OXYCODONE HYDROCHLORIDE 5 MG/1
5 TABLET ORAL
Status: DISCONTINUED | OUTPATIENT
Start: 2023-01-10 | End: 2023-01-10 | Stop reason: HOSPADM

## 2023-01-10 RX ORDER — ACETAMINOPHEN 500 MG
1000 TABLET ORAL EVERY 6 HOURS
Status: DISCONTINUED | OUTPATIENT
Start: 2023-01-10 | End: 2023-01-11 | Stop reason: HOSPADM

## 2023-01-10 RX ORDER — NALOXONE HCL 0.4 MG/ML
0.4 VIAL (ML) INJECTION
Status: DISCONTINUED | OUTPATIENT
Start: 2023-01-10 | End: 2023-01-11 | Stop reason: HOSPADM

## 2023-01-10 RX ORDER — ONDANSETRON 2 MG/ML
4 INJECTION INTRAMUSCULAR; INTRAVENOUS EVERY 6 HOURS PRN
Status: DISCONTINUED | OUTPATIENT
Start: 2023-01-10 | End: 2023-01-11 | Stop reason: HOSPADM

## 2023-01-10 RX ORDER — ONDANSETRON 2 MG/ML
4 INJECTION INTRAMUSCULAR; INTRAVENOUS ONCE AS NEEDED
Status: DISCONTINUED | OUTPATIENT
Start: 2023-01-10 | End: 2023-01-10 | Stop reason: HOSPADM

## 2023-01-10 RX ORDER — MAGNESIUM HYDROXIDE 1200 MG/15ML
LIQUID ORAL AS NEEDED
Status: DISCONTINUED | OUTPATIENT
Start: 2023-01-10 | End: 2023-01-10 | Stop reason: HOSPADM

## 2023-01-10 RX ORDER — SODIUM CHLORIDE, SODIUM LACTATE, POTASSIUM CHLORIDE, CALCIUM CHLORIDE 600; 310; 30; 20 MG/100ML; MG/100ML; MG/100ML; MG/100ML
9 INJECTION, SOLUTION INTRAVENOUS CONTINUOUS PRN
Status: DISCONTINUED | OUTPATIENT
Start: 2023-01-10 | End: 2023-01-10 | Stop reason: HOSPADM

## 2023-01-10 RX ORDER — DEXMEDETOMIDINE HYDROCHLORIDE 100 UG/ML
INJECTION, SOLUTION INTRAVENOUS AS NEEDED
Status: DISCONTINUED | OUTPATIENT
Start: 2023-01-10 | End: 2023-01-10 | Stop reason: SURG

## 2023-01-10 RX ORDER — FENTANYL CITRATE 50 UG/ML
50 INJECTION, SOLUTION INTRAMUSCULAR; INTRAVENOUS ONCE
Status: COMPLETED | OUTPATIENT
Start: 2023-01-10 | End: 2023-01-10

## 2023-01-10 RX ORDER — ROCURONIUM BROMIDE 10 MG/ML
INJECTION, SOLUTION INTRAVENOUS AS NEEDED
Status: DISCONTINUED | OUTPATIENT
Start: 2023-01-10 | End: 2023-01-10 | Stop reason: SURG

## 2023-01-10 RX ORDER — BISACODYL 10 MG
10 SUPPOSITORY, RECTAL RECTAL DAILY PRN
Status: DISCONTINUED | OUTPATIENT
Start: 2023-01-10 | End: 2023-01-11 | Stop reason: HOSPADM

## 2023-01-10 RX ORDER — CEFAZOLIN SODIUM IN 0.9 % NACL 3 G/100 ML
3 INTRAVENOUS SOLUTION, PIGGYBACK (ML) INTRAVENOUS ONCE
Status: DISCONTINUED | OUTPATIENT
Start: 2023-01-10 | End: 2023-01-10 | Stop reason: SDUPTHER

## 2023-01-10 RX ORDER — BUPIVACAINE HYDROCHLORIDE AND EPINEPHRINE 5; 5 MG/ML; UG/ML
INJECTION, SOLUTION EPIDURAL; INTRACAUDAL; PERINEURAL
Status: COMPLETED | OUTPATIENT
Start: 2023-01-10 | End: 2023-01-10

## 2023-01-10 RX ORDER — HYDROCODONE BITARTRATE AND ACETAMINOPHEN 7.5; 325 MG/1; MG/1
2 TABLET ORAL EVERY 4 HOURS PRN
Status: DISCONTINUED | OUTPATIENT
Start: 2023-01-10 | End: 2023-01-11 | Stop reason: HOSPADM

## 2023-01-10 RX ORDER — METOPROLOL SUCCINATE 25 MG/1
25 TABLET, EXTENDED RELEASE ORAL DAILY
Status: DISCONTINUED | OUTPATIENT
Start: 2023-01-10 | End: 2023-01-11 | Stop reason: HOSPADM

## 2023-01-10 RX ORDER — GLYCOPYRROLATE 0.2 MG/ML
0.2 INJECTION INTRAMUSCULAR; INTRAVENOUS
Status: COMPLETED | OUTPATIENT
Start: 2023-01-10 | End: 2023-01-10

## 2023-01-10 RX ORDER — PROMETHAZINE HYDROCHLORIDE 12.5 MG/1
25 TABLET ORAL ONCE AS NEEDED
Status: DISCONTINUED | OUTPATIENT
Start: 2023-01-10 | End: 2023-01-10 | Stop reason: HOSPADM

## 2023-01-10 RX ORDER — MIDAZOLAM HYDROCHLORIDE 1 MG/ML
4 INJECTION INTRAMUSCULAR; INTRAVENOUS ONCE
Status: COMPLETED | OUTPATIENT
Start: 2023-01-10 | End: 2023-01-10

## 2023-01-10 RX ORDER — CEFAZOLIN SODIUM 2 G/100ML
2 INJECTION, SOLUTION INTRAVENOUS ONCE
Status: COMPLETED | OUTPATIENT
Start: 2023-01-10 | End: 2023-01-10

## 2023-01-10 RX ORDER — ATORVASTATIN CALCIUM 10 MG/1
10 TABLET, FILM COATED ORAL NIGHTLY
Status: DISCONTINUED | OUTPATIENT
Start: 2023-01-10 | End: 2023-01-11 | Stop reason: HOSPADM

## 2023-01-10 RX ORDER — TRANEXAMIC ACID 10 MG/ML
1000 INJECTION, SOLUTION INTRAVENOUS ONCE
Status: COMPLETED | OUTPATIENT
Start: 2023-01-10 | End: 2023-01-10

## 2023-01-10 RX ORDER — KETAMINE HCL IN NACL, ISO-OSM 100MG/10ML
SYRINGE (ML) INJECTION AS NEEDED
Status: DISCONTINUED | OUTPATIENT
Start: 2023-01-10 | End: 2023-01-10 | Stop reason: SURG

## 2023-01-10 RX ORDER — FENTANYL CITRATE 50 UG/ML
INJECTION, SOLUTION INTRAMUSCULAR; INTRAVENOUS
Status: COMPLETED
Start: 2023-01-10 | End: 2023-01-10

## 2023-01-10 RX ORDER — BISACODYL 5 MG/1
10 TABLET, DELAYED RELEASE ORAL DAILY PRN
Status: DISCONTINUED | OUTPATIENT
Start: 2023-01-10 | End: 2023-01-11 | Stop reason: HOSPADM

## 2023-01-10 RX ORDER — POVIDONE-IODINE 10 MG/ML
SOLUTION TOPICAL ONCE
Status: COMPLETED | OUTPATIENT
Start: 2023-01-10 | End: 2023-01-10

## 2023-01-10 RX ORDER — BUPROPION HYDROCHLORIDE 100 MG/1
100 TABLET ORAL DAILY
Status: DISCONTINUED | OUTPATIENT
Start: 2023-01-10 | End: 2023-01-11 | Stop reason: HOSPADM

## 2023-01-10 RX ORDER — ONDANSETRON 2 MG/ML
INJECTION INTRAMUSCULAR; INTRAVENOUS AS NEEDED
Status: DISCONTINUED | OUTPATIENT
Start: 2023-01-10 | End: 2023-01-10 | Stop reason: SURG

## 2023-01-10 RX ORDER — FENTANYL CITRATE 50 UG/ML
INJECTION, SOLUTION INTRAMUSCULAR; INTRAVENOUS AS NEEDED
Status: DISCONTINUED | OUTPATIENT
Start: 2023-01-10 | End: 2023-01-10 | Stop reason: SURG

## 2023-01-10 RX ORDER — LEVOTHYROXINE SODIUM 0.03 MG/1
25 TABLET ORAL EVERY MORNING
Status: DISCONTINUED | OUTPATIENT
Start: 2023-01-11 | End: 2023-01-11

## 2023-01-10 RX ORDER — KETOROLAC TROMETHAMINE 15 MG/ML
15 INJECTION, SOLUTION INTRAMUSCULAR; INTRAVENOUS EVERY 6 HOURS
Status: COMPLETED | OUTPATIENT
Start: 2023-01-10 | End: 2023-01-11

## 2023-01-10 RX ORDER — ONDANSETRON 4 MG/1
4 TABLET, FILM COATED ORAL EVERY 6 HOURS PRN
Status: DISCONTINUED | OUTPATIENT
Start: 2023-01-10 | End: 2023-01-11 | Stop reason: HOSPADM

## 2023-01-10 RX ORDER — PROPOFOL 10 MG/ML
VIAL (ML) INTRAVENOUS AS NEEDED
Status: DISCONTINUED | OUTPATIENT
Start: 2023-01-10 | End: 2023-01-10 | Stop reason: SURG

## 2023-01-10 RX ORDER — CELECOXIB 100 MG/1
200 CAPSULE ORAL ONCE
Status: COMPLETED | OUTPATIENT
Start: 2023-01-10 | End: 2023-01-10

## 2023-01-10 RX ORDER — CEFAZOLIN SODIUM 2 G/100ML
2 INJECTION, SOLUTION INTRAVENOUS EVERY 8 HOURS
Status: COMPLETED | OUTPATIENT
Start: 2023-01-10 | End: 2023-01-11

## 2023-01-10 RX ORDER — ACETAMINOPHEN 500 MG
1000 TABLET ORAL ONCE
Status: COMPLETED | OUTPATIENT
Start: 2023-01-10 | End: 2023-01-10

## 2023-01-10 RX ORDER — MEPERIDINE HYDROCHLORIDE 25 MG/ML
12.5 INJECTION INTRAMUSCULAR; INTRAVENOUS; SUBCUTANEOUS
Status: DISCONTINUED | OUTPATIENT
Start: 2023-01-10 | End: 2023-01-10 | Stop reason: HOSPADM

## 2023-01-10 RX ADMIN — FENTANYL CITRATE 50 MCG: 50 INJECTION, SOLUTION INTRAMUSCULAR; INTRAVENOUS at 10:56

## 2023-01-10 RX ADMIN — DEXMEDETOMIDINE HYDROCHLORIDE 20 MCG: 100 INJECTION, SOLUTION, CONCENTRATE INTRAVENOUS at 11:23

## 2023-01-10 RX ADMIN — ACETAMINOPHEN 1000 MG: 500 TABLET ORAL at 09:37

## 2023-01-10 RX ADMIN — SODIUM CHLORIDE, POTASSIUM CHLORIDE, SODIUM LACTATE AND CALCIUM CHLORIDE 80 ML/HR: 600; 310; 30; 20 INJECTION, SOLUTION INTRAVENOUS at 15:43

## 2023-01-10 RX ADMIN — CEFAZOLIN SODIUM 2 G: 2 INJECTION, SOLUTION INTRAVENOUS at 18:11

## 2023-01-10 RX ADMIN — FENTANYL CITRATE: 50 INJECTION, SOLUTION INTRAMUSCULAR; INTRAVENOUS at 10:20

## 2023-01-10 RX ADMIN — KETOROLAC TROMETHAMINE 15 MG: 15 INJECTION, SOLUTION INTRAMUSCULAR; INTRAVENOUS at 20:32

## 2023-01-10 RX ADMIN — PROPOFOL 50 MG: 10 INJECTION, EMULSION INTRAVENOUS at 10:45

## 2023-01-10 RX ADMIN — CELECOXIB 200 MG: 100 CAPSULE ORAL at 09:37

## 2023-01-10 RX ADMIN — SUGAMMADEX 200 MG: 100 INJECTION, SOLUTION INTRAVENOUS at 11:58

## 2023-01-10 RX ADMIN — POVIDONE-IODINE: 10 SOLUTION TOPICAL at 09:31

## 2023-01-10 RX ADMIN — SODIUM CHLORIDE, POTASSIUM CHLORIDE, SODIUM LACTATE AND CALCIUM CHLORIDE: 600; 310; 30; 20 INJECTION, SOLUTION INTRAVENOUS at 12:01

## 2023-01-10 RX ADMIN — SODIUM CHLORIDE, POTASSIUM CHLORIDE, SODIUM LACTATE AND CALCIUM CHLORIDE 9 ML/HR: 600; 310; 30; 20 INJECTION, SOLUTION INTRAVENOUS at 09:37

## 2023-01-10 RX ADMIN — KETOROLAC TROMETHAMINE 15 MG: 15 INJECTION, SOLUTION INTRAMUSCULAR; INTRAVENOUS at 15:12

## 2023-01-10 RX ADMIN — MIDAZOLAM HYDROCHLORIDE 4 MG: 2 INJECTION, SOLUTION INTRAMUSCULAR; INTRAVENOUS at 09:42

## 2023-01-10 RX ADMIN — Medication 20 MG: at 10:35

## 2023-01-10 RX ADMIN — CEFAZOLIN SODIUM 2 G: 2 INJECTION, SOLUTION INTRAVENOUS at 10:41

## 2023-01-10 RX ADMIN — TRANEXAMIC ACID 1000 MG: 10 INJECTION, SOLUTION INTRAVENOUS at 11:38

## 2023-01-10 RX ADMIN — LIDOCAINE HYDROCHLORIDE 60 MG: 20 INJECTION, SOLUTION EPIDURAL; INFILTRATION; INTRACAUDAL; PERINEURAL at 10:35

## 2023-01-10 RX ADMIN — GLYCOPYRROLATE 0.2 MG: 0.2 INJECTION INTRAMUSCULAR; INTRAVENOUS at 09:42

## 2023-01-10 RX ADMIN — GABAPENTIN 300 MG: 300 CAPSULE ORAL at 09:37

## 2023-01-10 RX ADMIN — SODIUM CHLORIDE, POTASSIUM CHLORIDE, SODIUM LACTATE AND CALCIUM CHLORIDE 80 ML/HR: 600; 310; 30; 20 INJECTION, SOLUTION INTRAVENOUS at 15:12

## 2023-01-10 RX ADMIN — ONDANSETRON 4 MG: 2 INJECTION INTRAMUSCULAR; INTRAVENOUS at 19:21

## 2023-01-10 RX ADMIN — FENTANYL CITRATE 50 MCG: 50 INJECTION, SOLUTION INTRAMUSCULAR; INTRAVENOUS at 10:35

## 2023-01-10 RX ADMIN — METOPROLOL SUCCINATE 25 MG: 25 TABLET, EXTENDED RELEASE ORAL at 18:11

## 2023-01-10 RX ADMIN — ATORVASTATIN CALCIUM 10 MG: 10 TABLET, FILM COATED ORAL at 20:34

## 2023-01-10 RX ADMIN — BUPROPION HYDROCHLORIDE 100 MG: 100 TABLET, FILM COATED ORAL at 18:11

## 2023-01-10 RX ADMIN — BUPIVACAINE HYDROCHLORIDE AND EPINEPHRINE BITARTRATE 30 ML: 5; .005 INJECTION, SOLUTION EPIDURAL; INTRACAUDAL; PERINEURAL at 10:27

## 2023-01-10 RX ADMIN — PROPOFOL 150 MG: 10 INJECTION, EMULSION INTRAVENOUS at 10:35

## 2023-01-10 RX ADMIN — ROCURONIUM BROMIDE 50 MG: 10 INJECTION, SOLUTION INTRAVENOUS at 10:36

## 2023-01-10 RX ADMIN — TRANEXAMIC ACID 1000 MG: 10 INJECTION, SOLUTION INTRAVENOUS at 09:49

## 2023-01-10 RX ADMIN — DEXMEDETOMIDINE HYDROCHLORIDE 30 MCG: 100 INJECTION, SOLUTION, CONCENTRATE INTRAVENOUS at 10:45

## 2023-01-10 RX ADMIN — ACETAMINOPHEN 1000 MG: 500 TABLET ORAL at 15:11

## 2023-01-10 RX ADMIN — OXYCODONE HYDROCHLORIDE 5 MG: 5 TABLET ORAL at 12:57

## 2023-01-10 RX ADMIN — DEXAMETHASONE SODIUM PHOSPHATE 4 MG: 4 INJECTION, SOLUTION INTRA-ARTICULAR; INTRALESIONAL; INTRAMUSCULAR; INTRAVENOUS; SOFT TISSUE at 10:36

## 2023-01-10 RX ADMIN — ONDANSETRON 4 MG: 2 INJECTION INTRAMUSCULAR; INTRAVENOUS at 11:38

## 2023-01-10 NOTE — ANESTHESIA POSTPROCEDURE EVALUATION
Patient: Kim Owen    Procedure Summary     Date: 01/10/23 Room / Location: [facility] OR 03 / [facility] MAIN OR    Anesthesia Start: 1031 Anesthesia Stop: 1204    Procedure: LEFT TOTAL KNEE ARTHROPLASTY WITH JANET ROBOT (Left: Knee) Diagnosis:       Unilateral primary osteoarthritis, left knee      (Unilateral primary osteoarthritis, left knee [M17.12])    Surgeons: Lexa Ruano MD Provider: Reyes, Mirabelle, DO    Anesthesia Type: ERAS Protocol, general with block ASA Status: 4          Anesthesia Type: ERAS Protocol, general with block    Vitals  Vitals Value Taken Time   /56 01/10/23 1302   Temp     Pulse 82 01/10/23 1305   Resp 16 01/10/23 1250   SpO2 93 % 01/10/23 1305   Vitals shown include unvalidated device data.        Post Anesthesia Care and Evaluation    Patient location during evaluation: bedside  Patient participation: complete - patient participated  Level of consciousness: awake  Pain management: adequate    Airway patency: patent  Anesthetic complications: No anesthetic complications  PONV Status: none  Cardiovascular status: acceptable and stable  Respiratory status: acceptable  Hydration status: acceptable    Comments: An Anesthesiologist personally participated in the most demanding procedures (including induction and emergence if applicable) in the anesthesia plan, monitored the course of anesthesia administration at frequent intervals and remained physically present and available for immediate diagnosis and treatment of emergencies.

## 2023-01-10 NOTE — PLAN OF CARE
Goal Outcome Evaluation:  Plan of Care Reviewed With: patient           Outcome Evaluation: Pt. presents with L knee weakness and decreased ROM, significant pain noted following surgery. Outpatient therapy services recommended to improve functional independence.

## 2023-01-10 NOTE — H&P
[facility]  Date: 1/10/2023   Patient Name: Kim Owen  : 1950  MRN: [id number]  Primary Care Physician:  Lindy Chavarria, MINGO  Date of admission: 1/10/2023    Subjective   Subjective     Chief Complaint: Left knee pain    HPI:  Patient is a 72-year-old female past medical history significant for osteoarthritis who presents for scheduled left total knee arthroplasty. Patient states that prior to the procedure the pain had gradually been worsening over the past several years. The patient reports pain and functional impairment including activities of daily living, they have moderate to severe resting pain, chronic inflammation, and swelling. The patient states that this pain is no longer relieved with conservative. The pain is dull and achy in nature, nonradiating, worse with activity. Because of the above the patient is admitted for postsurgical pain control, symptom management and rehab evaluation.  We are consulted for management of the chronical medical issues.      Personal History     Past Medical History:  Past Medical History:   Diagnosis Date   • Anesthesia    • Chronic pain    • Diabetes (HCC)    • Essential (primary) hypertension    • Hyperlipidemia, unspecified    • Hypothyroidism, unspecified    • Low back pain    • Major depressive disorder, recurrent, moderate (HCC)    • Other long term (current) drug therapy    • Other specified disorders of bone density and structure, unspecified site    • Primary generalized (osteo)arthritis    • Spinal stenosis, lumbar region without neurogenic claudication    • Vitamin D deficiency, unspecified    • Zoster without complications        Past Surgical History:  Past Surgical History:   Procedure Laterality Date   • COLONOSCOPY     • HYSTERECTOMY      AGE 48  ENDOMETRIOSIS, MENORRHAGIA   • JOINT REPLACEMENT Right 2016    KNEE   • SPINE SURGERY      L5-S1    DR. DEAN HONEYCUTT     • TOTAL KNEE ARTHROPLASTY Left  1/10/2023    Procedure: LEFT TOTAL KNEE ARTHROPLASTY WITH JANET ROBOT;  Surgeon: Lexa Ruano MD;  Location: [facility];  Service: Orthopedics;  Laterality: Left;       Family History:   family history includes Alcohol abuse in her father; Diabetes type II in her father.    Social History:    reports that she has never smoked. She has never used smokeless tobacco. She reports that she does not currently use alcohol. She reports that she does not use drugs.    Home Medications:  atorvastatin, buPROPion, chlorthalidone, ibuprofen, levothyroxine, losartan, meloxicam, metFORMIN ER, and metoprolol succinate XL    Allergies:  No Known Allergies    Review of Systems:  All systems reviewed and negative other than stated in HPI    Objective   Objective     Vitals:   Temp:  [97.1 °F (36.2 °C)-98 °F (36.7 °C)] 97.3 °F (36.3 °C)  Heart Rate:  [71-86] 78  Resp:  [13-19] 14  BP: (100-139)/(46-75) 105/58  Flow (L/min):  [3-3.5] 3.5    Physical Exam    Constitutional: Awake, alert, no acute distress   Eyes: Pupils equal, sclerae anicteric, no conjunctival injection   HENT: NCAT, mucous membranes moist   Neck: Supple, no thyromegaly, no lymphadenopathy, trachea midline   Respiratory: Clear to auscultation bilaterally, nonlabored respirations    Cardiovascular: RRR, no murmurs, rubs, or gallops   Gastrointestinal: Positive bowel sounds, soft, nontender, nondistended   Musculoskeletal: No bilateral ankle edema, no clubbing or cyanosis to extremities postsurgical change of the left knee noted, dressing clean dry and intact   Psychiatric: Appropriate affect, cooperative   Neurologic: Oriented x 3, strength symmetric in all extremities, Cranial Nerves grossly intact to confrontation, speech clear   Skin: No rashes     Result Review:  I have personally reviewed the results from the time of this admission to 1/10/2023 15:38 EST and agree with these findings:  [x]  Laboratory  CMP    CMP 7/25/22 12/14/22 1/4/23   Glucose 102 (A) 144  (A) 140 (A)   BUN 17 16 28 (A)   Creatinine 0.97 1.05 (A) 1.18 (A)   eGFR 62.6 56.9 (A) 49.5 (A)   Sodium 138 139 136   Potassium 3.8 3.5 3.4 (A)   Chloride 96 (A) 98 98   Calcium 10.2 9.6 9.9   Total Protein 7.3 7.0 7.4   Albumin 4.40 3.90 4.2   Globulin 2.9 3.1 3.2   Total Bilirubin 0.5 0.5 0.5   Alkaline Phosphatase 64 65 55   AST (SGOT) 20 21 19   ALT (SGPT) 23 23 22   Albumin/Globulin Ratio 1.5 1.3 1.3   BUN/Creatinine Ratio 17.5 15.2 23.7   Anion Gap 13.5 10.3 12.4   (A) Abnormal value       Comments are available for some flowsheets but are not being displayed.           CBC    CBC 6/15/22 1/4/23   WBC 7.94 8.40   RBC 4.59 4.32   Hemoglobin 13.4 12.9   Hematocrit 40.3 36.7   MCV 87.8 85.0   MCH 29.2 29.9   MCHC 33.3 35.1   RDW 13.1 13.4   Platelets 264 268           []  Microbiology  []  Radiology  []  EKG/Telemetry   []  Cardiology/Vascular   []  Pathology  []  Old records  []  Other:      Assessment & Plan   Assessment / Plan     Assessment:   With total knee arthroplasty postop day 0  Hypertension  Hyperlipidemia  Osteoarthritis  Hypothyroidism    Plan:  Patient is admitted to the hospital for further workup and management of above  Patient status post left total knee arthroplasty without any immediate postoperative complications  Postoperative pain control with IV and p.o. narcotics  Patient will be started on anticoagulation postop day 1, monitor hemoglobin for stability  Resume home statin  Resume home Synthroid  Hold NSAIDs  Continue beta-blocker  Hold home antihypertensives, resume as needed  Zofran if needed for nausea  PT/OT/social work  Clinical course will dictate further management    DVT prophylaxis: SCDs, will start chemical prophylaxis postop day 1    Reviewed patients labs and imaging, and discussed with patient and nurse at bedside, will discuss with orthopedic surgery    CODE STATUS:         Admission Status:  I believe this patient meets outpatient status.      Electronically signed by  Lexa Green MD, 01/10/23, 3:38 PM EST.

## 2023-01-10 NOTE — OP NOTE
TOTAL KNEE ARTHROPLASTY WITH PRESTON NAVIGATION  Procedure Report    Patient Name:  Kim Owen  YOB: 1950    Date of Surgery:  1/10/2023       Pre-op Diagnosis:   Unilateral primary osteoarthritis, left knee [M17.12]       Post-Op Diagnosis Codes:     * Unilateral primary osteoarthritis, left knee [M17.12]    Procedure/CPT® Codes:      Procedure(s):  LEFT TOTAL KNEE ARTHROPLASTY WITH JANET ROBOT    Surgical Approach: Knee Medial Parapatellar        Staff:  Surgeon(s):  Lexa Ruano MD    Assistant: Ling Holloway RN; Malathi Potter    Anesthesia: General with Block    Estimated Blood Loss: 50ml    Implants:    Implant Name Type Inv. Item Serial No.  Lot No. LRB No. Used Action   CMT BONE PALACOS R HI/VISC 1X40 - [id number] Implant CMT BONE PALACOS R HI/VISC 1X40  [facility] [id number] Left 2 Implanted   PAT KN PERSONA ALLPOLY CMT 8X29MM - [id number] Implant PAT KN PERSONA ALLPOLY CMT 8X29MM  [employer] [id number] Left 1 Implanted   ART/SRF KN PERSONA/VE PS CD/CR 6TO7 10MM LT - [id number] Implant ART/SRF KN PERSONA/VE PS CD/CR 6TO7 10MM LT  [employer] [id number] Left 1 Implanted   COMP FEM/KN PERSONA CR CMT COCR STD SZ7 LT - [id number] Implant COMP FEM/KN PERSONA CR CMT COCR STD SZ7 LT  [employer] [id number] Left 1 Implanted   EXT STEM FEM/KN PERSONA TPR [id number] - [id number] Implant EXT STEM FEM/KN PERSONA TPR [id number]  [employer] [id number] Left 1 Implanted   STEM TIB/KN PERSONA CMT 5D SZD LT - [id number] Implant STEM TIB/KN PERSONA CMT 5D SZD LT  [employer] [id number] Left 1 Implanted   CAP TOTL KN CMT PRIMARY W/JANET - [id number] Implant CAP TOTL KN CMT PRIMARY W/JANET  [employer]  Left 1 Implanted   CAP EXT STEM KN UPCHRG - [id number] Implant CAP EXT STEM KN UPCHRG  [employer]  Left 1 Implanted       Specimen:          None    Findings: See description    Complications: None    Description of Procedure: Patient was taken to the operating room placed supine  operating table after abductor canal blocks and in preoperative holding.  After general tracheal anesthesia was established the  left knee and lower extremity were prepped and draped in standard surgical fashion using alcohol ChloraPrep.  The Josi robot was also draped sterilely and calibrated.  Incision was made 2 fingerbreadth superior superior pole of the patella down to the medial aspect of tibial tubercle the knife and full-thickness skin flaps were raised laterally and medially.  A medial parapatellar arthrotomy was then undertaken and the knee was brought into flexion.  Retractors were placed to protect protect the collateral ligaments.  Soft tissue releases and osteophytes removed as deemed necessary.  Then the tracking device was mounted on the distal femur in a standard fashion as well as through separate stab incisions in the distal tibia 5 fingerbreadths inferior to the tibial tubercle.  Then all the femoral points were mapped out using the Josi software the tibial points were mapped out as well.  While the plan for resection was being completed the patella was everted calipered and cut to the appropriate thickness.  The correct size patella was chosen in the locals for the patella were reamed and a trial patella was placed and the knee was brought back into flexion.  The distal femoral cutting block was then brought in using robotic assisted arm and the distal femoral cut was made it was checked and verified and accepted.  Then the external rotation arm of the robot was used to pin the distal femur and the correct external rotation decided by the intraoperative plan using the previously mapped points.  Then the tibia was cut after removing the robotic arm and to the appropriate position to cut the tibia the cutting block was pinned and the proximal tibia cut was made excess bone from the cut was removed and the 4-in-1 cutting block was then used in the distal femur.  The tibial cut was verified and  accepted femoral and tibial trials were then placed and the knee was taken through range of motion verifying flexion extension gaps and extension and flexion range of motion to be acceptable by using the software in a standard fashion.  Locals for the femur were then drilled the trials were removed the bone ends were copiously irrigated with bacitracin Simpulse irrigation.  The tibia was cemented in place according to marked external rotation from the trials the femur was cemented in position second and then the real polychosen was placed.  Excess cement removed from the implant edges the knee was held in extension until the cement dried and the patella was cemented into place and held with a patellar clamp.  After the cement hardened excess management from the implant edges Irrisept was used and wound was copiously irrigated the knee was taken through range of motion and checked 1 last time the patella tracked in the center of the trochlear groove the femur using no thumbs technique.  Then the arthrotomy was closed in 45 degrees of flexion with Ethibond the deep fat was closed 0 Vicryl subcu was closed with 2-0 Vicryl and the skin was closed with staples incision was washed and dried and sterile dressings were applied the patient tolerated the procedure well and was taken to recovery room.       Lexa Ruano MD     Date: 1/10/2023  Time: 17:00 EST

## 2023-01-10 NOTE — THERAPY EVALUATION
Acute Care - Physical Therapy Initial Evaluation   [place]     Patient Name: Kim Owen  : 1950  MRN: [id number]  Today's Date: 1/10/2023   Admit date: 1/10/2023     Referring Physician: Lexa Green MD     Surgery Date:1/10/2023   Procedure(s) (LRB):  LEFT TOTAL KNEE ARTHROPLASTY WITH JANET ROBOT (Left)         Visit Dx:     ICD-10-CM ICD-9-CM   1. Difficulty in walking  R26.2 719.7   2. Unilateral primary osteoarthritis, left knee  M17.12 715.16     Patient Active Problem List   Diagnosis   • Essential (primary) hypertension   • Hyperlipidemia, unspecified   • Hypothyroidism, unspecified   • Other specified disorders of bone density and structure, unspecified site   • Vitamin D deficiency, unspecified   • Major depressive disorder, recurrent, moderate (HCC)   • Primary generalized (osteo)arthritis   • Spinal stenosis, lumbar region without neurogenic claudication   • Chronic pain   • Low back pain   • Other long term (current) drug therapy   • Spondylosis without myelopathy   • Renal insufficiency   • Degeneration of lumbar intervertebral disc   • Anxiety   • S/P lumbar fusion   • Type 2 diabetes mellitus without complication, without long-term current use of insulin (HCC)   • Osteoarthrosis, localized, primary, knee, left     Past Medical History:   Diagnosis Date   • Anesthesia     STATES CAN BE SLOW TO WAKE UP AT TIMES   • Chronic pain     BACK   • Diabetes (HCC)     DOES NOT CHECK BS DAILY   • Essential (primary) hypertension    • Hyperlipidemia, unspecified    • Hypothyroidism, unspecified    • Low back pain    • Major depressive disorder, recurrent, moderate (HCC)    • Other long term (current) drug therapy    • Other specified disorders of bone density and structure, unspecified site    • Primary generalized (osteo)arthritis     L KNEE   • Spinal stenosis, lumbar region without neurogenic claudication    • Vitamin D deficiency, unspecified    • Zoster without complications      Past  Surgical History:   Procedure Laterality Date   • COLONOSCOPY  2012   • HYSTERECTOMY      AGE 48  ENDOMETRIOSIS, MENORRHAGIA   • JOINT REPLACEMENT Right 2016    KNEE   • SPINE SURGERY      L5-S1    DR. DEAN HONEYCUTT  5/21   • TOTAL KNEE ARTHROPLASTY Left 1/10/2023    Procedure: LEFT TOTAL KNEE ARTHROPLASTY WITH JANET ROBOT;  Surgeon: Lexa Ruano MD;  Location: [facility] MAIN OR;  Service: Orthopedics;  Laterality: Left;     PT Assessment (last 12 hours)     PT Evaluation and Treatment     Row Name 01/10/23 1405          Physical Therapy Time and Intention    Subjective Information complains of;pain;weakness  -DES     Document Type evaluation  -DES     Mode of Treatment individual therapy  -DES     Patient Effort adequate  -DES     Row Name 01/10/23 1405          General Information    Patient Profile Reviewed yes  -DES     Patient Observations alert;agree to therapy;cooperative  -DES     Prior Level of Function independent:  -DES     Existing Precautions/Restrictions fall;weight bearing  -DES     Row Name 01/10/23 1405          Living Environment    Current Living Arrangements home  -DES     Home Accessibility wheelchair accessible  -DES     People in Home spouse  -DES     Primary Care Provided by self;spouse/significant other  -DES     Row Name 01/10/23 1405          Range of Motion (ROM)    Range of Motion left lower extremity  Knee 5-40  -DES     Row Name 01/10/23 1405          Strength (Manual Muscle Testing)    Strength (Manual Muscle Testing) left lower extremity  5/5 DF; 3-/5 knee d/t pain  -DES     Row Name 01/10/23 1405          Mobility    Extremity Weight-bearing Status left lower extremity  -DES     Left Lower Extremity (Weight-bearing Status) weight-bearing as tolerated (WBAT)  -DES     Row Name 01/10/23 1405          Bed Mobility    Bed Mobility bed mobility (all) activities  -DES     All Activities, [place] (Bed Mobility) contact guard  -DES     Row Name 01/10/23 1405          Transfers    Transfers bed-chair  transfer;sit-stand transfer;stand-sit transfer  -DES     Maintains Weight-bearing Status (Transfers) able to maintain  -DES     Row Name 01/10/23 1405          Bed-Chair Transfer    Bed-Chair [place] (Transfers) contact guard  -DES     Assistive Device (Bed-Chair Transfers) walker, front-wheeled  -DES     Row Name 01/10/23 1405          Sit-Stand Transfer    Sit-Stand [place] (Transfers) contact guard  -DES     Assistive Device (Sit-Stand Transfers) walker, front-wheeled  -DES     Row Name 01/10/23 1405          Stand-Sit Transfer    Stand-Sit [place] (Transfers) contact guard  -DES     Assistive Device (Stand-Sit Transfers) walker, front-wheeled  -DES     Row Name 01/10/23 1405          Gait/Stairs (Locomotion)    Gait/Stairs Locomotion gait/ambulation assistive device  -DES     [place] Level (Gait) contact guard  -DES     Assistive Device (Gait) walker, front-wheeled  -DES     Ambulated day of surgery or within 4 hours of PACU discharge yes  -DES     Distance in Feet (Gait) 10  -DES     Pattern (Gait) step-to  -DES     Deviations/Abnormal Patterns (Gait) left sided deviations  -DES     Left Sided Gait Deviations knee buckling, left side  -DES     Row Name 01/10/23 1405          Balance    Balance Assessment standing dynamic balance  -DES     Dynamic Standing Balance contact guard  -DES     Position/Device Used, Standing Balance walker, front-wheeled  -DES     Row Name             Wound 01/10/23 1108 Left anterior knee Incision    Wound - Properties Group Placement Date: 01/10/23  -SC Placement Time: 1108  -SC Present on Hospital Admission: N  -SC Side: Left  -SC Orientation: anterior  -SC Location: knee  -SC Primary Wound Type: Incision  -SC    Retired Wound - Properties Group Placement Date: 01/10/23  -SC Placement Time: 1108  -SC Present on Hospital Admission: N  -SC Side: Left  -SC Orientation: anterior  -SC Location: knee  -SC Primary Wound Type: Incision  -SC    Retired Wound - Properties Group Date first  assessed: 01/10/23  -SC Time first assessed: 1108  -SC Present on Hospital Admission: N  -SC Side: Left  -SC Location: knee  -SC Primary Wound Type: Incision  -SC    Row Name 01/10/23 1405          Plan of Care Review    Plan of Care Reviewed With patient  -DES     Outcome Evaluation Pt. presents with L knee weakness and decreased ROM, significant pain noted following surgery. Outpatient therapy services recommended to improve functional independence.  -DES     Row Name 01/10/23 1405          Positioning and Restraints    Pre-Treatment Position in bed  -DES     Post Treatment Position chair  -DES     In Chair reclined  -DES     Row Name 01/10/23 1405          Therapy Assessment/Plan (PT)    Patient/Family Therapy Goals Statement (PT) Pt. wants to be able to walk with friends.  -DES     Rehab Potential (PT) good, to achieve stated therapy goals  -DES     Criteria for Skilled Interventions Met (PT) yes  -DES     Therapy Frequency (PT) 2 times/day  -DES     Predicted Duration of Therapy Intervention (PT) 10 days  -DES     Problem List (PT) problems related to;balance;mobility;range of motion (ROM);strength;pain  -DES     Activity Limitations Related to Problem List (PT) unable to ambulate safely  -DES     Row Name 01/10/23 1405          Therapy Plan Review/Discharge Plan (PT)    Therapy Plan Review (PT) evaluation/treatment results reviewed;patient  -DES     Row Name 01/10/23 1405          Physical Therapy Goals    Gait Training Goal Selection (PT) gait training, PT goal 1  -DES     ROM Goal Selection (PT) ROM, PT goal 1  -DES     Strength Goal Selection (PT) strength, PT goal 1  -DES     Row Name 01/10/23 1405          Gait Training Goal 1 (PT)    Activity/Assistive Device (Gait Training Goal 1, PT) gait (walking locomotion)  -DES     [place] Level (Gait Training Goal 1, PT) contact guard required  -DES     Distance (Gait Training Goal 1, PT) 150  -DES     Time Frame (Gait Training Goal 1, PT) long term goal (LTG);10 days  -DES      Row Name 01/10/23 1405          ROM Goal 1 (PT)    ROM Goal 1 (PT) Pt. will improve L knee ROM to 0-90  -DES     Time Frame (ROM Goal 1, PT) long-term goal (LTG);10 days  -DES     Row Name 01/10/23 1405          Strength Goal 1 (PT)    Strength Goal 1 (PT) Pt. will improve L knee strength to 3+/5  -DES     Time Frame (Strength Goal 1, PT) long term goal (LTG);10 days  -DES           User Key  (r) = Recorded By, (t) = Taken By, (c) = Cosigned By    Initials Name Provider Type    Eunice Merrill RN Registered Nurse    Gutierrez George PT Physical Therapist                Physical Therapy Education     Title: PT OT SLP Therapies (Done)     Topic: Physical Therapy (Done)     Point: Mobility training (Done)     Learning Progress Summary           Patient Acceptance, E, VU,DU by DES at 1/10/2023 1418                   Point: Precautions (Done)     Learning Progress Summary           Patient Acceptance, E, VU,DU by DES at 1/10/2023 1418                               User Key     Initials Effective Dates Name Provider Type Discipline    DES 06/03/21 -  Gutierrez [place], PT Physical Therapist PT              PT Recommendation and Plan  Anticipated Discharge Disposition (PT): home with outpatient therapy services  Planned Therapy Interventions (PT): balance training, gait training, neuromuscular re-education, patient/family education, ROM (range of motion), stair training, strengthening, stretching  Therapy Frequency (PT): 2 times/day  Plan of Care Reviewed With: patient  Outcome Evaluation: Pt. presents with L knee weakness and decreased ROM, significant pain noted following surgery. Outpatient therapy services recommended to improve functional independence.   Outcome Measures     Row Name 01/10/23 1400             How much help from another person do you currently need...    Turning from your back to your side while in flat bed without using bedrails? 3  -DES      Moving from lying on back to sitting on the side of a  flat bed without bedrails? 3  -DES      Moving to and from a bed to a chair (including a wheelchair)? 3  -DES      Standing up from a chair using your arms (e.g., wheelchair, bedside chair)? 3  -DES      Climbing 3-5 steps with a railing? 2  -DES      To walk in hospital room? 3  -DES      AM-PAC 6 Clicks Score (PT) 17  -DES            User Key  (r) = Recorded By, (t) = Taken By, (c) = Cosigned By    Initials Name Provider Type    Gutierrez George, PT Physical Therapist                 Time Calculation:    PT Charges     Row Name 01/10/23 1405             Time Calculation    PT Received On 01/10/23  -DES      PT Goal Re-Cert Due Date 01/19/23  -DES         Untimed Charges    PT Eval/Re-eval Minutes 35  -DES         Total Minutes    Untimed Charges Total Minutes 35  -DES       Total Minutes 35  -DES            User Key  (r) = Recorded By, (t) = Taken By, (c) = Cosigned By    Initials Name Provider Type    Gutierrez George, PT Physical Therapist              Therapy Charges for Today     Code Description Service Date Service Provider Modifiers Qty    27058399681 HC PT EVAL LOW COMPLEXITY 3 1/10/2023 Gutierrez Atkinson, PT GP 1          PT G-Codes  AM-PAC 6 Clicks Score (PT): Lamberto Atkinson, PT  1/10/2023

## 2023-01-10 NOTE — PLAN OF CARE
Goal Outcome Evaluation:      Pt stable throughout shift, VSS. Ambulated in room and to bathroom with nursing staff. Voiding without difficulty. Dressing clean, dry and intact. No complaint of pain since arriving to floor. Anticipates discharge tomorrow.

## 2023-01-10 NOTE — ANESTHESIA PREPROCEDURE EVALUATION
Anesthesia Evaluation     Patient summary reviewed and Nursing notes reviewed                Airway   Mallampati: I  TM distance: >3 FB  Neck ROM: full  No difficulty expected  Dental          Pulmonary - negative pulmonary ROS and normal exam    breath sounds clear to auscultation  Cardiovascular - normal exam    Rhythm: regular  Rate: normal    (+) hypertension, hyperlipidemia,       Neuro/Psych  (+) psychiatric history Anxiety and Depression,    GI/Hepatic/Renal/Endo    (+) morbid obesity,  renal disease, diabetes mellitus, thyroid problem hypothyroidism    Musculoskeletal     Abdominal    Substance History - negative use     OB/GYN negative ob/gyn ROS         Other   arthritis,                      Anesthesia Plan    ASA 4     ERAS Protocol and general with block     intravenous induction     Anesthetic plan, risks, benefits, and alternatives have been provided, discussed and informed consent has been obtained with: patient.        CODE STATUS:

## 2023-01-10 NOTE — ANESTHESIA PROCEDURE NOTES
Peripheral Block    Pre-sedation assessment completed: 1/10/2023 10:22 AM    Patient reassessed immediately prior to procedure    Patient location during procedure: pre-op  Start time: 1/10/2023 10:22 AM  Stop time: 1/10/2023 10:27 AM  Reason for block: at surgeon's request and post-op pain management  Performed by  Anesthesiologist: Reyes, Mirabelle, DO  Assisted by: Vy Jalloh RN  Preanesthetic Checklist  Completed: patient identified, IV checked, site marked, risks and benefits discussed, surgical consent, monitors and equipment checked, pre-op evaluation and timeout performed  Prep:  Pt Position: supine  Sterile barriers:alcohol skin prep, partial drape, cap, washed/disinfected hands, mask and gloves  Prep: ChloraPrep  Patient monitoring: blood pressure monitoring, continuous pulse oximetry and EKG  Procedure    Sedation: yes  Performed under: local infiltration  Guidance:ultrasound guided and nerve stimulator    ULTRASOUND INTERPRETATION.  Using ultrasound guidance a 20 G gauge needle was placed in close proximity to the femoral nerve, at which point, under ultrasound guidance anesthetic was injected in the area of the nerve and spread of the anesthesia was seen on ultrasound in close proximity thereto.  There were no abnormalities seen on ultrasound; a digital image was taken; and the patient tolerated the procedure with no complications. Images:still images obtained, printed/placed on chart    Laterality:left  Block Type:adductor canal block  Injection Technique:single-shot  Needle Type:echogenic  Needle Gauge:20 G (4in)  Resistance on Injection: none    Medications Used: bupivacaine-EPINEPHrine PF (MARCAINE w/EPI) 0.5% -1:[id number] injection - Injection, Adductor Canal   30 mL - 1/10/2023 10:27:00 AM      Post Assessment  Injection Assessment: negative aspiration for heme, no paresthesia on injection and incremental injection  Patient Tolerance:comfortable throughout block  Complications:no  Additional  Notes  The block or continuous infusion is requested by the referring physician for management of postoperative pain, or pain related to a procedure. Ultrasound guidance (deemed medically necessary). Painless injection, pt was awake and conversant during the procedure without complications. Needle and surrounding structures visualized throughout procedure. No adverse reactions or complications seen during this period. Post-procedure image showed no signs of complication, and anatomy was consistent with an uncomplicated nerve blockade.

## 2023-01-10 NOTE — H&P
"h and p      Chief Complaint  Initial Evaluation of the Right Knee and Initial Evaluation of the Left Knee        Subjective          Kim Owen presents to [facility] for initial evaluation of bilateral knees.   She had a right total knee arthroplasty in 2017.  She has had increase pain in the left knee.  She has difficulty with long distant ambulation and standing tasks. She has had no new injury or fall.  She is here to discuss surgery options for the left knee.      No Known Allergies      Social History           Socioeconomic History   • Marital status:    Tobacco Use   • Smoking status: Never   • Smokeless tobacco: Never   Vaping Use   • Vaping Use: Never used   Substance and Sexual Activity   • Alcohol use: Not Currently   • Drug use: Never         Review of Systems            Objective      Vital Signs:   Pulse 74   Ht 152.4 cm (60\")   Wt 104 kg (230 lb)   SpO2 97%   BMI 44.92 kg/m²        Physical Exam  Constitutional:       Appearance: Normal appearance. Patient is well-developed and normal weight.   HENT:      Head: Normocephalic.      Right Ear: Hearing and external ear normal.      Left Ear: Hearing and external ear normal.      Nose: Nose normal.   Eyes:      Conjunctiva/sclera: Conjunctivae normal.   Cardiovascular:      Rate and Rhythm: Normal rate.   Pulmonary:      Effort: Pulmonary effort is normal.      Breath sounds: No wheezing or rales.   Abdominal:      Palpations: Abdomen is soft.      Tenderness: There is no abdominal tenderness.   Musculoskeletal:      Cervical back: Normal range of motion.   Skin:     Findings: No rash.   Neurological:      Mental Status: Patient  is alert and oriented to person, place, and time.   Psychiatric:         Mood and Affect: Mood and affect normal.         Judgment: Judgment normal.         Ortho Exam       BILATERAL KNEES Good tone of hip flexors, hip extensors, and hip abductors. Dorsal pedal pulse 2+. Posterior " tibialis pulse is 2+. Sensation intact. Neurovascular Intact. Slight swelling. Sensation intact. Neurovascular Intact.         Procedures               Imaging Results (Most Recent)      None                   Result Review    :      X-Ray Report:  Right knee X-Ray  Indication: Evaluation of the right knee.   AP/Lateral and [place] view(s)  Findings: No signs of loosening, subsidence or periprosthetic fracture.   Prior studies available for comparison:Yes     X-Ray Report:  Left knee X-Ray  Indication: Evaluation of the left knee.   AP/Lateral and [place] view(s)  Findings: Severe arthritis.  Mild osseous abnormality, no dislocation or fracture.   Prior studies available for comparison: No                    Assessment      Assessment and Plan      Diagnoses and all orders for this visit:     1. Osteoarthritis of left knee, unspecified osteoarthritis type (Primary)           Discussed the treatment plan with the patient. Discussed conservative measures as exercises, anti-inflammatory and injection. Discussed the treatment options with the patient, operative vs non-operative. She understands the risks and benefits and ready to proceed with a left total knee arthroplasty.      Educated on risk of elevated BMI related to procedure.  Discussed options for weight loss/decreasing BMI prior to procedure including dietician consult, weight loss options and exercise program., Discussed surgery., Risks/benefits discussed with patient including, but not limited to: infection, bleeding, neurovascular damage, malunion, nonunion, aesthetic deformity, need for further surgery, and death., Discussed with patient the implant type being used during surgery and patient understands and desires to proceed., Surgery pamphlet given. and Call or return if worsening symptoms.     Follow Up      Postoperatively      Lexa Ruano MD  01/10/23

## 2023-01-11 VITALS
HEART RATE: 72 BPM | OXYGEN SATURATION: 97 % | TEMPERATURE: 98 F | SYSTOLIC BLOOD PRESSURE: 122 MMHG | RESPIRATION RATE: 16 BRPM | DIASTOLIC BLOOD PRESSURE: 56 MMHG | BODY MASS INDEX: 44.75 KG/M2 | HEIGHT: 60 IN | WEIGHT: 227.96 LBS

## 2023-01-11 LAB
DEPRECATED RDW RBC AUTO: 42 FL (ref 37–54)
ERYTHROCYTE [DISTWIDTH] IN BLOOD BY AUTOMATED COUNT: 13.4 % (ref 12.3–15.4)
HCT VFR BLD AUTO: 34.6 % (ref 34–46.6)
HGB BLD-MCNC: 12 G/DL (ref 12–15.9)
MCH RBC QN AUTO: 29.8 PG (ref 26.6–33)
MCHC RBC AUTO-ENTMCNC: 34.7 G/DL (ref 31.5–35.7)
MCV RBC AUTO: 85.9 FL (ref 79–97)
PLATELET # BLD AUTO: 273 10*3/MM3 (ref 140–450)
PMV BLD AUTO: 10 FL (ref 6–12)
RBC # BLD AUTO: 4.03 10*6/MM3 (ref 3.77–5.28)
WBC NRBC COR # BLD: 15.42 10*3/MM3 (ref 3.4–10.8)

## 2023-01-11 PROCEDURE — A9270 NON-COVERED ITEM OR SERVICE: HCPCS | Performed by: INTERNAL MEDICINE

## 2023-01-11 PROCEDURE — 25010000002 CEFAZOLIN IN DEXTROSE 2-4 GM/100ML-% SOLUTION: Performed by: ORTHOPAEDIC SURGERY

## 2023-01-11 PROCEDURE — 63710000001 LEVOTHYROXINE 25 MCG TABLET: Performed by: INTERNAL MEDICINE

## 2023-01-11 PROCEDURE — 97116 GAIT TRAINING THERAPY: CPT

## 2023-01-11 PROCEDURE — 63710000001 ACETAMINOPHEN 500 MG TABLET: Performed by: ORTHOPAEDIC SURGERY

## 2023-01-11 PROCEDURE — 63710000001 BUPROPION 100 MG TABLET: Performed by: INTERNAL MEDICINE

## 2023-01-11 PROCEDURE — 25010000002 KETOROLAC TROMETHAMINE PER 15 MG: Performed by: ORTHOPAEDIC SURGERY

## 2023-01-11 PROCEDURE — A9270 NON-COVERED ITEM OR SERVICE: HCPCS | Performed by: ORTHOPAEDIC SURGERY

## 2023-01-11 PROCEDURE — 63710000001 APIXABAN 2.5 MG TABLET: Performed by: ORTHOPAEDIC SURGERY

## 2023-01-11 PROCEDURE — 63710000001 METOPROLOL SUCCINATE XL 25 MG TABLET SUSTAINED-RELEASE 24 HOUR: Performed by: INTERNAL MEDICINE

## 2023-01-11 PROCEDURE — 99213 OFFICE O/P EST LOW 20 MIN: CPT | Performed by: PHYSICIAN ASSISTANT

## 2023-01-11 PROCEDURE — 63710000001 HYDROCODONE-ACETAMINOPHEN 7.5-325 MG TABLET: Performed by: ORTHOPAEDIC SURGERY

## 2023-01-11 PROCEDURE — 94799 UNLISTED PULMONARY SVC/PX: CPT

## 2023-01-11 PROCEDURE — 85027 COMPLETE CBC AUTOMATED: CPT | Performed by: ORTHOPAEDIC SURGERY

## 2023-01-11 PROCEDURE — 97150 GROUP THERAPEUTIC PROCEDURES: CPT

## 2023-01-11 PROCEDURE — 97530 THERAPEUTIC ACTIVITIES: CPT

## 2023-01-11 PROCEDURE — 97165 OT EVAL LOW COMPLEX 30 MIN: CPT

## 2023-01-11 PROCEDURE — 97535 SELF CARE MNGMENT TRAINING: CPT

## 2023-01-11 RX ORDER — LEVOTHYROXINE SODIUM 0.03 MG/1
25 TABLET ORAL
Status: DISCONTINUED | OUTPATIENT
Start: 2023-01-11 | End: 2023-01-11 | Stop reason: HOSPADM

## 2023-01-11 RX ORDER — ASPIRIN 325 MG
325 TABLET, DELAYED RELEASE (ENTERIC COATED) ORAL DAILY
Qty: 21 TABLET | Refills: 0 | Status: SHIPPED | OUTPATIENT
Start: 2023-01-11 | End: 2023-01-16

## 2023-01-11 RX ORDER — HYDROCODONE BITARTRATE AND ACETAMINOPHEN 7.5; 325 MG/1; MG/1
1 TABLET ORAL EVERY 4 HOURS PRN
Qty: 45 TABLET | Refills: 0 | Status: SHIPPED | OUTPATIENT
Start: 2023-01-11 | End: 2023-01-19 | Stop reason: SDUPTHER

## 2023-01-11 RX ADMIN — APIXABAN 2.5 MG: 2.5 TABLET, FILM COATED ORAL at 08:54

## 2023-01-11 RX ADMIN — CEFAZOLIN SODIUM 2 G: 2 INJECTION, SOLUTION INTRAVENOUS at 02:15

## 2023-01-11 RX ADMIN — BUPROPION HYDROCHLORIDE 100 MG: 100 TABLET, FILM COATED ORAL at 09:07

## 2023-01-11 RX ADMIN — KETOROLAC TROMETHAMINE 15 MG: 15 INJECTION, SOLUTION INTRAMUSCULAR; INTRAVENOUS at 02:10

## 2023-01-11 RX ADMIN — KETOROLAC TROMETHAMINE 15 MG: 15 INJECTION, SOLUTION INTRAMUSCULAR; INTRAVENOUS at 08:53

## 2023-01-11 RX ADMIN — ACETAMINOPHEN 1000 MG: 500 TABLET ORAL at 09:07

## 2023-01-11 RX ADMIN — LEVOTHYROXINE SODIUM 25 MCG: 0.03 TABLET ORAL at 05:45

## 2023-01-11 RX ADMIN — METOPROLOL SUCCINATE 25 MG: 25 TABLET, EXTENDED RELEASE ORAL at 08:54

## 2023-01-11 RX ADMIN — HYDROCODONE BITARTRATE AND ACETAMINOPHEN 1 TABLET: 7.5; 325 TABLET ORAL at 11:24

## 2023-01-11 NOTE — CASE MANAGEMENT/SOCIAL WORK
Discharge Planning Assessment   [place]     Patient Name: Kim Owen  MRN: [id number]  Today's Date: 1/11/2023    Admit Date: 1/10/2023    Plan: Patient is s/p LTKR. RN CM met with patient at bedside. Facesheet reviewed, RN CM role and discharge planning discussed. Patient is independent at home with ADLs. Patient has . Patient denies DME need, has rolling walker and bedside commode at home. Patient will be following up with ARIN ROJAS, 1/12/23 at 9:00am at discharge. Spouse to provide transportation home at discharge. RN CM will continue to follow.   Discharge Needs Assessment     Row Name 01/11/23 1200       Living Environment    People in Home spouse    Current Living Arrangements home    Primary Care Provided by self    Provides Primary Care For no one    Family Caregiver if Needed spouse    Quality of Family Relationships helpful;involved;supportive    Able to Return to Prior Arrangements yes       Resource/Environmental Concerns    Resource/Environmental Concerns none    Transportation Concerns none       Transition Planning    Patient/Family Anticipates Transition to home with family    Patient/Family Anticipated Services at Transition outpatient care;rehabilitation services    Transportation Anticipated family or friend will provide       Discharge Needs Assessment    Readmission Within the Last 30 Days no previous admission in last 30 days    Equipment Currently Used at Home walker, rolling;commode    Concerns to be Addressed discharge planning    Anticipated Changes Related to Illness none    Equipment Needed After Discharge none    Discharge Facility/Level of Care Needs outpatient therapy    Provided Post Acute Provider List? N/A    Provided Post Acute Provider Quality & Resource List? N/A               Discharge Plan     Row Name 01/11/23 1201       Plan    Plan Patient is s/p LTKR. RN CM met with patient at bedside. Facesheet reviewed, RN CM role and discharge planning discussed. Patient is  independent at home with ADLs. Patient has . Patient denies DME need, has rolling walker and bedside commode at home. Patient will be following up with ARIN ROJAS, 1/12/23 at 9:00am at discharge. Spouse to provide transportation home at discharge. RN AKOSUA will continue to follow.    Final Discharge Disposition Code 01 - home or self-care              Continued Care and Services - Admitted Since 1/10/2023    Coordination has not been started for this encounter.       Expected Discharge Date and Time     Expected Discharge Date Expected Discharge Time    Jan 11, 2023          Demographic Summary     Row Name 01/11/23 1141       General Information    Admission Type inpatient    Arrived From home;PACU/recovery room    Referral Source admission list    Reason for Consult discharge planning    Preferred Language English       Contact Information    Permission Granted to Share Info With family/designee    Contact Information Obtained for                Functional Status     Row Name 01/11/23 1200       Functional Status    Usual Activity Tolerance good    Current Activity Tolerance good       Assessment of Health Literacy    How often do you have someone help you read hospital materials? Occasionally    How often do you have problems learning about your medical condition because of difficulty understanding written information? Never    How often do you have a problem understanding what is told to you about your medical condition? Never    How confident are you filling out medical forms by yourself? Quite a bit    Health Literacy Good       Functional Status, IADL    Medications independent    Meal Preparation independent    Housekeeping independent    Laundry independent    Shopping independent       Mental Status    General Appearance WDL WDL       Mental Status Summary    Recent Changes in Mental Status/Cognitive Functioning no changes               Psychosocial    No documentation.                 Abuse/Neglect    No documentation.                Legal    No documentation.                Substance Abuse    No documentation.                Patient Forms    No documentation.                   Diana Merritt RN

## 2023-01-11 NOTE — THERAPY TREATMENT NOTE
Acute Care - Physical Therapy Treatment Note  PAULY Schofield     Patient Name: Kim Owen  : 1950  MRN: [id number]  Today's Date: 2023 Left Knee Ther-ex   Exercise  Reps  Sets    Long arc Quads   10 2   Short arc Quads   10 2   Heel Slides  10 2   Ankle Pumps  10 2   Quad sets  10 2   Glut sets  10 2   Straight leg raise  10 2              Visit Dx:     ICD-10-CM ICD-9-CM   1. Difficulty in walking  R26.2 719.7   2. Unilateral primary osteoarthritis, left knee  M17.12 715.16   3. Osteoarthrosis, localized, primary, knee, left  M17.12 715.16   4. Decreased activities of daily living (ADL)  Z78.9 V49.89     Patient Active Problem List   Diagnosis   • Essential (primary) hypertension   • Hyperlipidemia, unspecified   • Hypothyroidism, unspecified   • Other specified disorders of bone density and structure, unspecified site   • Vitamin D deficiency, unspecified   • Major depressive disorder, recurrent, moderate (HCC)   • Primary generalized (osteo)arthritis   • Spinal stenosis, lumbar region without neurogenic claudication   • Chronic pain   • Low back pain   • Other long term (current) drug therapy   • Spondylosis without myelopathy   • Renal insufficiency   • Degeneration of lumbar intervertebral disc   • Anxiety   • S/P lumbar fusion   • Type 2 diabetes mellitus without complication, without long-term current use of insulin (HCC)   • Osteoarthrosis, localized, primary, knee, left     Past Medical History:   Diagnosis Date   • Anesthesia     STATES CAN BE SLOW TO WAKE UP AT TIMES   • Chronic pain     BACK   • Diabetes (HCC)     DOES NOT CHECK BS DAILY   • Essential (primary) hypertension    • Hyperlipidemia, unspecified    • Hypothyroidism, unspecified    • Low back pain    • Major depressive disorder, recurrent, moderate (HCC)    • Other long term (current) drug therapy    • Other specified disorders of bone density and structure, unspecified site    • Primary generalized (osteo)arthritis     L KNEE   •  Spinal stenosis, lumbar region without neurogenic claudication    • Vitamin D deficiency, unspecified    • Zoster without complications      Past Surgical History:   Procedure Laterality Date   • COLONOSCOPY  2012   • HYSTERECTOMY      AGE 48  ENDOMETRIOSIS, MENORRHAGIA   • JOINT REPLACEMENT Right 2016    KNEE   • SPINE SURGERY      L5-S1    DR. DEAN HONEYCUTT  5/21   • TOTAL KNEE ARTHROPLASTY Left 1/10/2023    Procedure: LEFT TOTAL KNEE ARTHROPLASTY WITH JANET ROBOT;  Surgeon: Lexa Ruano MD;  Location: [facility] OR;  Service: Orthopedics;  Laterality: Left;     PT Assessment (last 12 hours)     PT Evaluation and Treatment     [facility] Name 01/11/23 1248          Physical Therapy Time and Intention    Subjective Information complains of;pain;weakness  -     Document Type therapy note (daily note)  -     Mode of Treatment physical therapy;group therapy;individual therapy  -     Patient Effort fair  -     Row Name 01/11/23 1248          Pain Scale: FACES Pre/Post-Treatment    Posttreatment Pain Rating --  3/10  -     Pain Location - Side/Orientation Left  -     Pain Location - knee  -     Row Name 01/11/23 1248          Range of Motion (ROM)    Range of Motion --  Pt L knee AAROM at 90 degrees flex and 6 degrees ext.  -[facility] Name 01/11/23 1248          Strength (Manual Muscle Testing)    Strength (Manual Muscle Testing) --  Pt L knee ext strength at 3-/5.  -[facility] Name 01/11/23 1248          Mobility    Extremity Weight-bearing Status left lower extremity  -     Left Lower Extremity (Weight-bearing Status) weight-bearing as tolerated (WBAT)  -     Row Name 01/11/23 1248          Transfers    Transfers sit-stand transfer;stand-sit transfer;car transfer  -     Maintains Weight-bearing Status (Transfers) able to maintain  -     Row Name 01/11/23 1248          Sit-Stand Transfer    Sit-Stand [place] (Transfers) contact guard  -     Assistive Device (Sit-Stand Transfers) walker,  front-wheeled  -RH     Row Name 01/11/23 1248          Stand-Sit Transfer    Stand-Sit [place] (Transfers) contact guard  -RH     Assistive Device (Stand-Sit Transfers) walker, front-wheeled  -RH     Row Name 01/11/23 1248          Car Transfer    Type (Car Transfer) sit-stand;stand-sit  -RH     [place] Level (Car Transfer) contact guard  -RH     Assistive Device (Car Transfer) walker, front-wheeled  -RH     Row Name 01/11/23 1248          Gait/Stairs (Locomotion)    Gait/Stairs Locomotion gait/ambulation independence;gait/ambulation assistive device;distance ambulated;gait pattern;gait deviations  -RH     [place] Level (Gait) contact guard  -RH     Assistive Device (Gait) walker, front-wheeled  -RH     Distance in Feet (Gait) 135  -RH     Pattern (Gait) 3-point;step-through  -RH     Deviations/Abnormal Patterns (Gait) antalgic;base of support, narrow;gait speed decreased;stride length decreased  -RH     Left Sided Gait Deviations decreased knee extension;heel strike decreased  -RH     Gait Assessment/Intervention Pt amb with RW and CGA with 3 point step-through gait pattern and decreased LLE heel strike.  -RH     Negotiation (Stairs) stairs independence;stairs assistive device;handrail location;number of steps;ascending technique;descending technique  -RH     [place] Level (Stairs) contact guard  -RH     Handrail Location (Stairs) both sides  -RH     Number of Steps (Stairs) 5  -RH     Ascending Technique (Stairs) step-to-step  -RH     Descending Technique (Stairs) step-to-step  -RH     Row Name 01/11/23 1248          Balance    Dynamic Standing Balance contact guard  -RH     Position/Device Used, Standing Balance walker, front-wheeled  -RH     Row Name             Wound 01/10/23 1108 Left anterior knee Incision    Wound - Properties Group Placement Date: 01/10/23  -SC Placement Time: 1108  -SC Present on Hospital Admission: N  -SC Side: Left  -SC Orientation: anterior  -SC Location: knee  -SC  Primary Wound Type: Incision  -SC    Retired Wound - Properties Group Placement Date: 01/10/23  -SC Placement Time: 1108  -SC Present on Hospital Admission: N  -SC Side: Left  -SC Orientation: anterior  -SC Location: knee  -SC Primary Wound Type: Incision  -SC    Retired Wound - Properties Group Date first assessed: 01/10/23  -SC Time first assessed: 1108  -SC Present on Hospital Admission: N  -SC Side: Left  -SC Location: knee  -SC Primary Wound Type: Incision  -SC    Row Name 01/11/23 1248          Vital Signs    O2 Delivery Intra Treatment room air  -     Row Name 01/11/23 1248          Progress Summary (PT)    Progress Toward Functional Goals (PT) progress toward functional goals is fair  -RH           User Key  (r) = Recorded By, (t) = Taken By, (c) = Cosigned By    Initials Name Provider Type    Eunice Merrill, RN Registered Nurse    RH Kalyan Topete PTA Physical Therapist Assistant                Physical Therapy Education     Title: PT OT SLP Therapies (Done)     Topic: Physical Therapy (Done)     Point: Mobility training (Done)     Learning Progress Summary           Patient Acceptance, E, VU,DU by DES at 1/10/2023 1418                   Point: Precautions (Done)     Learning Progress Summary           Patient Acceptance, E, VU,DU by DES at 1/10/2023 1418                               User Key     Initials Effective Dates Name Provider Type Discipline    DES 06/03/21 -  Gutierrez Atkinson, PT Physical Therapist PT              PT Recommendation and Plan     Progress Summary (PT)  Progress Toward Functional Goals (PT): progress toward functional goals is fair   Outcome Measures     Row Name 01/11/23 1200 01/10/23 1400          How much help from another person do you currently need...    Turning from your back to your side while in flat bed without using bedrails? 3  -RH 3  -DES     Moving from lying on back to sitting on the side of a flat bed without bedrails? 3  -RH 3  -DES     Moving to and from a bed  to a chair (including a wheelchair)? 3  -RH 3  -DES     Standing up from a chair using your arms (e.g., wheelchair, bedside chair)? 4  -RH 3  -DES     Climbing 3-5 steps with a railing? 4  -RH 2  -DES     To walk in hospital room? 4  -RH 3  -DES     AM-PAC 6 Clicks Score (PT) 21  -RH 17  -DES           User Key  (r) = Recorded By, (t) = Taken By, (c) = Cosigned By    Initials Name Provider Type    Kalyan Fang PTA Physical Therapist Assistant    Gutierrez George, PT Physical Therapist                 Time Calculation:    PT Charges     Row Name 01/11/23 1247             Time Calculation    PT Received On 01/11/23  -RH         Timed Charges    19162 - Gait Training Minutes  9  -RH      61609 - PT Therapeutic Activity Minutes 5  -RH         Untimed Charges    PT Group Therapy Minutes 35  -RH         Total Minutes    Timed Charges Total Minutes 14  -RH      Untimed Charges Total Minutes 35  -RH       Total Minutes 49  -RH            User Key  (r) = Recorded By, (t) = Taken By, (c) = Cosigned By    Initials Name Provider Type    Kalyan Fang PTA Physical Therapist Assistant              Therapy Charges for Today     Code Description Service Date Service Provider Modifiers Qty    91079674908 HC GAIT TRAINING EA 15 MIN 1/11/2023 Kalyan Topete PTA GP 1    36243822115 HC PT THER PROC GROUP 1/11/2023 Kalyan Topete PTA GP 1          PT G-Codes  Outcome Measure Options: AM-PAC 6 Clicks Daily Activity (OT), Optimal Instrument  AM-PAC 6 Clicks Score (PT): 21  AM-PAC 6 Clicks Score (OT): 21    Kalyan Topete PTA  1/11/2023

## 2023-01-11 NOTE — THERAPY EVALUATION
Patient Name: Kim Owen  : 1950    MRN: [id number]                              Today's Date: 2023       Admit Date: 1/10/2023    Visit Dx:     ICD-10-CM ICD-9-CM   1. Difficulty in walking  R26.2 719.7   2. Unilateral primary osteoarthritis, left knee  M17.12 715.16   3. Osteoarthrosis, localized, primary, knee, left  M17.12 715.16   4. Decreased activities of daily living (ADL)  Z78.9 V49.89     Patient Active Problem List   Diagnosis   • Essential (primary) hypertension   • Hyperlipidemia, unspecified   • Hypothyroidism, unspecified   • Other specified disorders of bone density and structure, unspecified site   • Vitamin D deficiency, unspecified   • Major depressive disorder, recurrent, moderate (HCC)   • Primary generalized (osteo)arthritis   • Spinal stenosis, lumbar region without neurogenic claudication   • Chronic pain   • Low back pain   • Other long term (current) drug therapy   • Spondylosis without myelopathy   • Renal insufficiency   • Degeneration of lumbar intervertebral disc   • Anxiety   • S/P lumbar fusion   • Type 2 diabetes mellitus without complication, without long-term current use of insulin (HCC)   • Osteoarthrosis, localized, primary, knee, left     Past Medical History:   Diagnosis Date   • Anesthesia     STATES CAN BE SLOW TO WAKE UP AT TIMES   • Chronic pain     BACK   • Diabetes (HCC)     DOES NOT CHECK BS DAILY   • Essential (primary) hypertension    • Hyperlipidemia, unspecified    • Hypothyroidism, unspecified    • Low back pain    • Major depressive disorder, recurrent, moderate (HCC)    • Other long term (current) drug therapy    • Other specified disorders of bone density and structure, unspecified site    • Primary generalized (osteo)arthritis     L KNEE   • Spinal stenosis, lumbar region without neurogenic claudication    • Vitamin D deficiency, unspecified    • Zoster without complications      Past Surgical History:   Procedure Laterality Date   •  COLONOSCOPY  2012   • HYSTERECTOMY      AGE 48  ENDOMETRIOSIS, MENORRHAGIA   • JOINT REPLACEMENT Right 2016    KNEE   • SPINE SURGERY      L5-S1    DR. DEAN HONEYCUTT  5/21   • TOTAL KNEE ARTHROPLASTY Left 1/10/2023    Procedure: LEFT TOTAL KNEE ARTHROPLASTY WITH JANET ROBOT;  Surgeon: eLxa Ruano MD;  Location: [facility] MAIN OR;  Service: Orthopedics;  Laterality: Left;      General Information     Row Name 01/11/23 0916 01/11/23 0906       OT Time and Intention    Document Type therapy note (daily note)  - evaluation  -    Mode of Treatment individual therapy;occupational therapy  - individual therapy;occupational therapy  -    Row Name 01/11/23 0906          General Information    Patient Profile Reviewed yes  -     Prior Level of Function --  (I) with ADLs, ambulated w/o a device but has RW from prior sx, has a step over tub with shower chair/grab bars/handheld shower head available, elevated commode, stands to groom, drives, and no home O2.  -     Existing Precautions/Restrictions fall;weight bearing  WBAT LLE  -     Barriers to Rehab none identified  -     Row Name 01/11/23 0906          Occupational Profile    Reason for Services/Referral (Occupational Profile) Patient is a 72 year old female who is currently status post left total knee replacement using janet robot on January 10th, 2023. Occupational therapy consulted due to recent decline in ADLs/functional transfers. No previous occupational therapy services for current condition.  -     Row Name 01/11/23 0906          Living Environment    People in Home spouse  -     Row Name 01/11/23 0906          Home Main Entrance    Number of Stairs, Main Entrance none  -     Row Name 01/11/23 0906          Stairs Within Home, Primary    Number of Stairs, Within Home, Primary none  -     Row Name 01/11/23 0906          Cognition    Orientation Status (Cognition) oriented x 4  -     Row Name 01/11/23 0906          Safety Issues, Functional  Mobility    Impairments Affecting Function (Mobility) balance;pain  -           User Key  (r) = Recorded By, (t) = Taken By, (c) = Cosigned By    Initials Name Provider Type    LF Omaira Nathan OT Occupational Therapist                 Mobility/ADL's     Row Name 01/11/23 0916 01/11/23 0908       Bed Mobility    Comment, (Bed Mobility) Patient upright and seated in recliner upon therapist arrival.  -LF Patient upright and seated in recliner upon therapist arrival.  -    Row Name 01/11/23 0916 01/11/23 0908       Transfers    Transfers sit-stand transfer;stand-sit transfer  -LF sit-stand transfer;stand-sit transfer  -LF    Row Name 01/11/23 0916 01/11/23 0908       Sit-Stand Transfer    Sit-Stand [place] (Transfers) contact guard  -LF contact guard  -LF    Assistive Device (Sit-Stand Transfers) walker, front-wheeled  -LF walker, front-wheeled  -LF    Row Name 01/11/23 0916 01/11/23 0908       Stand-Sit Transfer    Stand-Sit [place] (Transfers) contact guard  -LF contact guard  -LF    Assistive Device (Stand-Sit Transfers) walker, front-wheeled  -LF walker, front-wheeled  -LF    Row Name 01/11/23 0916 01/11/23 0908       Activities of Daily Living    BADL Assessment/Intervention upper body dressing;lower body dressing;grooming  -LF bathing;upper body dressing;lower body dressing;grooming;feeding;toileting  -    Row Name 01/11/23 0916 01/11/23 0908       Mobility    Extremity Weight-bearing Status left lower extremity  -LF left lower extremity  -LF    Left Lower Extremity (Weight-bearing Status) weight-bearing as tolerated (WBAT)  -LF weight-bearing as tolerated (WBAT)  -    Row Name 01/11/23 0908          Bathing Assessment/Intervention    [place] Level (Bathing) bathing skills;upper body;set up;lower body;minimum assist (75% patient effort)  -     Row Name 01/11/23 0916 01/11/23 0908       Upper Body Dressing Assessment/Training    [place] Level (Upper Body Dressing) upper body  dressing skills;don;bra/undergarment;pull-over garment;set up  - upper body dressing skills;set up  -LF    Position (Upper Body Dressing) unsupported sitting  -LF --    Row Name 01/11/23 0916 01/11/23 0908       Lower Body Dressing Assessment/Training    [place] Level (Lower Body Dressing) lower body dressing skills;don;pants/bottoms;shoes/slippers;doff;socks;minimum assist (75% patient effort)  - lower body dressing skills;minimum assist (75% patient effort)  -    Position (Lower Body Dressing) unsupported sitting;supported standing  - --    Row Name 01/11/23 0916 01/11/23 0908       Grooming Assessment/Training    [place] Level (Grooming) grooming skills;hair care, combing/brushing;set up  - grooming skills;set up  -LF    Position (Grooming) unsupported sitting  -LF --    Row Name 01/11/23 0908          Self-Feeding Assessment/Training    [place] Level (Feeding) feeding skills;set up  -[facility] Name 01/11/23 0908          Toileting Assessment/Training    [place] Level (Toileting) toileting skills;contact guard assist;minimum assist (75% patient effort)  -           User Key  (r) = Recorded By, (t) = Taken By, (c) = Cosigned By    Initials Name Provider Type     Omaira Nathan OT Occupational Therapist               Obj/Interventions     [facility] Name 01/11/23 0909          Sensory Assessment (Somatosensory)    Sensory Assessment (Somatosensory) UE sensation intact  -LF     Row Name 01/11/23 0909          Vision Assessment/Intervention    Visual Impairment/Limitations WFL  -     Row Name 01/11/23 0909          Range of Motion Comprehensive    General Range of Motion bilateral upper extremity ROM [facility] Name 01/11/23 0909          Strength Comprehensive (MMT)    Comment, General Manual Muscle Testing (MMT) Assessment 5/5 BUEs  -[facility] Name 01/11/23 0909          Motor Skills    Motor Skills coordination;functional endurance  -     [facility]ination [facility]      Functional Endurance Good for BADLs  -LF     Row Name 01/11/23 0917 01/11/23 0909       Balance    Balance Assessment sitting dynamic balance;standing dynamic balance  -LF sitting dynamic balance;standing dynamic balance  -LF    Dynamic Sitting Balance independent  -LF independent  -LF    Position, Sitting Balance unsupported;sitting in chair  -LF unsupported;sitting in chair  -LF    Dynamic Standing Balance contact guard  -LF contact guard  -LF    Position/Device Used, Standing Balance supported;walker, front-wheeled  -LF supported;walker, front-wheeled  -LF    Balance Interventions sitting;standing;sit to stand;supported;dynamic;occupation based/functional task;weight shifting activity  -LF --          User Key  (r) = Recorded By, (t) = Taken By, (c) = Cosigned By    Initials Name Provider Type     Omaira Nathan, OT Occupational Therapist               Goals/Plan     Row Name 01/11/23 0911          Bed Mobility Goal 1 (OT)    Activity/Assistive Device (Bed Mobility Goal 1, OT) bed mobility activities, all  -LF     [place] Level/Cues Needed (Bed Mobility Goal 1, OT) modified independence  -LF     Time Frame (Bed Mobility Goal 1, OT) long term goal (LTG);10 days  -     Row Name 01/11/23 0911          Transfer Goal 1 (OT)    Activity/Assistive Device (Transfer Goal 1, OT) transfers, all;walker, rolling  -LF     [place] Level/Cues Needed (Transfer Goal 1, OT) modified independence  -LF     Time Frame (Transfer Goal 1, OT) long term goal (LTG);10 days  -     Row Name 01/11/23 0911          Bathing Goal 1 (OT)    Activity/Device (Bathing Goal 1, OT) bathing skills, all;lower body bathing  -LF     [place] Level/Cues Needed (Bathing Goal 1, OT) modified independence  -LF     Time Frame (Bathing Goal 1, OT) long term goal (LTG);10 days  -     Row Name 01/11/23 0911          Dressing Goal 1 (OT)    Activity/Device (Dressing Goal 1, OT) dressing skills, all;lower body dressing  -LF      [place]/Cues Needed (Dressing Goal 1, OT) modified independence  -LF     Time Frame (Dressing Goal 1, OT) long term goal (LTG);10 days  -     Row Name 01/11/23 0911          Toileting Goal 1 (OT)    Activity/Device (Toileting Goal 1, OT) toileting skills, all  -LF     [place] Level/Cues Needed (Toileting Goal 1, OT) modified independence  -LF     Time Frame (Toileting Goal 1, OT) long term goal (LTG);10 days  -     Row Name 01/11/23 0911          Therapy Assessment/Plan (OT)    Planned Therapy Interventions (OT) activity tolerance training;patient/caregiver education/training;BADL retraining;functional balance retraining;occupation/activity based interventions;transfer/mobility retraining  -           User Key  (r) = Recorded By, (t) = Taken By, (c) = Cosigned By    Initials Name Provider Type     Omaira Nathan, OT Occupational Therapist               Clinical Impression     Row Name 01/11/23 0910          Pain Assessment    Additional Documentation Pain Scale: FACES Pre/Post-Treatment (Group)  -     Row Name 01/11/23 0910          Pain Scale: FACES Pre/Post-Treatment    Pain: FACES Scale, Pretreatment 2-->hurts little bit  -LF     Posttreatment Pain Rating 2-->hurts little bit  -LF     Pain Location - Side/Orientation Left  -LF     Pain Location - knee  -LF     Row Name 01/11/23 0910          Plan of Care Review    Plan of Care Reviewed With patient  -LF     Progress no change  -LF     Outcome Evaluation Patient presents with limitations in self-care, functional transfers, and balance. She would benefit from continued skilled occupational therapy services to maximize independence with ADLs/functional transfers. Home with assist and outpatient physical therapy services recommended upon discharge from hospital.  -     Row Name 01/11/23 0910          Therapy Assessment/Plan (OT)    Patient/Family Therapy Goal Statement (OT) To walk with her friends.  -LF     Rehab Potential (OT) good, to  achieve stated therapy goals  -     Criteria for Skilled Therapeutic Interventions Met (OT) yes;meets criteria;skilled treatment is necessary  -     Therapy Frequency (OT) 5 times/wk  -     Row Name 01/11/23 0910          Therapy Plan Review/Discharge Plan (OT)    Anticipated Discharge Disposition (OT) home with assist;home with outpatient therapy services  -     Row Name 01/11/23 0910          Vital Signs    O2 Delivery Pre Treatment room air  -LF     O2 Delivery Intra Treatment room air  -LF     O2 Delivery Post Treatment room air  -LF           User Key  (r) = Recorded By, (t) = Taken By, (c) = Cosigned By    Initials Name Provider Type     Omaira Nathan, OT Occupational Therapist               Outcome Measures     Row Name 01/11/23 0911          How much help from another is currently needed...    Putting on and taking off regular lower body clothing? 3  -LF     Bathing (including washing, rinsing, and drying) 3  -LF     Toileting (which includes using toilet bed pan or urinal) 3  -LF     Putting on and taking off regular upper body clothing 4  -LF     Taking care of personal grooming (such as brushing teeth) 4  -LF     Eating meals 4  -LF     AM-PAC 6 Clicks Score (OT) 21  -LF     Row Name 01/10/23 5753          How much help from another person do you currently need...    Turning from your back to your side while in flat bed without using bedrails? 3  -HR     Moving from lying on back to sitting on the side of a flat bed without bedrails? 3  -HR     Moving to and from a bed to a chair (including a wheelchair)? 3  -HR     Standing up from a chair using your arms (e.g., wheelchair, bedside chair)? 3  -HR     Climbing 3-5 steps with a railing? 2  -HR     To walk in hospital room? 3  -HR     AM-PAC 6 Clicks Score (PT) 17  -HR     Highest level of mobility 5 --> Static standing  -HR     Row Name 01/11/23 0911          Functional Assessment    Outcome Measure Options AM-PAC 6 Clicks Daily Activity  (OT);Optimal Instrument  -LF     Row Name 01/11/23 0911          Optimal Instrument    Optimal Instrument Optimal - 3  -LF     Bending/Stooping 2  -LF     Standing 2  -LF     Reaching 1  -LF     From the list, choose the 3 activities you would most like to be able to do without any difficulty Bending/stooping;Standing;Reaching  -LF     Total Score Optimal - 3 5  -LF           User Key  (r) = Recorded By, (t) = Taken By, (c) = Cosigned By    Initials Name Provider Type    HR Kaelyn Selas, RN Registered Nurse     Omaira Nathan OT Occupational Therapist                Occupational Therapy Education     Title: PT OT SLP Therapies (Done)     Topic: Occupational Therapy (Done)     Point: ADL training (Done)     Description:   Instruct learner(s) on proper safety adaptation and remediation techniques during self care or transfers.   Instruct in proper use of assistive devices.              Learning Progress Summary           Patient Acceptance, E,TB, VU by  at 1/11/2023 0912                   Point: Precautions (Done)     Description:   Instruct learner(s) on prescribed precautions during self-care and functional transfers.              Learning Progress Summary           Patient Acceptance, E,TB, VU by  at 1/11/2023 0912                   Point: Body mechanics (Done)     Description:   Instruct learner(s) on proper positioning and spine alignment during self-care, functional mobility activities and/or exercises.              Learning Progress Summary           Patient Acceptance, E,TB, VU by  at 1/11/2023 0912                               User Key     Initials Effective Dates Name Provider Type [facility] 06/16/21 -  Omaira Nathan OT Occupational Therapist OT              OT Recommendation and Plan  Planned Therapy Interventions (OT): activity tolerance training, patient/caregiver education/training, BADL retraining, functional balance retraining, occupation/activity based interventions,  transfer/mobility retraining  Therapy Frequency (OT): 5 times/wk  Plan of Care Review  Plan of Care Reviewed With: patient  Progress: no change  Outcome Evaluation: Patient presents with limitations in self-care, functional transfers, and balance. She would benefit from continued skilled occupational therapy services to maximize independence with ADLs/functional transfers. Home with assist and outpatient physical therapy services recommended upon discharge from hospital.     Time Calculation:    Time Calculation- OT     Row Name 01/11/23 0913             Time Calculation- OT    OT Received On 01/11/23  -LF      OT Goal Re-Cert Due Date 01/20/23  -LF         Timed Charges    72457 - OT Therapeutic Activity Minutes 8  -LF      92350 - OT Self Care/Mgmt Minutes 20  -LF         Untimed Charges    OT Eval/Re-eval Minutes 25  -LF         Total Minutes    Timed Charges Total Minutes 28  -LF      Untimed Charges Total Minutes 25  -LF       Total Minutes 53  -LF            User Key  (r) = Recorded By, (t) = Taken By, (c) = Cosigned By    Initials Name Provider Type    LF Omaira Nathan OT Occupational Therapist              Therapy Charges for Today     Code Description Service Date Service Provider Modifiers Qty    96019322066 HC OT THERAPEUTIC ACT EA 15 MIN 1/11/2023 Omaira Nathan OT GO 1    69615268383 HC OT SELF CARE/MGMT/TRAIN EA 15 MIN 1/11/2023 Omaira Nathan OT GO 1    55871482754 HC OT EVAL LOW COMPLEXITY 2 1/11/2023 Omaira Nathan OT GO 1               Omaira Nathan OT  1/11/2023

## 2023-01-11 NOTE — PLAN OF CARE
Goal Outcome Evaluation:      Pt stable throughout the shift. PT cleared pt to dc. Pt denies any equipment needs at this time. Pt and  stated that they have all needed equipment at home.

## 2023-01-11 NOTE — PROGRESS NOTES
[facility]   Hospitalist Progress Note       Patient Name: Kim Owen  : 1950  MRN: [id number]  Primary Care Physician: Lindy Chavarria APRN  Date of admission: 1/10/2023  Today's Date: 2023  Room / Bed:   231/1  Subjective   Chief Complaint: Left knee pain     HPI:  Patient is a 72-year-old female past medical history significant for osteoarthritis who presents for scheduled left total knee arthroplasty. Patient states that prior to the procedure the pain had gradually been worsening over the past several years. The patient reports pain and functional impairment including activities of daily living, they have moderate to severe resting pain, chronic inflammation, and swelling. The patient states that this pain is no longer relieved with conservative. The pain is dull and achy in nature, nonradiating, worse with activity. Because of the above the patient is admitted for postsurgical pain control, symptom management and rehab evaluation.  We are consulted for management of the chronical medical issues.    Interval Followup: 2023    • Doing well after recent surgery.  Left knee pain reasonably controlled.  Has been ambulating short distances on her new knee.  Vital signs remained stable.  Follow-up postop labs stable; white count 15 likely reactive from surgery.  No fevers.  Postop nausea resolved.  Patient very eager to return home.  She has outpatient physical therapy scheduled for tomorrow.  Planning on orthopedic follow-up in 10-14 days as well.        REVIEW OF SYSTEMS: All other systems reviewed and are negative.   • GEN: No fevers. No chills. No weight gain. No weight loss.     • HEENT:   No dysphagia/odynophagia. No visual disturbance.    • GI:    No N/V.  No abd pain. No diarrhea. No constipation.  No bloody/black tarry stools. No hematemesis.   • CV: No chest discomfort.  No palps. No lightheadedness. No syncope. No orthopnea/PND. No edema.   • RESP:    No cough. No wheeze.  No  increased sputum. No hemoptysis. No OG.  • :   No dysuria or suprapubic discomfort. No frequency.No urgency. No hesitancy. No incontinence. No hematuria. No flank pain.    • MS:   Left knee joint stiffness and pain. No myalgias. No muscle weakness.    • SKIN:   No painful or pruritic rashes.  No skin discoloration.  • NEURO:  No focal numbness or weakness.  No headaches.  No ataxia. No slurred speech. No receptive/expressive aphasia.      • PSYCH:   No anxiety. No depression.  • ENDO:  No tremor, hair loss, heat or cold intolerance.  Objective   Temp:  [97.2 °F (36.2 °C)-98 °F (36.7 °C)] 98 °F (36.7 °C)  Heart Rate:  [65-87] 72  Resp:  [14-18] 16  BP: (112-135)/(54-69) 122/56  Flow (L/min):  [1-3.5] 1  PHYSICAL EXAM   • CON: WN. WD. NAD.   • EYES:  Sclera anicteric. EOMI. Normal conjunctiva.   • ENT:  Oropharyngeal mucosa without ulcers or thrush.    • NECK:  No thyromegaly. No stridor. Trachea midline.  • RESP:  CTA. No wheezes. No crackles.  No work of breathing or tachypnea.   • CV:  Rhythm regular. Rate WNL. No murmur noted.  Trace edema.  • GI:  Soft and nontender. Nondistended.  Bowel sounds present.   • EXT: Left knee dressing intact.  Left lower extremity intact neurovascularly.  • LYMPH:  No lymphedema noted.  No cervical lymphadenopathy.  • PSYCH:  Alert. Oriented. Normal affect and mood.  • NEURO:  CNII-XII grossly intact. No dysarthria or aphasia. No unilateral weakness or paresthesia.  • SKIN: No chronic venous stasis changes or varicosities.  No cellulitis    Results from last 7 days   Lab Units 01/11/23  0404   WBC 10*3/mm3 15.42*   HEMOGLOBIN g/dL 12.0   HEMATOCRIT % 34.6   PLATELETS 10*3/mm3 273               RESULTS REVIEWED:  I have personally reviewed the results from the time of this admission to 1/11/2023 14:55 EST and agree with these findings:  []  Laboratory  []  Microbiology  []  Radiology  []  EKG/Telemetry   []  Cardiology/Vascular   []  Pathology  []  Old records  []   Other:  Assessment / Plan   Assessment:    Left knee osteoarthritis   Left knee pain   Status post left total knee arthroplasty postop day 1  Hypertension  Hyperlipidemia  Osteoarthritis  Hypothyroidism     Plan:    Remains medically stable.  She is doing well from a physical therapy standpoint as well.  Eager to return home.   at bedside and concurs.  Ready to return home today.  Pain readily controlled on current oral meds.  Eager and motivated to do rehab.  Outpatient physical therapy set up for tomorrow.  Postop DVT prophylaxis and pain Rx per orthopedist.  They will be following up in the orthopedic clinic in 2 weeks.  Home meds will be resumed.      DVT prophylaxis:  Medical and mechanical DVT prophylaxis orders are present.  CODE STATUS:            Electronically signed by CARLO Lord, 01/11/23, 2:55 PM EST.

## 2023-01-11 NOTE — PLAN OF CARE
Goal Outcome Evaluation:  Plan of Care Reviewed With: patient        Progress: no change  Outcome Evaluation: Patient presents with limitations in self-care, functional transfers, and balance. She would benefit from continued skilled occupational therapy services to maximize independence with ADLs/functional transfers. Home with assist and outpatient physical therapy services recommended upon discharge from hospital.

## 2023-01-11 NOTE — DISCHARGE INSTRUCTIONS
Leave current dressing (Aquacel) in place until 1/13  , remove and clean with alcohol swabs and replace Aqaucel. Leave in place additional 3 days until 1/16 . Then begin daily /dry dressing changes as instructed at discharge until follow up with Ortho MD.     Please see handout provided at discharge for additional instructions.       Additionally, take all medications as prescribed. Complete Eliquis completely before beginning Aspirin.

## 2023-01-11 NOTE — THERAPY DISCHARGE NOTE
Acute Care - Occupational Therapy Discharge  [facility]     Patient Name: Kim Owen  : 1950  MRN: [id number]  Today's Date: 2023               Admit Date: 1/10/2023       ICD-10-CM ICD-9-CM   1. Difficulty in walking  R26.2 719.7   2. Unilateral primary osteoarthritis, left knee  M17.12 715.16   3. Osteoarthrosis, localized, primary, knee, left  M17.12 715.16   4. Decreased activities of daily living (ADL)  Z78.9 V49.89     Patient Active Problem List   Diagnosis   • Essential (primary) hypertension   • Hyperlipidemia, unspecified   • Hypothyroidism, unspecified   • Other specified disorders of bone density and structure, unspecified site   • Vitamin D deficiency, unspecified   • Major depressive disorder, recurrent, moderate (HCC)   • Primary generalized (osteo)arthritis   • Spinal stenosis, lumbar region without neurogenic claudication   • Chronic pain   • Low back pain   • Other long term (current) drug therapy   • Spondylosis without myelopathy   • Renal insufficiency   • Degeneration of lumbar intervertebral disc   • Anxiety   • S/P lumbar fusion   • Type 2 diabetes mellitus without complication, without long-term current use of insulin ([facility])   • Osteoarthrosis, localized, primary, knee, left     Past Medical History:   Diagnosis Date   • Anesthesia     STATES CAN BE SLOW TO WAKE UP AT TIMES   • Chronic pain     BACK   • Diabetes (HCC)     DOES NOT CHECK BS DAILY   • Essential (primary) hypertension    • Hyperlipidemia, unspecified    • Hypothyroidism, unspecified    • Low back pain    • Major depressive disorder, recurrent, moderate (HCC)    • Other long term (current) drug therapy    • Other specified disorders of bone density and structure, unspecified site    • Primary generalized (osteo)arthritis     L KNEE   • Spinal stenosis, lumbar region without neurogenic claudication    • Vitamin D deficiency, unspecified    • Zoster without complications      Past Surgical History:   Procedure  Laterality Date   • COLONOSCOPY  2012   • HYSTERECTOMY      AGE 48  ENDOMETRIOSIS, MENORRHAGIA   • JOINT REPLACEMENT Right 2016    KNEE   • SPINE SURGERY      L5-S1    DR. DEAN HONEYCUTT  5/21   • TOTAL KNEE ARTHROPLASTY Left 1/10/2023    Procedure: LEFT TOTAL KNEE ARTHROPLASTY WITH JANET ROBOT;  Surgeon: Lexa Ruano MD;  Location: [facility] MAIN OR;  Service: Orthopedics;  Laterality: Left;       OT ASSESSMENT FLOWSHEET (last 12 hours)     OT Evaluation and Treatment    No documentation.                 Occupational Therapy Education     Title: PT OT SLP Therapies (Resolved)     Topic: Occupational Therapy (Resolved)     Point: ADL training (Resolved)     Description:   Instruct learner(s) on proper safety adaptation and remediation techniques during self care or transfers.   Instruct in proper use of assistive devices.              Learning Progress Summary           Patient Acceptance, E,TB, VU by  at 1/11/2023 0912                   Point: Precautions (Resolved)     Description:   Instruct learner(s) on prescribed precautions during self-care and functional transfers.              Learning Progress Summary           Patient Acceptance, E,TB, VU by  at 1/11/2023 0912                   Point: Body mechanics (Resolved)     Description:   Instruct learner(s) on proper positioning and spine alignment during self-care, functional mobility activities and/or exercises.              Learning Progress Summary           Patient Acceptance, E,TB, VU by  at 1/11/2023 0912                               User Key     Initials Effective Dates Name Provider Type Discipline     06/16/21 -  Omaira Nathan OT Occupational Therapist OT                OT Recommendation and Plan  Planned Therapy Interventions (OT): activity tolerance training, patient/caregiver education/training, BADL retraining, functional balance retraining, occupation/activity based interventions, transfer/mobility retraining  Therapy Frequency (OT): 5  times/wk  Plan of Care Review  Plan of Care Reviewed With: patient  Progress: no change  Outcome Evaluation: Patient presents with limitations in self-care, functional transfers, and balance. She would benefit from continued skilled occupational therapy services to maximize independence with ADLs/functional transfers. Home with assist and outpatient physical therapy services recommended upon discharge from hospital.  Plan of Care Reviewed With: patient  Outcome Evaluation: Patient presents with limitations in self-care, functional transfers, and balance. She would benefit from continued skilled occupational therapy services to maximize independence with ADLs/functional transfers. Home with assist and outpatient physical therapy services recommended upon discharge from hospital.      OT Rehab Goals     Row Name 01/11/23 0911             Bed Mobility Goal 1 (OT)    Activity/Assistive Device (Bed Mobility Goal 1, OT) bed mobility activities, all  -LF      [place] Level/Cues Needed (Bed Mobility Goal 1, OT) modified independence  -LF      Time Frame (Bed Mobility Goal 1, OT) long term goal (LTG);10 days  -LF         Transfer Goal 1 (OT)    Activity/Assistive Device (Transfer Goal 1, OT) transfers, all;walker, rolling  -LF      [place] Level/Cues Needed (Transfer Goal 1, OT) modified independence  -LF      Time Frame (Transfer Goal 1, OT) long term goal (LTG);10 days  -LF         Bathing Goal 1 (OT)    Activity/Device (Bathing Goal 1, OT) bathing skills, all;lower body bathing  -LF      [place] Level/Cues Needed (Bathing Goal 1, OT) modified independence  -LF      Time Frame (Bathing Goal 1, OT) long term goal (LTG);10 days  -LF         Dressing Goal 1 (OT)    Activity/Device (Dressing Goal 1, OT) dressing skills, all;lower body dressing  -LF      [place]/Cues Needed (Dressing Goal 1, OT) modified independence  -LF      Time Frame (Dressing Goal 1, OT) long term goal (LTG);10 days  -LF          Toileting Goal 1 (OT)    Activity/Device (Toileting Goal 1, OT) toileting skills, all  -LF      [place] Level/Cues Needed (Toileting Goal 1, OT) modified independence  -LF      Time Frame (Toileting Goal 1, OT) long term goal (LTG);10 days  -LF            User Key  (r) = Recorded By, (t) = Taken By, (c) = Cosigned By    Initials Name Provider Type Discipline    LF Omaira Nathan, OT Occupational Therapist OT                 Outcome Measures     Row Name 01/11/23 1200 01/10/23 1400          How much help from another person do you currently need...    Turning from your back to your side while in flat bed without using bedrails? 3  -RH 3  -DES     Moving from lying on back to sitting on the side of a flat bed without bedrails? 3  -RH 3  -DES     Moving to and from a bed to a chair (including a wheelchair)? 3  -RH 3  -DES     Standing up from a chair using your arms (e.g., wheelchair, bedside chair)? 4  -RH 3  -DES     Climbing 3-5 steps with a railing? 4  -RH 2  -DES     To walk in hospital room? 4  -RH 3  -DES     AM-PAC 6 Clicks Score (PT) 21  -RH 17  -DES           User Key  (r) = Recorded By, (t) = Taken By, (c) = Cosigned By    Initials Name Provider Type    RH Kalyan Topete, PTA Physical Therapist Assistant    Gutierrez George, PT Physical Therapist                Time Calculation:    Time Calculation- OT     Row Name 01/11/23 1247 01/11/23 0913          Time Calculation- OT    OT Received On -- 01/11/23  -LF     OT Goal Re-Cert Due Date -- 01/20/23  -LF        Timed Charges    69557 - Gait Training Minutes  9  -RH --     17893 - OT Therapeutic Activity Minutes -- 8  -LF     71352 - OT Self Care/Mgmt Minutes -- 20  -LF        Untimed Charges    OT Eval/Re-eval Minutes -- 25  -LF        Total Minutes    Timed Charges Total Minutes 9  -RH 28  -LF     Untimed Charges Total Minutes -- 25  -LF      Total Minutes 9  -RH 53  -LF           User Key  (r) = Recorded By, (t) = Taken By, (c) = Cosigned By    Initials Name  Provider Type    RH Kalyan Topete PTA Physical Therapist Assistant    LF Omaira Nathan OT Occupational Therapist              Timed Therapy Charges  Total Units: 2    Suggested Charges  Total Units: 2    Procedure Name Documented Minutes Units Code    HC OT SELF CARE/MGMT/TRAIN EA 15 MIN 20  1    09069 (CPT®)      HC OT THERAPEUTIC ACT EA 15 MIN 8  1    00504 (CPT®)               Documented Minutes  Total Minutes: 28    Therapy Provided Minutes    87764 - OT Self Care/Mgmt Minutes 20    66348 - OT Therapeutic Activity Minutes 8              Therapy Charges for Today     Code Description Service Date Service Provider Modifiers Qty    06188758804 HC OT THERAPEUTIC ACT EA 15 MIN 1/11/2023 Omaira Nathan OT GO 1    97089803260 HC OT SELF CARE/MGMT/TRAIN EA 15 MIN 1/11/2023 Omaira Nathan OT GO 1    66127688999 HC OT EVAL LOW COMPLEXITY 2 1/11/2023 Omaira Nathan OT GO 1               OT Discharge Summary  Anticipated Discharge Disposition (OT): home with assist, home with outpatient therapy services  Reason for Discharge: Discharge from facility, Per MD order  Outcomes Achieved: Discharge from facility occurred on same date as evluation  Discharge Destination: Home with outpatient services, Home with assist    Omaira Nathan OT  1/11/2023

## 2023-01-11 NOTE — PLAN OF CARE
Goal Outcome Evaluation:  Plan of Care Reviewed With: patient           Outcome Evaluation: VSS NO DISTRESS NOTED PATIENT HAS AMBULATED IN THE APARICIO TONIGHT AND TO THE BATHROOM VOIDING WITHOUT ISSUE MEDICATE WITH SCHEDULED MEDS ONLY AT THIS TIME

## 2023-01-16 ENCOUNTER — OFFICE VISIT (OUTPATIENT)
Dept: FAMILY MEDICINE CLINIC | Age: 73
End: 2023-01-16
Payer: MEDICARE

## 2023-01-16 VITALS
HEIGHT: 60 IN | DIASTOLIC BLOOD PRESSURE: 86 MMHG | BODY MASS INDEX: 45.12 KG/M2 | HEART RATE: 87 BPM | TEMPERATURE: 98.3 F | WEIGHT: 229.8 LBS | SYSTOLIC BLOOD PRESSURE: 145 MMHG

## 2023-01-16 DIAGNOSIS — Z98.890 S/P ARTHROSCOPY OF LEFT KNEE: Primary | ICD-10-CM

## 2023-01-16 PROCEDURE — 99213 OFFICE O/P EST LOW 20 MIN: CPT | Performed by: NURSE PRACTITIONER

## 2023-01-16 NOTE — PROGRESS NOTES
Chief Complaint  Kim Owen presents to [facility] for Hospital Follow Up Visit (Left knee replacement @ [facility] 1/10/23)    Subjective          History of Present Illness    Kim is following up after hospitalization at [facility] for left knee replacement. She feels that she is overall doing fairly well. She has been doing her exercises at home and physical therapy. Has had some pain. Using ice packs and taking pain medication. Incision and staples are intact. Changing dressing as advised. She has follow up scheduled with ortho. She is currently on eliquis and will restart aspirin after completing eliquis course. Discharge summary is not available for review at time of visit.     Review of Systems   Constitutional: Negative for chills and fever.   Eyes: Negative for blurred vision and redness.   Respiratory: Negative for shortness of breath and wheezing.    Cardiovascular: Negative for chest pain and palpitations.   Gastrointestinal: Negative for constipation, diarrhea and vomiting.   Genitourinary: Negative for frequency and urgency.   Musculoskeletal:        Left knee pain with pain medication prescribed   Skin:        Left knee incision and staples   Neurological: Negative for dizziness and seizures.   Psychiatric/Behavioral: Negative for suicidal ideas and depressed mood.         No Known Allergies   Past Medical History:   Diagnosis Date   • Anesthesia     STATES CAN BE SLOW TO WAKE UP AT TIMES   • Chronic pain     BACK   • Diabetes (HCC)     DOES NOT CHECK BS DAILY   • Essential (primary) hypertension    • Hyperlipidemia, unspecified    • Hypothyroidism, unspecified    • Low back pain    • Major depressive disorder, recurrent, moderate (HCC)    • Other long term (current) drug therapy    • Other specified disorders of bone density and structure, unspecified site    • Primary generalized (osteo)arthritis     L KNEE   • Spinal stenosis, lumbar region without  neurogenic claudication    • Vitamin D deficiency, unspecified    • Zoster without complications      Current Outpatient Medications   Medication Sig Dispense Refill   • apixaban (ELIQUIS) 2.5 MG tablet tablet Take 1 tablet by mouth Every 12 (Twelve) Hours. 14 tablet 0   • atorvastatin (LIPITOR) 10 MG tablet Take 1 tablet by mouth every night at bedtime. 90 tablet 1   • buPROPion (WELLBUTRIN) 100 MG tablet Take 1 tablet by mouth Daily. (Patient taking differently: Take 100 mg by mouth Every Night.) 90 tablet 1   • chlorthalidone (HYGROTON) 25 MG tablet Take 1 tablet by mouth Daily. (Patient taking differently: Take 25 mg by mouth Every Night.) 90 tablet 1   • HYDROcodone-acetaminophen (Norco) 7.5-325 MG per tablet Take 1 tablet by mouth Every 4 (Four) Hours As Needed for Moderate Pain. 45 tablet 0   • levothyroxine (SYNTHROID, LEVOTHROID) 25 MCG tablet Take 1 tablet by mouth Daily. 90 tablet 1   • losartan (COZAAR) 100 MG tablet Take 1 tablet by mouth Daily. (Patient taking differently: Take 100 mg by mouth Every Night.) 90 tablet 1   • metFORMIN ER (GLUCOPHAGE-XR) 500 MG 24 hr tablet Take 1 tablet by mouth 2 (Two) Times a Day for 180 days. (Patient taking differently: Take 500 mg by mouth 2 (Two) Times a Day. INSTRUCTED PER ANESTHESIA PROTOCOL) 180 tablet 1   • metoprolol succinate XL (TOPROL-XL) 25 MG 24 hr tablet Take 1 tablet by mouth Daily. (Patient taking differently: Take 25 mg by mouth Every Night.) 90 tablet 1     No current facility-administered medications for this visit.     Past Surgical History:   Procedure Laterality Date   • COLONOSCOPY  2012   • HYSTERECTOMY      AGE 48  ENDOMETRIOSIS, MENORRHAGIA   • JOINT REPLACEMENT Right 2016    KNEE   • SPINE SURGERY      L5-S1    DR. DEAN HONEYCUTT  5/21   • TOTAL KNEE ARTHROPLASTY Left 1/10/2023    Procedure: LEFT TOTAL KNEE ARTHROPLASTY WITH JANET ROBOT;  Surgeon: Lexa Ruano MD;  Location: [facility] MAIN OR;  Service: Orthopedics;  Laterality: Left;     "  Social History     Tobacco Use   • Smoking status: Never   • Smokeless tobacco: Never   Vaping Use   • Vaping Use: Never used   Substance Use Topics   • Alcohol use: Not Currently   • Drug use: Never     Family History   Problem Relation Age of Onset   • Diabetes type II Father    • Alcohol abuse Father    • Malig Hyperthermia Neg Hx      Health Maintenance Due   Topic Date Due   • ZOSTER VACCINE (1 of 2) Never done   • MAMMOGRAM  12/07/2022      Immunization History   Administered Date(s) Administered   • COVID-19 (PFIZER) BIVALENT BOOSTER 12+YRS 12/14/2022   • COVID-19 (PFIZER) PURPLE CAP 03/15/2021, 04/05/2021, 12/02/2021   • Covid-19 (Pfizer) Gray Cap 07/19/2022   • Fluzone High-Dose 65+yrs 10/18/2021, 10/31/2022   • Influenza, Unspecified 12/02/2020   • Pneumococcal Conjugate 20-Valent (PCV20) 07/19/2022   • Pneumococcal Polysaccharide (PPSV23) 12/02/2020        Objective     Vitals:    01/16/23 1633   BP: 145/86   BP Location: Left arm   Patient Position: Sitting   Cuff Size: Large Adult   Pulse: 87   Temp: 98.3 °F (36.8 °C)   TempSrc: Oral   Weight: 104 kg (229 lb 12.8 oz)   Height: 152.4 cm (60\")     Body mass index is 44.88 kg/m².     Physical Exam  Vitals reviewed.   Constitutional:       General: She is not in acute distress.     Appearance: Normal appearance. She is well-developed.   HENT:      Head: Normocephalic and atraumatic.   Cardiovascular:      Rate and Rhythm: Normal rate and regular rhythm.   Pulmonary:      Effort: Pulmonary effort is normal.      Breath sounds: Normal breath sounds.   Musculoskeletal:      Comments: Using walker, slow gait, mildly tender left knee   Skin:     Comments: Left knee incisions with intact staples, clean, healing, well approximated   Neurological:      Mental Status: She is alert and oriented to person, place, and time.   Psychiatric:         Mood and Affect: Mood and affect normal.           Result Review :                               Assessment and Plan  "     Diagnoses and all orders for this visit:    1. S/P arthroscopy of left knee (Primary)    We have reviewed her care today. She will complete eliquis course and then resume daily aspirin. She will continue physical therapy and at home exercises. Her dressing was changed in the office today. She will continue post op incision care. Will keep scheduled f/u with ortho. She was told that staples would be removed at that time. She reports that she has pain medication that she can take as needed and was told to call ortho if increased pain or low on medication.          Follow Up     Return for Next scheduled follow up.

## 2023-01-19 ENCOUNTER — TELEPHONE (OUTPATIENT)
Dept: ORTHOPEDIC SURGERY | Facility: CLINIC | Age: 73
End: 2023-01-19
Payer: MEDICARE

## 2023-01-19 DIAGNOSIS — M17.12 OSTEOARTHROSIS, LOCALIZED, PRIMARY, KNEE, LEFT: ICD-10-CM

## 2023-01-19 RX ORDER — HYDROCODONE BITARTRATE AND ACETAMINOPHEN 7.5; 325 MG/1; MG/1
1 TABLET ORAL EVERY 4 HOURS PRN
Qty: 42 TABLET | Refills: 0 | Status: SHIPPED | OUTPATIENT
Start: 2023-01-19 | End: 2023-02-27

## 2023-01-25 ENCOUNTER — OFFICE VISIT (OUTPATIENT)
Dept: ORTHOPEDIC SURGERY | Facility: CLINIC | Age: 73
End: 2023-01-25
Payer: MEDICARE

## 2023-01-25 VITALS — BODY MASS INDEX: 44.96 KG/M2 | HEIGHT: 60 IN | WEIGHT: 229 LBS

## 2023-01-25 DIAGNOSIS — Z47.1 AFTERCARE FOLLOWING LEFT KNEE JOINT REPLACEMENT SURGERY: Primary | ICD-10-CM

## 2023-01-25 DIAGNOSIS — Z96.652 AFTERCARE FOLLOWING LEFT KNEE JOINT REPLACEMENT SURGERY: Primary | ICD-10-CM

## 2023-01-25 PROCEDURE — 99024 POSTOP FOLLOW-UP VISIT: CPT | Performed by: ORTHOPAEDIC SURGERY

## 2023-01-25 NOTE — PROGRESS NOTES
"Chief Complaint  Follow-up of the Left Knee     Subjective      Kim Owen presents to [facility] for follow up left knee total knee arthroplasty with sherif winn on 1/10/23.  Her staples were removed today. She ambulates with the left knee. She took a bath Friday and something popped in the back of her knee and she has some pain. Staples were removed today.     No Known Allergies     Social History     Socioeconomic History   • Marital status:    Tobacco Use   • Smoking status: Never   • Smokeless tobacco: Never   Vaping Use   • Vaping Use: Never used   Substance and Sexual Activity   • Alcohol use: Not Currently   • Drug use: Never   • Sexual activity: Defer        Review of Systems     Objective   Vital Signs:   Ht 152.4 cm (60\")   Wt 104 kg (229 lb)   BMI 44.72 kg/m²       Physical Exam  Constitutional:       Appearance: Normal appearance. Patient is well-developed and normal weight.   HENT:      Head: Normocephalic.      Right Ear: Hearing and external ear normal.      Left Ear: Hearing and external ear normal.      Nose: Nose normal.   Eyes:      Conjunctiva/sclera: Conjunctivae normal.   Cardiovascular:      Rate and Rhythm: Normal rate.   Pulmonary:      Effort: Pulmonary effort is normal.      Breath sounds: No wheezing or rales.   Abdominal:      Palpations: Abdomen is soft.      Tenderness: There is no abdominal tenderness.   Musculoskeletal:      Cervical back: Normal range of motion.   Skin:     Findings: No rash.   Neurological:      Mental Status: Patient  is alert and oriented to person, place, and time.   Psychiatric:         Mood and Affect: Mood and affect normal.         Judgment: Judgment normal.       Ortho Exam      LEFT KNEE Daiana were removed today. No evidence of wound dehiscence, surrounding erythema, warmth, or drainage. Flexion 110. Extension -5. Stable to varus/valgus stress. Stable to anterior/posterior drawer. Neurovascularly intact. " Negative Nelly. Negative Lachman. Positive EHL, FHL, HS and TA. Sensation intact to light touch all 5 nerves of the foot. Ambulates with Antalgic gait. Patella is well tracking. Calf supple, non-tender. Negative tenderness to the medial joint line. Negative tenderness to the lateral joint line. Negative Crepitus. Good strength to hamstrings, quadriceps, dorsiflexors, and plantar flexors.  Knee Extensor Mechanism intact. No evidence of wound dehiscence, surrounding erythema, warmth, or drainage.       Procedures        Imaging Results (Most Recent)     Procedure Component Value Units Date/Time    XR Knee 3 View Left [[id number]] Resulted: 01/25/23 0938     Updated: 01/25/23 0938           Result Review :     X-Ray Report:  Left knee X-Ray  Indication: Evaluation of the left knee  AP/Lateral and Warden view(s)  Findings: Intact left knee replacement. No signs of loosening, subsidence or periprosthetic fracture.   Prior studies available for comparison: Yes            Assessment and Plan     Diagnoses and all orders for this visit:    1. Aftercare following left knee joint replacement surgery (Primary)  -     XR Knee 3 View Left        I reviewed the X-rays that were obtained today with the patient. Discussed the treatment plan with the patient. Continue with physical therapy.  The patient expressed understanding.     Educated on risk of elevated BMI.  Discussed options for weight loss/decreasing BMI prior to procedure including dietician consult, weight loss options and exercise program. and Call or return if worsening symptoms.    Follow Up     4 weeks    Patient was given instructions and counseling regarding her condition or for health maintenance advice. Please see specific information pulled into the AVS if appropriate.     Scribed for Lexa Ruano MD by Mary Moffett MA.  01/25/23   09:42 EST    I have personally performed the services described in this document as scribed by the above individual and it  is both accurate and complete. Lexa Ruano MD 01/25/23

## 2023-01-30 DIAGNOSIS — E03.9 HYPOTHYROIDISM, UNSPECIFIED TYPE: ICD-10-CM

## 2023-01-30 RX ORDER — LEVOTHYROXINE SODIUM 0.03 MG/1
25 TABLET ORAL DAILY
Qty: 90 TABLET | Refills: 1 | Status: CANCELLED | OUTPATIENT
Start: 2023-01-30

## 2023-01-30 NOTE — TELEPHONE ENCOUNTER
Caller: Kim Owen    Relationship: Self    Best call back number: [phone]    Requested Prescriptions:   Requested Prescriptions     Pending Prescriptions Disp Refills   • levothyroxine (SYNTHROID, LEVOTHROID) 25 MCG tablet 90 tablet 1     Sig: Take 1 tablet by mouth Daily.        Pharmacy where request should be sent: [address] [phone] [facility] [phone] [facility]     Does the patient have less than a 3 day supply:  [] Yes  [x] No    Would you like a call back once the refill request has been completed: [] Yes [x] No    If the office needs to give you a call back, can they leave a voicemail: [] Yes [x] No    Elidia Harp Rep   01/30/23 09:28 EST

## 2023-02-06 ENCOUNTER — HOSPITAL ENCOUNTER (OUTPATIENT)
Dept: MAMMOGRAPHY | Facility: HOSPITAL | Age: 73
Discharge: HOME | End: 2023-02-06
Admitting: NURSE PRACTITIONER
Payer: MEDICARE

## 2023-02-06 DIAGNOSIS — Z12.31 SCREENING MAMMOGRAM FOR BREAST CANCER: ICD-10-CM

## 2023-02-06 PROCEDURE — 77067 SCR MAMMO BI INCL CAD: CPT

## 2023-02-06 PROCEDURE — 77063 BREAST TOMOSYNTHESIS BI: CPT

## 2023-02-27 ENCOUNTER — OFFICE VISIT (OUTPATIENT)
Dept: ORTHOPEDIC SURGERY | Facility: CLINIC | Age: 73
End: 2023-02-27
Payer: MEDICARE

## 2023-02-27 VITALS — HEIGHT: 60 IN | BODY MASS INDEX: 44.96 KG/M2 | WEIGHT: 229 LBS

## 2023-02-27 DIAGNOSIS — Z47.1 AFTERCARE FOLLOWING LEFT KNEE JOINT REPLACEMENT SURGERY: Primary | ICD-10-CM

## 2023-02-27 DIAGNOSIS — Z96.652 AFTERCARE FOLLOWING LEFT KNEE JOINT REPLACEMENT SURGERY: Primary | ICD-10-CM

## 2023-02-27 DIAGNOSIS — M25.562 LEFT KNEE PAIN, UNSPECIFIED CHRONICITY: ICD-10-CM

## 2023-02-27 PROCEDURE — 99024 POSTOP FOLLOW-UP VISIT: CPT | Performed by: PHYSICIAN ASSISTANT

## 2023-02-27 RX ORDER — HYDROCODONE BITARTRATE AND ACETAMINOPHEN 5; 325 MG/1; MG/1
1 TABLET ORAL EVERY 6 HOURS PRN
Qty: 28 TABLET | Refills: 0 | Status: SHIPPED | OUTPATIENT
Start: 2023-02-27

## 2023-02-27 RX ORDER — IBUPROFEN 800 MG/1
TABLET ORAL
COMMUNITY
Start: 2023-02-07 | End: 2023-03-08

## 2023-02-27 NOTE — PROGRESS NOTES
"Chief Complaint  Follow-up and Pain of the Left Knee    Subjective      Kim Owen presents to [facility] for follow-up of left total knee arthroplasty with Josi robot on 1/10/2023 Dr. Ruano.  She presents independently ambulatory without use of assistive device.  She reports she is doing \"pretty good and getting better\".  She remains in outpatient physical therapy at [facility].  She has been able to return to most of her baseline activities/ADLs without complaints of pain or difficulties    Objective   No Known Allergies    Vital Signs:   Ht 152.4 cm (60\")   Wt 104 kg (229 lb)   BMI 44.72 kg/m²       Physical Exam    Constitutional: Awake, alert. Well nourished appearance.    Integumentary: Warm, dry, intact. No obvious rashes.    HENT: Atraumatic, normocephalic.   Respiratory: Non labored respirations .   Cardiovascular: Intact peripheral pulses.    Psychiatric: Normal mood and affect. A&O X3    Ortho Exam  Left knee: Mild edema.  Well-healed surgical scar.  Skin is warm, dry, and intact.  Patella is well tracking and knee is stable to varus and valgus stress.  Full knee extension and flexion to 120 degrees.  Full plantarflexion dorsiflexion of the ankle.  Sensation intact to light touch.  Distal neurovascular intact.  Smooth sit to stand transition.  Patient is fully weightbearing with nonantalgic gait.    Imaging Results (Most Recent)     None                  Assessment and Plan   Problem List Items Addressed This Visit    None  Visit Diagnoses     Aftercare following left knee joint replacement surgery    -  Primary        Follow Up   Return in about 6 weeks (around 4/10/2023).  Patient is a non-smoker.  Did not discuss options for smoking cessation.    Patient Instructions   Patient is doing very well. Advised to continue PT to completion to progress strength and ROM. Continue home exercises.     Continue icing knee as needed up to 4 times daily for no longer than 15-20 mins at " a time.     Follow-up in 6 weeks. Repeat x-rays needed. Call with changes or concerns.       Patient was given instructions and counseling regarding her condition or for health maintenance advice. Please see specific information pulled into the AVS if appropriate.

## 2023-02-27 NOTE — PATIENT INSTRUCTIONS
Patient is doing very well. Advised to continue PT to completion to progress strength and ROM. Continue home exercises.     Continue icing knee as needed up to 4 times daily for no longer than 15-20 mins at a time.     Follow-up in 6 weeks. Repeat x-rays needed. Call with changes or concerns.

## 2023-02-27 NOTE — TELEPHONE ENCOUNTER
Patient seen in office today, 2-. Request [facility] pain medication. [facility]

## 2023-03-08 RX ORDER — IBUPROFEN 800 MG/1
TABLET ORAL
Qty: 90 TABLET | Refills: 0 | Status: SHIPPED | OUTPATIENT
Start: 2023-03-08

## 2023-03-31 DIAGNOSIS — I10 ESSENTIAL (PRIMARY) HYPERTENSION: ICD-10-CM

## 2023-03-31 NOTE — TELEPHONE ENCOUNTER
Caller: Kim Owen    Relationship: Self    Best call back number: [phone]    Requested Prescriptions:   Requested Prescriptions     Pending Prescriptions Disp Refills   • losartan (COZAAR) 100 MG tablet 90 tablet 1     Sig: Take 1 tablet by mouth Daily.        Pharmacy where request should be sent: [address] [phone] [facility] [phone] FX     Last office visit with prescribing clinician: 1/16/2023   Last telemedicine visit with prescribing clinician: 6/14/2023   Next office visit with prescribing clinician: 6/14/2023     Additional details provided by patient:     Does the patient have less than a 3 day supply:  [] Yes  [x] No    Would you like a call back once the refill request has been completed: [x] Yes [] No    If the office needs to give you a call back, can they leave a voicemail: [x] Yes [] No    Elidia Myles Rep   03/31/23 16:28 EDT

## 2023-04-03 RX ORDER — LOSARTAN POTASSIUM 100 MG/1
100 TABLET ORAL DAILY
Qty: 90 TABLET | Refills: 1 | Status: SHIPPED | OUTPATIENT
Start: 2023-04-03

## 2023-04-19 ENCOUNTER — OFFICE VISIT (OUTPATIENT)
Dept: ORTHOPEDIC SURGERY | Facility: CLINIC | Age: 73
End: 2023-04-19
Payer: MEDICARE

## 2023-04-19 VITALS — WEIGHT: 229.28 LBS | HEART RATE: 79 BPM | OXYGEN SATURATION: 97 % | BODY MASS INDEX: 45.01 KG/M2 | HEIGHT: 60 IN

## 2023-04-19 DIAGNOSIS — Z47.1 AFTERCARE FOLLOWING LEFT KNEE JOINT REPLACEMENT SURGERY: ICD-10-CM

## 2023-04-19 DIAGNOSIS — M25.562 LEFT KNEE PAIN, UNSPECIFIED CHRONICITY: Primary | ICD-10-CM

## 2023-04-19 DIAGNOSIS — Z96.652 AFTERCARE FOLLOWING LEFT KNEE JOINT REPLACEMENT SURGERY: ICD-10-CM

## 2023-04-19 NOTE — PROGRESS NOTES
"Chief Complaint  Pain and Follow-up of the Left Knee    Subjective      Kim Owen presents to [facility] for follow-up of left total knee arthroplasty with Josi robot performed on 1/10/2023 by Dr. TEVIN Ruano.  She presents independently ambulatory without use of assistive device.  States that her knee is \"okay\".  Reports that \"my muscles hurt when I tried to get out of chairs\".  She does report that she was discharged from outpatient physical therapy approximately 3 weeks ago and this has provided significant improvement.  She has been exercising regularly, including with regular use of the elliptical.  She reports that it \"feels strange walking without any pain\".    Objective   No Known Allergies    Vital Signs:   Pulse 79   Ht 152.4 cm (60\")   Wt 104 kg (229 lb 4.5 oz)   SpO2 97%   BMI 44.78 kg/m²       Physical Exam    Constitutional: Awake, alert. Well nourished appearance.    Integumentary: Warm, dry, intact. No obvious rashes.    HENT: Atraumatic, normocephalic.   Respiratory: Non labored respirations .   Cardiovascular: Intact peripheral pulses.    Psychiatric: Normal mood and affect. A&O X3    Ortho Exam  Left knee: Skin is warm, dry, and intact.  Well-healed surgical scar noted.  Patella is well tracking and is stable to varus and valgus stress.  Full knee extension and flexion 125 degrees.  Full plantarflexion and dorsiflexion of the ankle.  Sensation intact light touch.  Distal neurovascular intact.  Smooth sit to stand transition.  Patient is fully weightbearing with nonantalgic gait.    Imaging Results (Most Recent)     Procedure Component Value Units Date/Time    XR Knee 3 View Left [[id number]] Resulted: 04/19/23 1234     Updated: 04/19/23 1235    Narrative:      X-Ray Report:  Study: X-rays ordered, taken in the office, and reviewed today.   Site: Left knee Xray  Indication: TKA  View: AP/Lateral, Standing and Sunrise view(s)  Findings: Intact left total knee " arthroplasty. No evidence of hardware   malfunction.   Prior studies available for comparison: yes                       Assessment and Plan   Problem List Items Addressed This Visit    None  Visit Diagnoses     Left knee pain, unspecified chronicity    -  Primary    Relevant Orders    XR Knee 3 View Left (Completed)    Aftercare following left knee joint replacement surgery              Follow Up   Return in about 9 months (around 1/19/2024).  Patient is a non-smoker.  Did not discuss options for smoking cessation.    Patient Instructions   X-rays reviewed, showing hardware intact. Continue home exercise program to progress strength and ROM. Continue icing knee as needed up to 3-4 times daily for no longer than 15 to 20 minutes at a time.    Continue with lifelong antibiotic prophylaxis with dental procedures following total joint replacement.    Follow-up in 9 months. Call sooner, if needed with any changes or concerns. Repeat x-rays.       Patient was given instructions and counseling regarding her condition or for health maintenance advice. Please see specific information pulled into the AVS if appropriate.

## 2023-04-24 ENCOUNTER — TELEPHONE (OUTPATIENT)
Dept: FAMILY MEDICINE CLINIC | Age: 73
End: 2023-04-24

## 2023-04-24 RX ORDER — AMOXICILLIN 500 MG/1
TABLET, FILM COATED ORAL
Qty: 4 TABLET | Refills: 0 | Status: SHIPPED | OUTPATIENT
Start: 2023-04-24

## 2023-04-24 NOTE — TELEPHONE ENCOUNTER
Caller: BraydenKim    Relationship: Self    Best call back number: [phone]    What medication are you requesting: ANTIBIOTICS    What are your current symptoms: DENTAL CLEANING    Have you had these symptoms before:    [] Yes  [x] No    Have you been treated for these symptoms before:   [] Yes  [x] No    If a prescription is needed, what is your preferred pharmacy and phone number:  [facility] [address] [place] [address] - [address] [facility] - [phone] [facility] [phone]   [phone]    Additional notes: PATIENT WAS ADVISED TO GET ANTIBIOTICS BEFORE HER UPCOMING TEETH CLEANING AT THE DENTIST.

## 2023-05-08 ENCOUNTER — OFFICE VISIT (OUTPATIENT)
Dept: FAMILY MEDICINE CLINIC | Age: 73
End: 2023-05-08
Payer: MEDICARE

## 2023-05-08 ENCOUNTER — HOSPITAL ENCOUNTER (OUTPATIENT)
Dept: GENERAL RADIOLOGY | Facility: HOSPITAL | Age: 73
Discharge: HOME | End: 2023-05-08
Admitting: PHYSICIAN ASSISTANT
Payer: MEDICARE

## 2023-05-08 VITALS
TEMPERATURE: 98.1 F | SYSTOLIC BLOOD PRESSURE: 121 MMHG | BODY MASS INDEX: 44.33 KG/M2 | WEIGHT: 225.8 LBS | DIASTOLIC BLOOD PRESSURE: 82 MMHG | HEIGHT: 60 IN | OXYGEN SATURATION: 98 % | HEART RATE: 75 BPM

## 2023-05-08 DIAGNOSIS — M25.611 DECREASED RANGE OF MOTION OF RIGHT SHOULDER: ICD-10-CM

## 2023-05-08 DIAGNOSIS — M25.511 ACUTE PAIN OF RIGHT SHOULDER: ICD-10-CM

## 2023-05-08 DIAGNOSIS — M25.611 DECREASED RANGE OF MOTION OF RIGHT SHOULDER: Primary | ICD-10-CM

## 2023-05-08 PROCEDURE — 73030 X-RAY EXAM OF SHOULDER: CPT

## 2023-05-08 NOTE — PROGRESS NOTES
Subjective     CHIEF COMPLAINT    Chief Complaint   Patient presents with   • Fall     Pt fell on 5/5, (R) arm & shoulder pain.             History of Present Illness  This is a 72-year-old female presenting to the clinic complaining of right shoulder pain for the last 3 days.  She reports she was trying to put on her rubber boots to go fishing with her  when she tripped and fell.  When she fell she landed on her knees and then fell onto her right shoulder.  She does not have any pain in her knees today.  Her shoulder pain is with movements and she has noticed she is unable to lift her arm above her head.  She has not had any history of shoulder pain but has seen  Been with [facility] previously for her knees.             Review of Systems   Constitutional: Negative for chills and fever.   Musculoskeletal: Positive for arthralgias (right shoulder). Negative for gait problem, joint swelling and myalgias.   Skin: Negative for wound.   Neurological: Negative for weakness and numbness.            Past Medical History:   Diagnosis Date   • Anesthesia     STATES CAN BE SLOW TO WAKE UP AT TIMES   • Chronic pain     BACK   • Diabetes     DOES NOT CHECK BS DAILY   • Essential (primary) hypertension    • Hyperlipidemia, unspecified    • Hypothyroidism, unspecified    • Low back pain    • Major depressive disorder, recurrent, moderate    • Other long term (current) drug therapy    • Other specified disorders of bone density and structure, unspecified site    • Primary generalized (osteo)arthritis     L KNEE   • Spinal stenosis, lumbar region without neurogenic claudication    • Vitamin D deficiency, unspecified    • Zoster without complications             Past Surgical History:   Procedure Laterality Date   • COLONOSCOPY  2012   • HYSTERECTOMY      AGE 48  ENDOMETRIOSIS, MENORRHAGIA   • JOINT REPLACEMENT Right 2016    KNEE   • SPINE SURGERY      L5-S1    DR. DEAN HONEYCUTT  5/21   • TOTAL KNEE ARTHROPLASTY Left  1/10/2023    Procedure: LEFT TOTAL KNEE ARTHROPLASTY WITH JANET ROBOT;  Surgeon: Lexa Ruano MD;  [facility];  Service: Orthopedics;  Laterality: Left;            Family History   Problem Relation Age of Onset   • Diabetes type II Father    • Alcohol abuse Father    • Malig Hyperthermia Neg Hx             Social History     Socioeconomic History   • Marital status:    Tobacco Use   • Smoking status: Never   • Smokeless tobacco: Never   Vaping Use   • Vaping Use: Never used   Substance and Sexual Activity   • Alcohol use: Not Currently   • Drug use: Never   • Sexual activity: Defer            No Known Allergies         Current Outpatient Medications on File Prior to Visit   Medication Sig Dispense Refill   • atorvastatin (LIPITOR) 10 MG tablet Take 1 tablet by mouth every night at bedtime. 90 tablet 1   • buPROPion (WELLBUTRIN) 100 MG tablet Take 1 tablet by mouth Daily. (Patient taking differently: Take 1 tablet by mouth Every Night.) 90 tablet 1   • chlorthalidone (HYGROTON) 25 MG tablet Take 1 tablet by mouth Daily. (Patient taking differently: Take 1 tablet by mouth Every Night.) 90 tablet 1   • ibuprofen (ADVIL,MOTRIN) 800 MG tablet TAKE 1 TABLET BY MOUTH AS NEEDED FOR MODERATE PAIN 90 tablet 0   • levothyroxine (SYNTHROID, LEVOTHROID) 25 MCG tablet Take 1 tablet by mouth Daily. 90 tablet 1   • losartan (COZAAR) 100 MG tablet Take 1 tablet by mouth Daily. 90 tablet 1   • metFORMIN ER (GLUCOPHAGE-XR) 500 MG 24 hr tablet Take 1 tablet by mouth 2 (Two) Times a Day for 180 days. (Patient taking differently: Take 1 tablet by mouth 2 (Two) Times a Day. INSTRUCTED PER ANESTHESIA PROTOCOL) 180 tablet 1   • metoprolol succinate XL (TOPROL-XL) 25 MG 24 hr tablet Take 1 tablet by mouth Daily. (Patient taking differently: Take 1 tablet by mouth Every Night.) 90 tablet 1   • [DISCONTINUED] amoxicillin (AMOXIL) 500 MG tablet Take 2000 mg 1 hour prior to procedure (Patient not taking: Reported on  "5/8/2023) 4 tablet 0   • [DISCONTINUED] HYDROcodone-acetaminophen (NORCO) 5-325 MG per tablet Take 1 tablet by mouth Every 6 (Six) Hours As Needed for Moderate Pain. (Patient not taking: Reported on 5/8/2023) 28 tablet 0     No current facility-administered medications on file prior to visit.            /82 (BP Location: Left arm, Patient Position: Sitting, Cuff Size: Large Adult)   Pulse 75   Temp 98.1 °F (36.7 °C) (Oral)   Ht 152.4 cm (60\")   Wt 102 kg (225 lb 12.8 oz)   SpO2 98% Comment: room air  BMI 44.10 kg/m²          Objective     Physical Exam  Vitals and nursing note reviewed.   Constitutional:       General: She is not in acute distress.     Appearance: Normal appearance.   Pulmonary:      Effort: Pulmonary effort is normal. No respiratory distress.   Musculoskeletal:      Right shoulder: No swelling, deformity or tenderness. Decreased range of motion (PROM > AROM). Normal strength.      Right upper arm: No swelling or tenderness.      Right elbow: No swelling or deformity. No tenderness.   Skin:     General: Skin is warm and dry.      Findings: No bruising or erythema.   Neurological:      Mental Status: She is alert and oriented to person, place, and time.      Sensory: No sensory deficit (Bilateral upper extremity).   Psychiatric:         Mood and Affect: Mood normal.         Behavior: Behavior normal.              Procedures                    Lab Results (last 24 hours)     ** No results found for the last 24 hours. **                XR Shoulder 2+ View Right    Result Date: 5/8/2023  PROCEDURE: XR SHOULDER 2+ VW RIGHT  COMPARISON: None  INDICATIONS: GENERAL RIGHT SHOULDER PAIN / LIMITED RANGE OF MOTION AFTER FALL X 3 DAYS  FINDINGS:  Moderate degenerative changes of the right shoulder with osteophytosis and joint space narrowing.  There is also mild acromioclavicular joint degenerative change.  No acute fracture or dislocation.  Bone mineralization is normal.        1. Moderate " osteoarthritis of the right shoulder.      GALO BROWN MD       Electronically Signed and Approved By: GALO BROWN MD on 5/08/2023 at 13:26                        Kim has no fractures visible on x-ray.  However, given her decreased range of motion I am concerned about a potential rotator cuff injury.  I have placed a referral for her to see orthopedic surgery and physical therapy for further management.  Sling provided today in the office for comfort.  She was instructed to take Tylenol and ibuprofen to help with her pain.       Diagnoses and all orders for this visit:    1. Decreased range of motion of right shoulder (Primary)  -     XR Shoulder 2+ View Right; Future  -     Ambulatory Referral to Orthopedic Surgery  -     Ambulatory Referral to Physical Therapy Evaluate and treat  -     Shoulder & Arm Sling    2. Acute pain of right shoulder  -     XR Shoulder 2+ View Right; Future  -     Ambulatory Referral to Orthopedic Surgery  -     Ambulatory Referral to Physical Therapy Evaluate and treat  -     Shoulder & Arm Sling                           FOR FULL DISCHARGE INSTRUCTIONS/COMMENTS/HANDOUTS please see the   AVS

## 2023-05-18 DIAGNOSIS — E78.2 MIXED HYPERLIPIDEMIA: ICD-10-CM

## 2023-05-18 RX ORDER — ATORVASTATIN CALCIUM 10 MG/1
10 TABLET, FILM COATED ORAL
Qty: 90 TABLET | Refills: 1 | OUTPATIENT
Start: 2023-05-18

## 2023-05-19 DIAGNOSIS — Z79.899 OTHER LONG TERM (CURRENT) DRUG THERAPY: ICD-10-CM

## 2023-05-22 RX ORDER — CYCLOBENZAPRINE HCL 10 MG
TABLET ORAL
Qty: 90 TABLET | Refills: 0 | OUTPATIENT
Start: 2023-05-22

## 2023-06-14 ENCOUNTER — LAB (OUTPATIENT)
Dept: LAB | Facility: HOSPITAL | Age: 73
End: 2023-06-14
Payer: MEDICARE

## 2023-06-14 ENCOUNTER — TELEPHONE (OUTPATIENT)
Dept: ORTHOPEDIC SURGERY | Facility: CLINIC | Age: 73
End: 2023-06-14
Payer: MEDICARE

## 2023-06-14 ENCOUNTER — HOSPITAL ENCOUNTER (OUTPATIENT)
Dept: MRI IMAGING | Facility: HOSPITAL | Age: 73
Discharge: HOME | End: 2023-06-14
Payer: MEDICARE

## 2023-06-14 ENCOUNTER — OFFICE VISIT (OUTPATIENT)
Dept: FAMILY MEDICINE CLINIC | Age: 73
End: 2023-06-14
Payer: MEDICARE

## 2023-06-14 VITALS
OXYGEN SATURATION: 95 % | DIASTOLIC BLOOD PRESSURE: 70 MMHG | SYSTOLIC BLOOD PRESSURE: 136 MMHG | HEART RATE: 73 BPM | TEMPERATURE: 98 F | BODY MASS INDEX: 43.98 KG/M2 | WEIGHT: 224 LBS | HEIGHT: 60 IN

## 2023-06-14 DIAGNOSIS — E78.2 MIXED HYPERLIPIDEMIA: ICD-10-CM

## 2023-06-14 DIAGNOSIS — F33.1 MAJOR DEPRESSIVE DISORDER, RECURRENT, MODERATE: ICD-10-CM

## 2023-06-14 DIAGNOSIS — E11.9 TYPE 2 DIABETES MELLITUS WITHOUT COMPLICATION, WITHOUT LONG-TERM CURRENT USE OF INSULIN: ICD-10-CM

## 2023-06-14 DIAGNOSIS — M75.101 TEAR OF RIGHT ROTATOR CUFF, UNSPECIFIED TEAR EXTENT, UNSPECIFIED WHETHER TRAUMATIC: Primary | ICD-10-CM

## 2023-06-14 DIAGNOSIS — M25.611 DECREASED RANGE OF MOTION OF RIGHT SHOULDER: ICD-10-CM

## 2023-06-14 DIAGNOSIS — E03.9 HYPOTHYROIDISM, UNSPECIFIED TYPE: ICD-10-CM

## 2023-06-14 DIAGNOSIS — I10 ESSENTIAL (PRIMARY) HYPERTENSION: ICD-10-CM

## 2023-06-14 LAB
ALBUMIN SERPL-MCNC: 4.4 G/DL (ref 3.5–5.2)
ALBUMIN UR-MCNC: <1.2 MG/DL
ALBUMIN/GLOB SERPL: 1.4 G/DL
ALP SERPL-CCNC: 71 U/L (ref 39–117)
ALT SERPL W P-5'-P-CCNC: 21 U/L (ref 1–33)
ANION GAP SERPL CALCULATED.3IONS-SCNC: 12.2 MMOL/L (ref 5–15)
AST SERPL-CCNC: 15 U/L (ref 1–32)
BILIRUB SERPL-MCNC: 0.4 MG/DL (ref 0–1.2)
BUN SERPL-MCNC: 19 MG/DL (ref 8–23)
BUN/CREAT SERPL: 18.4 (ref 7–25)
CALCIUM SPEC-SCNC: 10.6 MG/DL (ref 8.6–10.5)
CHLORIDE SERPL-SCNC: 96 MMOL/L (ref 98–107)
CHOLEST SERPL-MCNC: 145 MG/DL (ref 0–200)
CO2 SERPL-SCNC: 28.8 MMOL/L (ref 22–29)
CREAT SERPL-MCNC: 1.03 MG/DL (ref 0.57–1)
CREAT UR-MCNC: 43.7 MG/DL
DEPRECATED RDW RBC AUTO: 43.3 FL (ref 37–54)
EGFRCR SERPLBLD CKD-EPI 2021: 58.3 ML/MIN/1.73
ERYTHROCYTE [DISTWIDTH] IN BLOOD BY AUTOMATED COUNT: 13.9 % (ref 12.3–15.4)
GLOBULIN UR ELPH-MCNC: 3.1 GM/DL
GLUCOSE SERPL-MCNC: 141 MG/DL (ref 65–99)
HBA1C MFR BLD: 6.6 % (ref 4.8–5.6)
HCT VFR BLD AUTO: 40.4 % (ref 34–46.6)
HDLC SERPL-MCNC: 39 MG/DL (ref 40–60)
HGB BLD-MCNC: 13.9 G/DL (ref 12–15.9)
LDLC SERPL CALC-MCNC: 73 MG/DL (ref 0–100)
LDLC/HDLC SERPL: 1.72 {RATIO}
MCH RBC QN AUTO: 29.2 PG (ref 26.6–33)
MCHC RBC AUTO-ENTMCNC: 34.4 G/DL (ref 31.5–35.7)
MCV RBC AUTO: 84.9 FL (ref 79–97)
MICROALBUMIN/CREAT UR: NORMAL MG/G{CREAT}
PLATELET # BLD AUTO: 272 10*3/MM3 (ref 140–450)
PMV BLD AUTO: 10.2 FL (ref 6–12)
POTASSIUM SERPL-SCNC: 3.6 MMOL/L (ref 3.5–5.2)
PROT SERPL-MCNC: 7.5 G/DL (ref 6–8.5)
RBC # BLD AUTO: 4.76 10*6/MM3 (ref 3.77–5.28)
SODIUM SERPL-SCNC: 137 MMOL/L (ref 136–145)
TRIGL SERPL-MCNC: 195 MG/DL (ref 0–150)
TSH SERPL DL<=0.05 MIU/L-ACNC: 2.07 UIU/ML (ref 0.27–4.2)
VLDLC SERPL-MCNC: 33 MG/DL (ref 5–40)
WBC NRBC COR # BLD: 6.3 10*3/MM3 (ref 3.4–10.8)

## 2023-06-14 PROCEDURE — 82043 UR ALBUMIN QUANTITATIVE: CPT

## 2023-06-14 PROCEDURE — 36415 COLL VENOUS BLD VENIPUNCTURE: CPT

## 2023-06-14 PROCEDURE — 84443 ASSAY THYROID STIM HORMONE: CPT

## 2023-06-14 PROCEDURE — 80053 COMPREHEN METABOLIC PANEL: CPT

## 2023-06-14 PROCEDURE — 83036 HEMOGLOBIN GLYCOSYLATED A1C: CPT

## 2023-06-14 PROCEDURE — 82570 ASSAY OF URINE CREATININE: CPT

## 2023-06-14 PROCEDURE — 85027 COMPLETE CBC AUTOMATED: CPT

## 2023-06-14 PROCEDURE — 80061 LIPID PANEL: CPT

## 2023-06-14 PROCEDURE — 73221 MRI JOINT UPR EXTREM W/O DYE: CPT

## 2023-06-14 RX ORDER — LEVOTHYROXINE SODIUM 0.03 MG/1
25 TABLET ORAL DAILY
Qty: 90 TABLET | Refills: 1 | Status: SHIPPED | OUTPATIENT
Start: 2023-06-14

## 2023-06-14 RX ORDER — METFORMIN HYDROCHLORIDE 500 MG/1
500 TABLET, EXTENDED RELEASE ORAL 2 TIMES DAILY
Qty: 180 TABLET | Refills: 1 | Status: SHIPPED | OUTPATIENT
Start: 2023-06-14 | End: 2023-12-11

## 2023-06-14 RX ORDER — CHLORTHALIDONE 25 MG/1
25 TABLET ORAL DAILY
Qty: 90 TABLET | Refills: 1 | Status: SHIPPED | OUTPATIENT
Start: 2023-06-14

## 2023-06-14 RX ORDER — METOPROLOL SUCCINATE 25 MG/1
25 TABLET, EXTENDED RELEASE ORAL DAILY
Qty: 90 TABLET | Refills: 1 | Status: SHIPPED | OUTPATIENT
Start: 2023-06-14

## 2023-06-14 RX ORDER — BUPROPION HYDROCHLORIDE 100 MG/1
100 TABLET ORAL DAILY
Qty: 90 TABLET | Refills: 1 | Status: SHIPPED | OUTPATIENT
Start: 2023-06-14

## 2023-06-14 RX ORDER — ATORVASTATIN CALCIUM 10 MG/1
10 TABLET, FILM COATED ORAL
Qty: 90 TABLET | Refills: 1 | Status: SHIPPED | OUTPATIENT
Start: 2023-06-14

## 2023-06-14 RX ORDER — MELOXICAM 15 MG/1
15 TABLET ORAL AS NEEDED
COMMUNITY

## 2023-06-14 RX ORDER — LOSARTAN POTASSIUM 100 MG/1
100 TABLET ORAL DAILY
Qty: 90 TABLET | Refills: 1 | Status: SHIPPED | OUTPATIENT
Start: 2023-06-14

## 2023-06-14 NOTE — PROGRESS NOTES
Chief Complaint  Kim Owen presents to [facility] for Hypertension    Subjective          History of Present Illness    Kim Owen is here today for follow up on HTN, DM, hypothyroidism, OA.  She is on losartan 100 mg daily, chlorthalidone 25 mg daily, Toprol XL 25 mg daily for hypertension.  On atorvastatin for hyperlipidemia.   Taking Metformin for diabetes. This was diagnosed last year. Had previously been treated for prediabetes. Last A1C 6.3. Previously declined dietician/diabetes educator. On levothyroxine for hypothyroidism.  Osteopenia on bone density scan. Was previously taking calcium but was taken off by previous provider. She reports getting plenty of calcium in diet. Vitamin D3 42586 daily. Also takes MVI.   Taking Wellbutrin for depression. Notes doing well.  C/o right shoulder after fall beginning of May 2023. She had xray that was normal except for moderate osteoarthritis. She has been going to physical therapy without much improvement. She reports that she was previously told by xray technician that she did not need to see ortho so she declined to schedule with ortho when referrals called her. She reports that she continues to have limited ROM in right shoulder and discomfort. Was advised by PT that she may need to have additional imaging. She has been taking Ibuprofen for discomfort. Also has some hydrocodone that was left over from surgery that she has taken a couple of times.     Review of Systems      No Known Allergies   Past Medical History:   Diagnosis Date    Anesthesia     STATES CAN BE SLOW TO WAKE UP AT TIMES    Chronic pain     BACK    Diabetes     DOES NOT CHECK BS DAILY    Essential (primary) hypertension     Hyperlipidemia, unspecified     Hypothyroidism, unspecified     Low back pain     Major depressive disorder, recurrent, moderate     Other long term (current) drug therapy     Other specified disorders of bone density and structure,  unspecified site     Primary generalized (osteo)arthritis     L KNEE    Spinal stenosis, lumbar region without neurogenic claudication     Vitamin D deficiency, unspecified     Zoster without complications      Current Outpatient Medications   Medication Sig Dispense Refill    atorvastatin (LIPITOR) 10 MG tablet Take 1 tablet by mouth every night at bedtime. 90 tablet 1    buPROPion (WELLBUTRIN) 100 MG tablet Take 1 tablet by mouth Daily. 90 tablet 1    chlorthalidone (HYGROTON) 25 MG tablet Take 1 tablet by mouth Daily. 90 tablet 1    ibuprofen (ADVIL,MOTRIN) 800 MG tablet TAKE 1 TABLET BY MOUTH AS NEEDED FOR MODERATE PAIN 90 tablet 0    levothyroxine (SYNTHROID, LEVOTHROID) 25 MCG tablet Take 1 tablet by mouth Daily. 90 tablet 1    losartan (COZAAR) 100 MG tablet Take 1 tablet by mouth Daily. 90 tablet 1    meloxicam (MOBIC) 15 MG tablet Take 1 tablet by mouth As Needed.      metFORMIN ER (GLUCOPHAGE-XR) 500 MG 24 hr tablet Take 1 tablet by mouth 2 (Two) Times a Day for 180 days. 180 tablet 1    metoprolol succinate XL (TOPROL-XL) 25 MG 24 hr tablet Take 1 tablet by mouth Daily. 90 tablet 1     No current facility-administered medications for this visit.     Past Surgical History:   Procedure Laterality Date    COLONOSCOPY  2012    HYSTERECTOMY      AGE 48  ENDOMETRIOSIS, MENORRHAGIA    JOINT REPLACEMENT Right 2016    KNEE    SPINE SURGERY      L5-S1    DR. DEAN HONEYCUTT  5/21    TOTAL KNEE ARTHROPLASTY Left 1/10/2023    Procedure: LEFT TOTAL KNEE ARTHROPLASTY WITH JANET ROBOT;  Surgeon: Lexa Ruano MD;  Location: [facility];  Service: Orthopedics;  Laterality: Left;      Social History     Tobacco Use    Smoking status: Never    Smokeless tobacco: Never   Vaping Use    Vaping Use: Never used   Substance Use Topics    Alcohol use: Not Currently    Drug use: Never     Family History   Problem Relation Age of Onset    Diabetes type II Father     Alcohol abuse Father     Malig Hyperthermia [facility]  "Maintenance Due   Topic Date Due    DIABETIC FOOT EXAM  Never done    DIABETIC EYE EXAM  12/02/2021        Immunization History   Administered Date(s) Administered    COVID-19 (PFIZER) BIVALENT 12+YRS 12/14/2022    COVID-19 (PFIZER) Purple Cap Monovalent 03/15/2021, 04/05/2021, 12/02/2021    Covid-19 (Pfizer) Gray Cap Monovalent 07/19/2022    Fluzone High-Dose 65+yrs 10/18/2021, 10/31/2022    Influenza, Unspecified 12/02/2020    Pneumococcal Conjugate 20-Valent (PCV20) 07/19/2022    Pneumococcal Polysaccharide (PPSV23) 12/02/2020        Objective     Vitals:    06/14/23 0808   BP: 136/70   BP Location: Left arm   Patient Position: Sitting   Cuff Size: Large Adult   Pulse: 73   Temp: 98 °F (36.7 °C)   TempSrc: Oral   SpO2: 95%   Weight: 102 kg (224 lb)   Height: 152.4 cm (60\")     Body mass index is 43.75 kg/m².     Physical Exam  Vitals reviewed.   Constitutional:       General: She is not in acute distress.     Appearance: Normal appearance. She is well-developed.   HENT:      Head: Normocephalic and atraumatic.   Cardiovascular:      Rate and Rhythm: Normal rate and regular rhythm.   Pulmonary:      Effort: Pulmonary effort is normal.      Breath sounds: Normal breath sounds.   Musculoskeletal:      Comments: Limited ROM right shoulder, discomfort with raising right arm   Neurological:      Mental Status: She is alert and oriented to person, place, and time.   Psychiatric:         Mood and Affect: Mood and affect normal.         Result Review :     MRI Shoulder Right Without Contrast (06/14/2023 10:40)                           Assessment and Plan      Diagnoses and all orders for this visit:    1. Tear of right rotator cuff, unspecified tear extent, unspecified whether traumatic (Primary)  Comments:  Will get her in with ortho for additional eval  Orders:  -     Ambulatory Referral to Orthopedic Surgery  -     MRI Shoulder Right Without Contrast; Future    2. Essential (primary) hypertension  Comments:  Medical " condition is stable.  Continue same therapy.  Will recheck at next regular appointment.  Orders:  -     losartan (COZAAR) 100 MG tablet; Take 1 tablet by mouth Daily.  Dispense: 90 tablet; Refill: 1  -     chlorthalidone (HYGROTON) 25 MG tablet; Take 1 tablet by mouth Daily.  Dispense: 90 tablet; Refill: 1  -     metoprolol succinate XL (TOPROL-XL) 25 MG 24 hr tablet; Take 1 tablet by mouth Daily.  Dispense: 90 tablet; Refill: 1    3. Hypothyroidism, unspecified type  Comments:  Medical condition is stable.  Continue same therapy.  Will recheck at next regular appointment.  Orders:  -     levothyroxine (SYNTHROID, LEVOTHROID) 25 MCG tablet; Take 1 tablet by mouth Daily.  Dispense: 90 tablet; Refill: 1  -     TSH; Future    4. Major depressive disorder, recurrent, moderate  Comments:  Medical condition is stable.  Continue same therapy.  Will recheck at next regular appointment.  Orders:  -     buPROPion (WELLBUTRIN) 100 MG tablet; Take 1 tablet by mouth Daily.  Dispense: 90 tablet; Refill: 1    5. Mixed hyperlipidemia  Comments:  Medical condition is stable.  Continue same therapy.  Will recheck at next regular appointment.  Orders:  -     atorvastatin (LIPITOR) 10 MG tablet; Take 1 tablet by mouth every night at bedtime.  Dispense: 90 tablet; Refill: 1    6. Type 2 diabetes mellitus without complication, without long-term current use of insulin  Comments:  Medical condition is stable.  Continue same therapy.  Will recheck at next regular appointment  Orders:  -     metFORMIN ER (GLUCOPHAGE-XR) 500 MG 24 hr tablet; Take 1 tablet by mouth 2 (Two) Times a Day for 180 days.  Dispense: 180 tablet; Refill: 1  -     Comprehensive metabolic panel; Future  -     Hemoglobin A1c; Future  -     Lipid panel; Future  -     Microalbumin / Creatinine Urine Ratio - Urine, Clean Catch; Future  -     CBC No Differential; Future              Follow Up     Return in about 6 months (around 12/14/2023) for Medicare Wellness.

## 2023-06-14 NOTE — TELEPHONE ENCOUNTER
PATIENT HAD MRI DONE TODAY 06-14-23 REFERRAL SAID TO [facility] WITH  BEEN FIRST OPENING BEFORE THE MRI WAS DONE. PATIENT HAS MULTIPLE TEARS OF THE RT SHOULDER. PLEASE ADVISE ON SCHEDULING.

## 2023-06-30 PROBLEM — M12.811 ROTATOR CUFF ARTHROPATHY OF RIGHT SHOULDER: Status: ACTIVE | Noted: 2023-06-30

## 2023-07-03 PROBLEM — M75.101 TEAR OF RIGHT ROTATOR CUFF: Status: ACTIVE | Noted: 2023-07-03

## 2023-07-21 ENCOUNTER — PRE-ADMISSION TESTING (OUTPATIENT)
Dept: PREADMISSION TESTING | Facility: HOSPITAL | Age: 73
End: 2023-07-21
Payer: MEDICARE

## 2023-07-21 VITALS
OXYGEN SATURATION: 94 % | DIASTOLIC BLOOD PRESSURE: 82 MMHG | BODY MASS INDEX: 43.8 KG/M2 | SYSTOLIC BLOOD PRESSURE: 148 MMHG | RESPIRATION RATE: 16 BRPM | TEMPERATURE: 97.7 F | WEIGHT: 223.11 LBS | HEIGHT: 60 IN | HEART RATE: 82 BPM

## 2023-07-21 DIAGNOSIS — M75.101 TEAR OF RIGHT ROTATOR CUFF, UNSPECIFIED TEAR EXTENT, UNSPECIFIED WHETHER TRAUMATIC: ICD-10-CM

## 2023-07-21 LAB
ALBUMIN SERPL-MCNC: 4.3 G/DL (ref 3.5–5.2)
ALBUMIN/GLOB SERPL: 1.2 G/DL
ALP SERPL-CCNC: 73 U/L (ref 39–117)
ALT SERPL W P-5'-P-CCNC: 21 U/L (ref 1–33)
ANION GAP SERPL CALCULATED.3IONS-SCNC: 15.3 MMOL/L (ref 5–15)
AST SERPL-CCNC: 19 U/L (ref 1–32)
BASOPHILS # BLD AUTO: 0.05 10*3/MM3 (ref 0–0.2)
BASOPHILS NFR BLD AUTO: 0.7 % (ref 0–1.5)
BILIRUB SERPL-MCNC: 0.4 MG/DL (ref 0–1.2)
BUN SERPL-MCNC: 15 MG/DL (ref 8–23)
BUN/CREAT SERPL: 15.6 (ref 7–25)
CALCIUM SPEC-SCNC: 10.1 MG/DL (ref 8.6–10.5)
CHLORIDE SERPL-SCNC: 96 MMOL/L (ref 98–107)
CO2 SERPL-SCNC: 24.7 MMOL/L (ref 22–29)
CREAT SERPL-MCNC: 0.96 MG/DL (ref 0.57–1)
DEPRECATED RDW RBC AUTO: 42.6 FL (ref 37–54)
EGFRCR SERPLBLD CKD-EPI 2021: 63.4 ML/MIN/1.73
EOSINOPHIL # BLD AUTO: 0.54 10*3/MM3 (ref 0–0.4)
EOSINOPHIL NFR BLD AUTO: 7.4 % (ref 0.3–6.2)
ERYTHROCYTE [DISTWIDTH] IN BLOOD BY AUTOMATED COUNT: 13.8 % (ref 12.3–15.4)
GLOBULIN UR ELPH-MCNC: 3.5 GM/DL
GLUCOSE SERPL-MCNC: 175 MG/DL (ref 65–99)
HBA1C MFR BLD: 6.4 % (ref 4.8–5.6)
HCT VFR BLD AUTO: 37.9 % (ref 34–46.6)
HGB BLD-MCNC: 13.5 G/DL (ref 12–15.9)
IMM GRANULOCYTES # BLD AUTO: 0.02 10*3/MM3 (ref 0–0.05)
IMM GRANULOCYTES NFR BLD AUTO: 0.3 % (ref 0–0.5)
INR PPP: 1.04 (ref 0.86–1.15)
LYMPHOCYTES # BLD AUTO: 2.04 10*3/MM3 (ref 0.7–3.1)
LYMPHOCYTES NFR BLD AUTO: 27.8 % (ref 19.6–45.3)
MCH RBC QN AUTO: 30.3 PG (ref 26.6–33)
MCHC RBC AUTO-ENTMCNC: 35.6 G/DL (ref 31.5–35.7)
MCV RBC AUTO: 85 FL (ref 79–97)
MONOCYTES # BLD AUTO: 0.71 10*3/MM3 (ref 0.1–0.9)
MONOCYTES NFR BLD AUTO: 9.7 % (ref 5–12)
NEUTROPHILS NFR BLD AUTO: 3.98 10*3/MM3 (ref 1.7–7)
NEUTROPHILS NFR BLD AUTO: 54.1 % (ref 42.7–76)
NRBC BLD AUTO-RTO: 0 /100 WBC (ref 0–0.2)
PLATELET # BLD AUTO: 284 10*3/MM3 (ref 140–450)
PMV BLD AUTO: 10 FL (ref 6–12)
POTASSIUM SERPL-SCNC: 3.1 MMOL/L (ref 3.5–5.2)
PROT SERPL-MCNC: 7.8 G/DL (ref 6–8.5)
PROTHROMBIN TIME: 13.7 SECONDS (ref 11.8–14.9)
RBC # BLD AUTO: 4.46 10*6/MM3 (ref 3.77–5.28)
SODIUM SERPL-SCNC: 136 MMOL/L (ref 136–145)
WBC NRBC COR # BLD: 7.34 10*3/MM3 (ref 3.4–10.8)

## 2023-07-21 PROCEDURE — 80053 COMPREHEN METABOLIC PANEL: CPT

## 2023-07-21 PROCEDURE — 85025 COMPLETE CBC W/AUTO DIFF WBC: CPT

## 2023-07-21 PROCEDURE — 83036 HEMOGLOBIN GLYCOSYLATED A1C: CPT

## 2023-07-21 PROCEDURE — 85610 PROTHROMBIN TIME: CPT

## 2023-07-21 PROCEDURE — 36415 COLL VENOUS BLD VENIPUNCTURE: CPT

## 2023-07-21 NOTE — SIGNIFICANT NOTE
PT EDUCATED RE TOTAL JOINT PREPARATION, SURGICAL SOAP GIVEN AND OTHER EDUCATIONAL MATERIAL  PROVIDED UNDERSTANDING VERBALIZED.

## 2023-07-21 NOTE — DISCHARGE INSTRUCTIONS
IMPORTANT INSTRUCTIONS - PRE-ADMISSION TESTING  DO NOT EAT OR CHEW anything after midnight the night before your procedure.    You may have CLEAR liquids up to __3____ hours prior to ARRIVAL time. INCLUDES GATORADE, SUGAR FREE, 20 OZ, NO RED  Take the following medications the morning of your procedure with JUST A SIP OF WATER:  METOPROLOL, LEVOTHYROXINE, WELBUTRIN______________________________________________________________________________________________________________________________________________________METFORMIN: 7/26/23 LAST DOSE TAKE BEFORE 6 P.M. 7/27/23 DO NOT TAKE MORNING DOSE_________________________________    DO NOT BRING your medications to the hospital with you, UNLESS something has changed since your PRE-Admission Testing appointment.  Hold all vitamins, supplements, and NSAIDS (Non- steroidal anti-inflammatory meds) for one week prior to surgery (you MAY take Tylenol or Acetaminophen). CONTINUE TO HOLD IBUPROFEN NOW UNTIL AFTER SURGERY  If you are diabetic, check your blood sugar the morning of your procedure. If it is less than 70 or if you are feeling symptomatic, call the following number for further instructions: [phone]  SAME DAY WILL CALL ARRIVAL TIME 7/26/23 BY 4 P.M._______.  Use your inhalers/nebulizers as usual, the morning of your procedure. BRING YOUR INHALERS with you. NA  Bring your CPAP or BIPAP to hospital, ONLY IF YOU WILL BE SPENDING THE NIGHT. NA  Make sure you have a ride home and have someone who will stay with you the day of your procedure after you go home.  If you have any questions, please call your Pre-Admission Testing Nurse, __________JEANETTE______ at [phone]__________.   Per anesthesia request, do not smoke for 24 hours before your procedure or as instructed by your surgeon.  NA  SHOWER WITH SURGICAL SOAP AS DIRECTED    PREOPERATIVE (BEFORE SURGERY)              BATHING INSTRUCTIONS  Instructions:    You will need to shower  3 separate times utilizing the  soap provided; at the times indicated   below:     - PM  7/25/23  - AM AND PM 7/26/23       Wash your hair and face with normal shampoo and soap, rinse it well before using the surgical soap.      In the shower, wet the skin completely with water from your neck to your feet. Apply the cleanser to your   body ONLY FROM THE NECK TO YOUR FEET.     Do NOT USE THE CLEANSER ON YOUR FACE, HEAD, OR GENITAL (PRIVATE) AREAS.   Keep it out of your eyes, ears, and mouth because of the risk of injury to those areas.      Scrub with a clean washcloth for each bath utilizing the soap provided from the top of your body to the   bottom starting at the neck area.      Pay close attention to your armpits, groin area, and the site of surgery.      Wash your body gently for 5 minutes. Stand outside the stream or turn off the water while scrubbing your   body. Do NOT wash with your regular soap after the surgical cleanser is used.      RINSE THE CLEANSER OFF COMPLETELY with plenty of water. Rinse the area again thoroughly.      Dry off with a clean towel. The surgical soap can cause dryness; however do NOT APPLY LOTION,   CREAM, POWDER, and/or DEODORANT AFTER SHOWERING.     Be sure to where clean clothes after showering.      Ensure CLEAN BED LINENS AFTER FIRST wash with the surgical soap.      NO PETS ALLOWED IN THE BED with you after utilizing the surgical soap.

## 2023-07-25 ENCOUNTER — ANESTHESIA EVENT (OUTPATIENT)
Dept: PERIOP | Facility: HOSPITAL | Age: 73
End: 2023-07-25
Payer: MEDICARE

## 2023-07-27 ENCOUNTER — ANESTHESIA (OUTPATIENT)
Dept: PERIOP | Facility: HOSPITAL | Age: 73
End: 2023-07-27
Payer: MEDICARE

## 2023-07-27 ENCOUNTER — HOSPITAL ENCOUNTER (OUTPATIENT)
Facility: HOSPITAL | Age: 73
Discharge: HOME | End: 2023-07-28
Attending: ORTHOPAEDIC SURGERY | Admitting: ORTHOPAEDIC SURGERY
Payer: MEDICARE

## 2023-07-27 ENCOUNTER — APPOINTMENT (OUTPATIENT)
Dept: GENERAL RADIOLOGY | Facility: HOSPITAL | Age: 73
End: 2023-07-27
Payer: MEDICARE

## 2023-07-27 DIAGNOSIS — Z78.9 DECREASED ACTIVITIES OF DAILY LIVING (ADL): ICD-10-CM

## 2023-07-27 DIAGNOSIS — M75.101 TEAR OF RIGHT ROTATOR CUFF, UNSPECIFIED TEAR EXTENT, UNSPECIFIED WHETHER TRAUMATIC: ICD-10-CM

## 2023-07-27 DIAGNOSIS — R26.2 DIFFICULTY IN WALKING: Primary | ICD-10-CM

## 2023-07-27 DIAGNOSIS — M12.811 ROTATOR CUFF ARTHROPATHY OF RIGHT SHOULDER: ICD-10-CM

## 2023-07-27 LAB
GLUCOSE BLDC GLUCOMTR-MCNC: 122 MG/DL (ref 70–99)
GLUCOSE BLDC GLUCOMTR-MCNC: 183 MG/DL (ref 70–99)

## 2023-07-27 PROCEDURE — A9270 NON-COVERED ITEM OR SERVICE: HCPCS | Performed by: ORTHOPAEDIC SURGERY

## 2023-07-27 PROCEDURE — 63710000001 LEVOTHYROXINE 25 MCG TABLET: Performed by: INTERNAL MEDICINE

## 2023-07-27 PROCEDURE — 63710000001 ATORVASTATIN 10 MG TABLET: Performed by: INTERNAL MEDICINE

## 2023-07-27 PROCEDURE — 73020 X-RAY EXAM OF SHOULDER: CPT

## 2023-07-27 PROCEDURE — 25010000002 CEFAZOLIN IN DEXTROSE 2-4 GM/100ML-% SOLUTION: Performed by: ORTHOPAEDIC SURGERY

## 2023-07-27 PROCEDURE — 63710000001 METOPROLOL SUCCINATE XL 25 MG TABLET SUSTAINED-RELEASE 24 HOUR: Performed by: INTERNAL MEDICINE

## 2023-07-27 PROCEDURE — 63710000001 ACETAMINOPHEN 500 MG TABLET: Performed by: ANESTHESIOLOGY

## 2023-07-27 PROCEDURE — 25010000002 FENTANYL CITRATE (PF) 50 MCG/ML SOLUTION

## 2023-07-27 PROCEDURE — 25010000002 DEXAMETHASONE PER 1 MG

## 2023-07-27 PROCEDURE — A9270 NON-COVERED ITEM OR SERVICE: HCPCS | Performed by: INTERNAL MEDICINE

## 2023-07-27 PROCEDURE — 25010000002 CEFAZOLIN IN DEXTROSE 2000 MG/ 100 ML SOLUTION: Performed by: ORTHOPAEDIC SURGERY

## 2023-07-27 PROCEDURE — A9270 NON-COVERED ITEM OR SERVICE: HCPCS | Performed by: ANESTHESIOLOGY

## 2023-07-27 PROCEDURE — 23472 RECONSTRUCT SHOULDER JOINT: CPT | Performed by: ORTHOPAEDIC SURGERY

## 2023-07-27 PROCEDURE — 82948 REAGENT STRIP/BLOOD GLUCOSE: CPT

## 2023-07-27 PROCEDURE — 63710000001 FAMOTIDINE 20 MG TABLET: Performed by: ORTHOPAEDIC SURGERY

## 2023-07-27 PROCEDURE — 63710000001 CELECOXIB 100 MG CAPSULE: Performed by: ANESTHESIOLOGY

## 2023-07-27 PROCEDURE — 97161 PT EVAL LOW COMPLEX 20 MIN: CPT

## 2023-07-27 PROCEDURE — 25010000002 SUGAMMADEX 200 MG/2ML SOLUTION

## 2023-07-27 PROCEDURE — 63710000001 FERROUS SULFATE 325 (65 FE) MG TABLET: Performed by: ORTHOPAEDIC SURGERY

## 2023-07-27 PROCEDURE — S0260 H&P FOR SURGERY: HCPCS | Performed by: ORTHOPAEDIC SURGERY

## 2023-07-27 PROCEDURE — 25010000002 PROPOFOL 10 MG/ML EMULSION

## 2023-07-27 PROCEDURE — 94799 UNLISTED PULMONARY SVC/PX: CPT

## 2023-07-27 PROCEDURE — 63710000001 OXYCODONE-ACETAMINOPHEN 5-325 MG TABLET: Performed by: ORTHOPAEDIC SURGERY

## 2023-07-27 PROCEDURE — 25010000002 MIDAZOLAM PER 1MG: Performed by: ANESTHESIOLOGY

## 2023-07-27 PROCEDURE — 63710000001 BUPROPION 100 MG TABLET: Performed by: INTERNAL MEDICINE

## 2023-07-27 PROCEDURE — C1776 JOINT DEVICE (IMPLANTABLE): HCPCS | Performed by: ORTHOPAEDIC SURGERY

## 2023-07-27 DEVICE — SUT FW #2 W/TPR NDL 1/2 CIR 38IN 97CM 26.5MM BLU: Type: IMPLANTABLE DEVICE | Site: SHOULDER | Status: FUNCTIONAL

## 2023-07-27 DEVICE — TOTL SHLDER REV: Type: IMPLANTABLE DEVICE | Site: SHOULDER | Status: FUNCTIONAL

## 2023-07-27 DEVICE — TRY HUM/SHLDR COMPREHENSIVE/REVERSE MINI COCR STD 40MM: Type: IMPLANTABLE DEVICE | Site: SHOULDER | Status: FUNCTIONAL

## 2023-07-27 DEVICE — BASEPLT GLEN COMPR MINI W TPR ADAPTR 25: Type: IMPLANTABLE DEVICE | Site: SHOULDER | Status: FUNCTIONAL

## 2023-07-27 DEVICE — BEAR HUM VIT/E STD 36MM: Type: IMPLANTABLE DEVICE | Site: SHOULDER | Status: FUNCTIONAL

## 2023-07-27 DEVICE — SCRW FIX LK HEX 4.75X25MM: Type: IMPLANTABLE DEVICE | Site: SHOULDER | Status: FUNCTIONAL

## 2023-07-27 DEVICE — GLENOSPHERE VERSA DIAL FIX STD 36MM: Type: IMPLANTABLE DEVICE | Site: SHOULDER | Status: FUNCTIONAL

## 2023-07-27 DEVICE — SCRW FIX LK HEX 4.75X15MM: Type: IMPLANTABLE DEVICE | Site: SHOULDER | Status: FUNCTIONAL

## 2023-07-27 DEVICE — SCRW COMPRNSV CNTRL 6.5X25MM REUS: Type: IMPLANTABLE DEVICE | Site: SHOULDER | Status: FUNCTIONAL

## 2023-07-27 DEVICE — STEM HUM/SHLDR COMPREHENSIVE MINI 10X83MM: Type: IMPLANTABLE DEVICE | Site: SHOULDER | Status: FUNCTIONAL

## 2023-07-27 RX ORDER — PROMETHAZINE HYDROCHLORIDE 25 MG/1
25 SUPPOSITORY RECTAL ONCE AS NEEDED
Status: DISCONTINUED | OUTPATIENT
Start: 2023-07-27 | End: 2023-07-27 | Stop reason: HOSPADM

## 2023-07-27 RX ORDER — ACETAMINOPHEN 650 MG/1
650 SUPPOSITORY RECTAL EVERY 4 HOURS PRN
Status: DISCONTINUED | OUTPATIENT
Start: 2023-07-27 | End: 2023-07-28 | Stop reason: HOSPADM

## 2023-07-27 RX ORDER — DEXMEDETOMIDINE HYDROCHLORIDE 100 UG/ML
INJECTION, SOLUTION INTRAVENOUS AS NEEDED
Status: DISCONTINUED | OUTPATIENT
Start: 2023-07-27 | End: 2023-07-27 | Stop reason: SURG

## 2023-07-27 RX ORDER — SODIUM CHLORIDE 0.9 % (FLUSH) 0.9 %
3 SYRINGE (ML) INJECTION EVERY 12 HOURS SCHEDULED
Status: DISCONTINUED | OUTPATIENT
Start: 2023-07-27 | End: 2023-07-28 | Stop reason: HOSPADM

## 2023-07-27 RX ORDER — GLYCOPYRROLATE 0.2 MG/ML
0.2 INJECTION INTRAMUSCULAR; INTRAVENOUS
Status: COMPLETED | OUTPATIENT
Start: 2023-07-27 | End: 2023-07-27

## 2023-07-27 RX ORDER — SODIUM CHLORIDE 0.9 % (FLUSH) 0.9 %
10 SYRINGE (ML) INJECTION AS NEEDED
Status: DISCONTINUED | OUTPATIENT
Start: 2023-07-27 | End: 2023-07-28 | Stop reason: HOSPADM

## 2023-07-27 RX ORDER — DEXAMETHASONE SODIUM PHOSPHATE 4 MG/ML
INJECTION, SOLUTION INTRA-ARTICULAR; INTRALESIONAL; INTRAMUSCULAR; INTRAVENOUS; SOFT TISSUE AS NEEDED
Status: DISCONTINUED | OUTPATIENT
Start: 2023-07-27 | End: 2023-07-27 | Stop reason: SURG

## 2023-07-27 RX ORDER — ACETAMINOPHEN 325 MG/1
325 TABLET ORAL EVERY 4 HOURS PRN
Status: DISCONTINUED | OUTPATIENT
Start: 2023-07-27 | End: 2023-07-28 | Stop reason: HOSPADM

## 2023-07-27 RX ORDER — ACETAMINOPHEN 325 MG/1
650 TABLET ORAL EVERY 4 HOURS PRN
Status: DISCONTINUED | OUTPATIENT
Start: 2023-07-27 | End: 2023-07-28 | Stop reason: HOSPADM

## 2023-07-27 RX ORDER — TRANEXAMIC ACID 10 MG/ML
1000 INJECTION, SOLUTION INTRAVENOUS ONCE
Status: COMPLETED | OUTPATIENT
Start: 2023-07-27 | End: 2023-07-27

## 2023-07-27 RX ORDER — CEFAZOLIN SODIUM IN 0.9 % NACL 3 G/100 ML
3 INTRAVENOUS SOLUTION, PIGGYBACK (ML) INTRAVENOUS ONCE
Status: DISCONTINUED | OUTPATIENT
Start: 2023-07-27 | End: 2023-07-27

## 2023-07-27 RX ORDER — PROMETHAZINE HYDROCHLORIDE 12.5 MG/1
25 TABLET ORAL ONCE AS NEEDED
Status: DISCONTINUED | OUTPATIENT
Start: 2023-07-27 | End: 2023-07-27 | Stop reason: HOSPADM

## 2023-07-27 RX ORDER — BUPIVACAINE HYDROCHLORIDE AND EPINEPHRINE 5; 5 MG/ML; UG/ML
INJECTION, SOLUTION EPIDURAL; INTRACAUDAL; PERINEURAL
Status: COMPLETED | OUTPATIENT
Start: 2023-07-27 | End: 2023-07-27

## 2023-07-27 RX ORDER — OXYCODONE HYDROCHLORIDE AND ACETAMINOPHEN 5; 325 MG/1; MG/1
2 TABLET ORAL EVERY 4 HOURS PRN
Status: DISCONTINUED | OUTPATIENT
Start: 2023-07-27 | End: 2023-07-28 | Stop reason: HOSPADM

## 2023-07-27 RX ORDER — LIDOCAINE HYDROCHLORIDE 20 MG/ML
INJECTION, SOLUTION EPIDURAL; INFILTRATION; INTRACAUDAL; PERINEURAL AS NEEDED
Status: DISCONTINUED | OUTPATIENT
Start: 2023-07-27 | End: 2023-07-27 | Stop reason: SURG

## 2023-07-27 RX ORDER — CEFAZOLIN SODIUM 2 G/100ML
2 INJECTION, SOLUTION INTRAVENOUS EVERY 8 HOURS
Status: COMPLETED | OUTPATIENT
Start: 2023-07-27 | End: 2023-07-28

## 2023-07-27 RX ORDER — METOPROLOL SUCCINATE 25 MG/1
25 TABLET, EXTENDED RELEASE ORAL DAILY
Status: DISCONTINUED | OUTPATIENT
Start: 2023-07-28 | End: 2023-07-27

## 2023-07-27 RX ORDER — SODIUM CHLORIDE 9 MG/ML
40 INJECTION, SOLUTION INTRAVENOUS AS NEEDED
Status: DISCONTINUED | OUTPATIENT
Start: 2023-07-27 | End: 2023-07-27 | Stop reason: HOSPADM

## 2023-07-27 RX ORDER — LEVOTHYROXINE SODIUM 0.03 MG/1
25 TABLET ORAL
Status: DISCONTINUED | OUTPATIENT
Start: 2023-07-27 | End: 2023-07-28 | Stop reason: HOSPADM

## 2023-07-27 RX ORDER — ATORVASTATIN CALCIUM 10 MG/1
10 TABLET, FILM COATED ORAL NIGHTLY
Status: DISCONTINUED | OUTPATIENT
Start: 2023-07-27 | End: 2023-07-28 | Stop reason: HOSPADM

## 2023-07-27 RX ORDER — EPHEDRINE SULFATE 50 MG/ML
INJECTION, SOLUTION INTRAVENOUS AS NEEDED
Status: DISCONTINUED | OUTPATIENT
Start: 2023-07-27 | End: 2023-07-27 | Stop reason: SURG

## 2023-07-27 RX ORDER — PROMETHAZINE HYDROCHLORIDE 12.5 MG/1
12.5 SUPPOSITORY RECTAL EVERY 6 HOURS PRN
Status: DISCONTINUED | OUTPATIENT
Start: 2023-07-27 | End: 2023-07-28 | Stop reason: HOSPADM

## 2023-07-27 RX ORDER — FAMOTIDINE 20 MG/1
40 TABLET, FILM COATED ORAL DAILY
Status: DISCONTINUED | OUTPATIENT
Start: 2023-07-27 | End: 2023-07-28 | Stop reason: HOSPADM

## 2023-07-27 RX ORDER — MAGNESIUM HYDROXIDE 1200 MG/15ML
LIQUID ORAL AS NEEDED
Status: DISCONTINUED | OUTPATIENT
Start: 2023-07-27 | End: 2023-07-27 | Stop reason: HOSPADM

## 2023-07-27 RX ORDER — ROCURONIUM BROMIDE 10 MG/ML
INJECTION, SOLUTION INTRAVENOUS AS NEEDED
Status: DISCONTINUED | OUTPATIENT
Start: 2023-07-27 | End: 2023-07-27 | Stop reason: SURG

## 2023-07-27 RX ORDER — LEVOTHYROXINE SODIUM 0.03 MG/1
25 TABLET ORAL
Status: DISCONTINUED | OUTPATIENT
Start: 2023-07-28 | End: 2023-07-27

## 2023-07-27 RX ORDER — AMOXICILLIN 250 MG
2 CAPSULE ORAL 2 TIMES DAILY PRN
Status: DISCONTINUED | OUTPATIENT
Start: 2023-07-27 | End: 2023-07-28 | Stop reason: HOSPADM

## 2023-07-27 RX ORDER — SODIUM CHLORIDE 9 MG/ML
40 INJECTION, SOLUTION INTRAVENOUS AS NEEDED
Status: DISCONTINUED | OUTPATIENT
Start: 2023-07-27 | End: 2023-07-28 | Stop reason: HOSPADM

## 2023-07-27 RX ORDER — PHENYLEPHRINE HCL IN 0.9% NACL 1 MG/10 ML
SYRINGE (ML) INTRAVENOUS AS NEEDED
Status: DISCONTINUED | OUTPATIENT
Start: 2023-07-27 | End: 2023-07-27 | Stop reason: SURG

## 2023-07-27 RX ORDER — OXYCODONE HYDROCHLORIDE 5 MG/1
5 TABLET ORAL
Status: DISCONTINUED | OUTPATIENT
Start: 2023-07-27 | End: 2023-07-27 | Stop reason: HOSPADM

## 2023-07-27 RX ORDER — OXYCODONE HYDROCHLORIDE AND ACETAMINOPHEN 5; 325 MG/1; MG/1
1 TABLET ORAL EVERY 4 HOURS PRN
Status: DISCONTINUED | OUTPATIENT
Start: 2023-07-27 | End: 2023-07-28 | Stop reason: HOSPADM

## 2023-07-27 RX ORDER — FERROUS SULFATE 325(65) MG
325 TABLET ORAL
Status: DISCONTINUED | OUTPATIENT
Start: 2023-07-27 | End: 2023-07-28 | Stop reason: HOSPADM

## 2023-07-27 RX ORDER — PROPOFOL 10 MG/ML
VIAL (ML) INTRAVENOUS AS NEEDED
Status: DISCONTINUED | OUTPATIENT
Start: 2023-07-27 | End: 2023-07-27 | Stop reason: SURG

## 2023-07-27 RX ORDER — BUPROPION HYDROCHLORIDE 100 MG/1
100 TABLET ORAL DAILY
Status: DISCONTINUED | OUTPATIENT
Start: 2023-07-27 | End: 2023-07-28 | Stop reason: HOSPADM

## 2023-07-27 RX ORDER — FENTANYL CITRATE 50 UG/ML
INJECTION, SOLUTION INTRAMUSCULAR; INTRAVENOUS AS NEEDED
Status: DISCONTINUED | OUTPATIENT
Start: 2023-07-27 | End: 2023-07-27 | Stop reason: SURG

## 2023-07-27 RX ORDER — PROMETHAZINE HYDROCHLORIDE 12.5 MG/1
12.5 TABLET ORAL EVERY 6 HOURS PRN
Status: DISCONTINUED | OUTPATIENT
Start: 2023-07-27 | End: 2023-07-28 | Stop reason: HOSPADM

## 2023-07-27 RX ORDER — SODIUM CHLORIDE, SODIUM LACTATE, POTASSIUM CHLORIDE, CALCIUM CHLORIDE 600; 310; 30; 20 MG/100ML; MG/100ML; MG/100ML; MG/100ML
9 INJECTION, SOLUTION INTRAVENOUS CONTINUOUS PRN
Status: DISCONTINUED | OUTPATIENT
Start: 2023-07-27 | End: 2023-07-27 | Stop reason: HOSPADM

## 2023-07-27 RX ORDER — SODIUM CHLORIDE 0.9 % (FLUSH) 0.9 %
10 SYRINGE (ML) INJECTION AS NEEDED
Status: DISCONTINUED | OUTPATIENT
Start: 2023-07-27 | End: 2023-07-27 | Stop reason: HOSPADM

## 2023-07-27 RX ORDER — ONDANSETRON 2 MG/ML
4 INJECTION INTRAMUSCULAR; INTRAVENOUS ONCE AS NEEDED
Status: DISCONTINUED | OUTPATIENT
Start: 2023-07-27 | End: 2023-07-27 | Stop reason: HOSPADM

## 2023-07-27 RX ORDER — METOPROLOL SUCCINATE 25 MG/1
25 TABLET, EXTENDED RELEASE ORAL DAILY
Status: DISCONTINUED | OUTPATIENT
Start: 2023-07-27 | End: 2023-07-28 | Stop reason: HOSPADM

## 2023-07-27 RX ORDER — MIDAZOLAM HYDROCHLORIDE 2 MG/2ML
2 INJECTION, SOLUTION INTRAMUSCULAR; INTRAVENOUS ONCE
Status: COMPLETED | OUTPATIENT
Start: 2023-07-27 | End: 2023-07-27

## 2023-07-27 RX ORDER — BUPROPION HYDROCHLORIDE 100 MG/1
100 TABLET ORAL DAILY
Status: DISCONTINUED | OUTPATIENT
Start: 2023-07-28 | End: 2023-07-27

## 2023-07-27 RX ORDER — CEFAZOLIN SODIUM 2 G/100ML
2 INJECTION, SOLUTION INTRAVENOUS ONCE
Status: COMPLETED | OUTPATIENT
Start: 2023-07-27 | End: 2023-07-27

## 2023-07-27 RX ORDER — ACETAMINOPHEN 500 MG
1000 TABLET ORAL ONCE
Status: COMPLETED | OUTPATIENT
Start: 2023-07-27 | End: 2023-07-27

## 2023-07-27 RX ORDER — SODIUM CHLORIDE, SODIUM LACTATE, POTASSIUM CHLORIDE, CALCIUM CHLORIDE 600; 310; 30; 20 MG/100ML; MG/100ML; MG/100ML; MG/100ML
100 INJECTION, SOLUTION INTRAVENOUS CONTINUOUS
Status: DISCONTINUED | OUTPATIENT
Start: 2023-07-27 | End: 2023-07-28 | Stop reason: HOSPADM

## 2023-07-27 RX ORDER — NALOXONE HCL 0.4 MG/ML
0.4 VIAL (ML) INJECTION
Status: DISCONTINUED | OUTPATIENT
Start: 2023-07-27 | End: 2023-07-28 | Stop reason: HOSPADM

## 2023-07-27 RX ORDER — CELECOXIB 100 MG/1
200 CAPSULE ORAL ONCE
Status: COMPLETED | OUTPATIENT
Start: 2023-07-27 | End: 2023-07-27

## 2023-07-27 RX ADMIN — BUPIVACAINE HYDROCHLORIDE AND EPINEPHRINE BITARTRATE 32 ML: 5; .005 INJECTION, SOLUTION EPIDURAL; INTRACAUDAL; PERINEURAL at 08:18

## 2023-07-27 RX ADMIN — METOPROLOL SUCCINATE 25 MG: 25 TABLET, EXTENDED RELEASE ORAL at 16:11

## 2023-07-27 RX ADMIN — SUGAMMADEX 200 MG: 100 INJECTION, SOLUTION INTRAVENOUS at 09:43

## 2023-07-27 RX ADMIN — GLYCOPYRROLATE 0.2 MG: 0.2 INJECTION INTRAMUSCULAR; INTRAVENOUS at 07:41

## 2023-07-27 RX ADMIN — Medication 100 MCG: at 09:11

## 2023-07-27 RX ADMIN — TRANEXAMIC ACID 1000 MG: 10 INJECTION, SOLUTION INTRAVENOUS at 09:31

## 2023-07-27 RX ADMIN — PROPOFOL 150 MG: 10 INJECTION, EMULSION INTRAVENOUS at 08:26

## 2023-07-27 RX ADMIN — ROCURONIUM BROMIDE 20 MG: 10 SOLUTION INTRAVENOUS at 09:03

## 2023-07-27 RX ADMIN — BUPROPION HYDROCHLORIDE 100 MG: 100 TABLET, FILM COATED ORAL at 16:11

## 2023-07-27 RX ADMIN — FERROUS SULFATE TAB 325 MG (65 MG ELEMENTAL FE) 325 MG: 325 (65 FE) TAB at 13:35

## 2023-07-27 RX ADMIN — OXYCODONE AND ACETAMINOPHEN 1 TABLET: 5; 325 TABLET ORAL at 16:13

## 2023-07-27 RX ADMIN — Medication 100 MCG: at 09:33

## 2023-07-27 RX ADMIN — DEXAMETHASONE SODIUM PHOSPHATE 4 MG: 4 INJECTION, SOLUTION INTRAMUSCULAR; INTRAVENOUS at 08:50

## 2023-07-27 RX ADMIN — ROCURONIUM BROMIDE 50 MG: 10 SOLUTION INTRAVENOUS at 08:26

## 2023-07-27 RX ADMIN — LEVOTHYROXINE SODIUM 25 MCG: 75 TABLET ORAL at 16:11

## 2023-07-27 RX ADMIN — OXYCODONE AND ACETAMINOPHEN 1 TABLET: 5; 325 TABLET ORAL at 21:28

## 2023-07-27 RX ADMIN — SODIUM CHLORIDE, POTASSIUM CHLORIDE, SODIUM LACTATE AND CALCIUM CHLORIDE 9 ML/HR: 600; 310; 30; 20 INJECTION, SOLUTION INTRAVENOUS at 07:48

## 2023-07-27 RX ADMIN — Medication 100 MCG: at 08:58

## 2023-07-27 RX ADMIN — FENTANYL CITRATE 25 MCG: 50 INJECTION, SOLUTION INTRAMUSCULAR; INTRAVENOUS at 08:26

## 2023-07-27 RX ADMIN — FENTANYL CITRATE 25 MCG: 50 INJECTION, SOLUTION INTRAMUSCULAR; INTRAVENOUS at 09:18

## 2023-07-27 RX ADMIN — ATORVASTATIN CALCIUM 10 MG: 10 TABLET, FILM COATED ORAL at 21:26

## 2023-07-27 RX ADMIN — CEFAZOLIN SODIUM 2 G: 2 INJECTION, SOLUTION INTRAVENOUS at 08:27

## 2023-07-27 RX ADMIN — ACETAMINOPHEN 1000 MG: 500 TABLET ORAL at 07:41

## 2023-07-27 RX ADMIN — EPHEDRINE SULFATE 5 MG: 50 INJECTION INTRAVENOUS at 09:21

## 2023-07-27 RX ADMIN — EPHEDRINE SULFATE 5 MG: 50 INJECTION INTRAVENOUS at 09:09

## 2023-07-27 RX ADMIN — DEXMEDETOMIDINE 35 MCG: 100 INJECTION, SOLUTION INTRAVENOUS at 08:10

## 2023-07-27 RX ADMIN — FENTANYL CITRATE 25 MCG: 50 INJECTION, SOLUTION INTRAMUSCULAR; INTRAVENOUS at 08:56

## 2023-07-27 RX ADMIN — FAMOTIDINE 40 MG: 20 TABLET ORAL at 13:35

## 2023-07-27 RX ADMIN — Medication 100 MCG: at 09:24

## 2023-07-27 RX ADMIN — TRANEXAMIC ACID 1000 MG: 10 INJECTION, SOLUTION INTRAVENOUS at 07:47

## 2023-07-27 RX ADMIN — CELECOXIB 200 MG: 100 CAPSULE ORAL at 07:41

## 2023-07-27 RX ADMIN — SODIUM CHLORIDE, POTASSIUM CHLORIDE, SODIUM LACTATE AND CALCIUM CHLORIDE 100 ML/HR: 600; 310; 30; 20 INJECTION, SOLUTION INTRAVENOUS at 16:31

## 2023-07-27 RX ADMIN — MIDAZOLAM HYDROCHLORIDE 2 MG: 1 INJECTION, SOLUTION INTRAMUSCULAR; INTRAVENOUS at 07:41

## 2023-07-27 RX ADMIN — SODIUM CHLORIDE, POTASSIUM CHLORIDE, SODIUM LACTATE AND CALCIUM CHLORIDE: 600; 310; 30; 20 INJECTION, SOLUTION INTRAVENOUS at 09:33

## 2023-07-27 RX ADMIN — FENTANYL CITRATE 25 MCG: 50 INJECTION, SOLUTION INTRAMUSCULAR; INTRAVENOUS at 09:30

## 2023-07-27 RX ADMIN — LIDOCAINE HYDROCHLORIDE 100 MG: 20 INJECTION, SOLUTION EPIDURAL; INFILTRATION; INTRACAUDAL; PERINEURAL at 08:26

## 2023-07-27 RX ADMIN — CEFAZOLIN SODIUM 2 G: 2 INJECTION, SOLUTION INTRAVENOUS at 16:11

## 2023-07-27 NOTE — PLAN OF CARE
Problem: Adult Inpatient Plan of Care  Goal: Plan of Care Review  Outcome: Ongoing, Progressing  Goal: Patient-Specific Goal (Individualized)  Outcome: Ongoing, Progressing  Goal: Absence of Hospital-Acquired Illness or Injury  Outcome: Ongoing, Progressing  Intervention: Identify and Manage Fall Risk  Recent Flowsheet Documentation  Taken 7/27/2023 1643 by Alia Gonzalez RN  Safety Promotion/Fall Prevention: safety round/check completed  Taken 7/27/2023 1425 by Alia Gonzalez RN  Safety Promotion/Fall Prevention: safety round/check completed  Taken 7/27/2023 1225 by Alia Gonzalez RN  Safety Promotion/Fall Prevention: safety round/check completed  Taken 7/27/2023 1125 by Alia Gonzalez RN  Safety Promotion/Fall Prevention: safety round/check completed  Intervention: Prevent Skin Injury  Recent Flowsheet Documentation  Taken 7/27/2023 1125 by Alia Gonzalez RN  Body Position: position changed independently  Skin Protection:   adhesive use limited   tubing/devices free from skin contact  Intervention: Prevent and Manage VTE (Venous Thromboembolism) Risk  Recent Flowsheet Documentation  Taken 7/27/2023 1125 by Alia Gonzalez RN  Activity Management:   ambulated in room   ambulated to bathroom  VTE Prevention/Management:   bilateral   compression stockings on  Range of Motion: ROM (range of motion) performed  Intervention: Prevent Infection  Recent Flowsheet Documentation  Taken 7/27/2023 1125 by Alia Gonzalez RN  Infection Prevention:   environmental surveillance performed   hand hygiene promoted   rest/sleep promoted   single patient room provided   visitors restricted/screened  Goal: Optimal Comfort and Wellbeing  Outcome: Ongoing, Progressing  Intervention: Monitor Pain and Promote Comfort  Recent Flowsheet Documentation  Taken 7/27/2023 1643 by Alia Gonzalez RN  Pain Management Interventions:   see MAR   quiet environment facilitated   care clustered   cold applied  Taken 7/27/2023 1125  by Carlos, Alia, RN  Pain Management Interventions:   see MAR   quiet environment facilitated   care clustered   cold applied  Intervention: Provide Person-Centered Care  Recent Flowsheet Documentation  Taken 7/27/2023 1125 by Alia Gonzalez RN  Trust Relationship/Rapport:   care explained   emotional support provided   questions answered   thoughts/feelings acknowledged  Goal: Readiness for Transition of Care  Outcome: Ongoing, Progressing  Intervention: Mutually Develop Transition Plan  Recent Flowsheet Documentation  Taken 7/27/2023 1505 by Aila Gonzalez RN  Transportation Anticipated: family or friend will provide  Transportation Concerns: none  Patient/Family Anticipated Services at Transition: outpatient care  Patient/Family Anticipates Transition to: home with family  Taken 7/27/2023 1502 by Alia Gonzalez RN  Equipment Currently Used at Home:   walker, rolling   commode   grab bar   cane, quad tip     Problem: Fall Injury Risk  Goal: Absence of Fall and Fall-Related Injury  Outcome: Ongoing, Progressing  Intervention: Identify and Manage Contributors  Recent Flowsheet Documentation  Taken 7/27/2023 1643 by Alia Gonzalez RN  Medication Review/Management: medications reviewed  Taken 7/27/2023 1425 by Alia Gonzalez RN  Medication Review/Management: medications reviewed  Taken 7/27/2023 1225 by Alia Gonzalez RN  Medication Review/Management: medications reviewed  Taken 7/27/2023 1125 by Alia Gonzalez RN  Medication Review/Management: medications reviewed  Intervention: Promote Injury-Free Environment  Recent Flowsheet Documentation  Taken 7/27/2023 1643 by Alia Gonzalez RN  Safety Promotion/Fall Prevention: safety round/check completed  Taken 7/27/2023 1425 by Alia Gonzalez RN  Safety Promotion/Fall Prevention: safety round/check completed  Taken 7/27/2023 1225 by Alia Gonzalez RN  Safety Promotion/Fall Prevention: safety round/check completed  Taken 7/27/2023 1125 by  Carlos, Alia, RN  Safety Promotion/Fall Prevention: safety round/check completed     Problem: Pain Acute  Goal: Acceptable Pain Control and Functional Ability  Outcome: Ongoing, Progressing  Intervention: Prevent or Manage Pain  Recent Flowsheet Documentation  Taken 7/27/2023 1643 by Alia Gonzalez RN  Medication Review/Management: medications reviewed  Taken 7/27/2023 1425 by Alia Gonzalez RN  Medication Review/Management: medications reviewed  Taken 7/27/2023 1225 by Alia Gonzalez RN  Medication Review/Management: medications reviewed  Taken 7/27/2023 1125 by Alia Gonzalez RN  Medication Review/Management: medications reviewed  Intervention: Develop Pain Management Plan  Recent Flowsheet Documentation  Taken 7/27/2023 1643 by Alia Gonzalez RN  Pain Management Interventions:   see MAR   quiet environment facilitated   care clustered   cold applied  Taken 7/27/2023 1125 by Alia Gonzalez RN  Pain Management Interventions:   see MAR   quiet environment facilitated   care clustered   cold applied  Intervention: Optimize Psychosocial Wellbeing  Recent Flowsheet Documentation  Taken 7/27/2023 1125 by Alia Gonzalez RN  Diversional Activities:   television   smartphone     Problem: Adjustment to Surgery (Shoulder Arthroplasty)  Goal: Optimal Coping  Outcome: Ongoing, Progressing     Problem: Bleeding (Shoulder Arthroplasty)  Goal: Absence of Bleeding  Outcome: Ongoing, Progressing  Intervention: Monitor and Manage Bleeding  Recent Flowsheet Documentation  Taken 7/27/2023 1125 by Alia Gonzalez RN  Bleeding Management: dressing monitored     Problem: Bowel Motility Impaired (Shoulder Arthroplasty)  Goal: Effective Bowel Elimination  Outcome: Ongoing, Progressing     Problem: Fluid and Electrolyte Imbalance (Shoulder Arthroplasty)  Goal: Fluid and Electrolyte Balance  Outcome: Ongoing, Progressing     Problem: Functional Ability Impaired (Shoulder Arthroplasty)  Goal: Optimal Functional  Ability  Outcome: Ongoing, Progressing  Intervention: Promote Optimal Functional Status  Recent Flowsheet Documentation  Taken 7/27/2023 1125 by Alia Gonzalez RN  Activity Management:   ambulated in room   ambulated to bathroom  Assistive Device Utilized: gait belt     Problem: Infection (Shoulder Arthroplasty)  Goal: Absence of Infection Signs and Symptoms  Outcome: Ongoing, Progressing     Problem: Neurovascular Compromise (Shoulder Arthroplasty)  Goal: Intact Neurovascular Status  Outcome: Ongoing, Progressing     Problem: Ongoing Anesthesia Effects (Shoulder Arthroplasty)  Goal: Anesthesia/Sedation Recovery  Outcome: Ongoing, Progressing  Intervention: Optimize Anesthesia Recovery  Recent Flowsheet Documentation  Taken 7/27/2023 1716 by Alia Gonzalez RN  Patient Tolerance (IS):   good   no adverse signs/symptoms present  Administration (IS): self-administered  Level Incentive Spirometer (mL): 1500  Number of Repetitions (IS): 10  Taken 7/27/2023 1643 by Alia Gonzalez RN  Safety Promotion/Fall Prevention: safety round/check completed  Taken 7/27/2023 1600 by Alia Gonzalez RN  Patient Tolerance (IS):   good   no adverse signs/symptoms present  Administration (IS): self-administered  Level Incentive Spirometer (mL): 1500  Number of Repetitions (IS): 10  Taken 7/27/2023 1425 by Alia Gonzalez RN  Safety Promotion/Fall Prevention: safety round/check completed  Taken 7/27/2023 1400 by Alia Gonzalez RN  Patient Tolerance (IS):   good   no adverse signs/symptoms present  Administration (IS): self-administered  Level Incentive Spirometer (mL): 2000  Number of Repetitions (IS): 10  Taken 7/27/2023 1225 by Alia Gonzalez RN  Safety Promotion/Fall Prevention: safety round/check completed  Taken 7/27/2023 1200 by Alia Gonzalez RN  Patient Tolerance (IS):   good   no adverse signs/symptoms present  Administration (IS):   self-administered   instruction provided, follow-up   proper technique  demonstrated  Level Incentive Spirometer (mL): 2000  Number of Repetitions (IS): 10  Taken 7/27/2023 1125 by Alia Gonzalez RN  Safety Promotion/Fall Prevention: safety round/check completed     Problem: Pain (Shoulder Arthroplasty)  Goal: Acceptable Pain Control  Outcome: Ongoing, Progressing  Intervention: Prevent or Manage Pain  Recent Flowsheet Documentation  Taken 7/27/2023 1643 by Alia Gonzalez RN  Pain Management Interventions:   see MAR   quiet environment facilitated   care clustered   cold applied  Taken 7/27/2023 1125 by Alia Gonzalez RN  Pain Management Interventions:   see MAR   quiet environment facilitated   care clustered   cold applied  Diversional Activities:   television   smartphone     Problem: Postoperative Nausea and Vomiting (Shoulder Arthroplasty)  Goal: Nausea and Vomiting Relief  Outcome: Ongoing, Progressing     Problem: Postoperative Urinary Retention (Shoulder Arthroplasty)  Goal: Effective Urinary Elimination  Outcome: Ongoing, Progressing     Problem: Diabetes Comorbidity  Goal: Blood Glucose Level Within Targeted Range  Outcome: Ongoing, Progressing     Problem: Hypertension Comorbidity  Goal: Blood Pressure in Desired Range  Outcome: Ongoing, Progressing  Intervention: Maintain Blood Pressure Management  Recent Flowsheet Documentation  Taken 7/27/2023 1643 by Alia Gonzalez RN  Medication Review/Management: medications reviewed  Taken 7/27/2023 1425 by Alia Gonzalez RN  Medication Review/Management: medications reviewed  Taken 7/27/2023 1225 by Alia Gonzalez RN  Medication Review/Management: medications reviewed  Taken 7/27/2023 1125 by Alia Gonzalez RN  Medication Review/Management: medications reviewed   Goal Outcome Evaluation:   Pt has had no new changes throughout shift and continues to remain stable. Pt had right RTSA performed this afternoon by Dr. Ruano. Pt has had little complaints of pain. Pt's pain has been controlled with Prn medication. Pt has  been medicated x1. Pt is x1 assist to the BR. Pt has voided without issue. Pt plans to DC home tomorrow. Pt does not need DME. Pt will use meds to bed. Pt will do outpatient therapy and spouse is ride home.

## 2023-07-27 NOTE — THERAPY EVALUATION
Acute Care - Physical Therapy Initial Evaluation  [facility]     Patient Name: Kim Owen  : 1950  MRN: [id number]  Today's Date: 2023      Admit date: 2023     Referring Physician: Sonido Brand MD     Surgery Date:2023   Procedure(s) (LRB):  TOTAL SHOULDER REVERSE ARTHROPLASTY (Right)         Visit Dx:     ICD-10-CM ICD-9-CM   1. Difficulty in walking  R26.2 719.7   2. Tear of right rotator cuff, unspecified tear extent, unspecified whether traumatic  M75.101 840.4     Patient Active Problem List   Diagnosis    Essential (primary) hypertension    Hyperlipidemia, unspecified    Hypothyroidism, unspecified    Other specified disorders of bone density and structure, unspecified site    Vitamin D deficiency, unspecified    Major depressive disorder, recurrent, moderate    Primary generalized (osteo)arthritis    Spinal stenosis, lumbar region without neurogenic claudication    Chronic pain    Low back pain    Other long term (current) drug therapy    Spondylosis without myelopathy    Renal insufficiency    Degeneration of lumbar intervertebral disc    Anxiety    S/P lumbar fusion    Type 2 diabetes mellitus without complication, without long-term current use of insulin    Osteoarthrosis, localized, primary, knee, left    Rotator cuff arthropathy of right shoulder    Tear of right rotator cuff     Past Medical History:   Diagnosis Date    Anesthesia     STATES CAN BE SLOW TO WAKE UP AT TIMES    Chronic pain     BACK, NO LONGER SEES PM CLINIC    Diabetes     DOES NOT CHECK BS DAILY    Essential (primary) hypertension     Hyperlipidemia, unspecified     Hypothyroidism, unspecified     Low back pain     Major depressive disorder, recurrent, moderate     Primary generalized (osteo)arthritis     L KNEE    [place] spotted fever     DX 2014 NO CURRENT S/S    Spinal stenosis, lumbar region without neurogenic claudication     Torn rotator cuff     RIGHT    Vitamin D deficiency, unspecified      Zoster without complications      Past Surgical History:   Procedure Laterality Date    COLONOSCOPY  2012    HYSTERECTOMY      AGE 48  ENDOMETRIOSIS, MENORRHAGIA    JOINT REPLACEMENT Right 2016    KNEE    SPINE SURGERY      L5-S1    DR. DEAN HONEYCUTT  5/21    TOTAL KNEE ARTHROPLASTY Left 1/10/2023    Procedure: LEFT TOTAL KNEE ARTHROPLASTY WITH JANET ROBOT;  Surgeon: Lexa Ruano MD;  Location: [facility] MAIN OR;  Service: Orthopedics;  Laterality: Left;     PT Assessment (last 12 hours)       PT Evaluation and Treatment       Row Name 07/27/23 1400          Physical Therapy Time and Intention    Subjective Information no complaints  -DES     Document Type evaluation  -DES     Mode of Treatment individual therapy;physical therapy  -DES     Patient Effort good  -DES       Row Name 07/27/23 1400          General Information    Patient Observations alert;cooperative;agree to therapy  -DES     Prior Level of Function independent:;all household mobility;community mobility  -DES     Equipment Currently Used at Home none  -DES     Existing Precautions/Restrictions fall;brace on at all times  -DES     Barriers to Rehab none identified  -DES       Row Name 07/27/23 1400          Living Environment    Current Living Arrangements home  -DES     People in Home spouse  -DES       Row Name 07/27/23 1400          Range of Motion (ROM)    Range of Motion ROM is WFL;bilateral lower extremities  -DES       Row Name 07/27/23 1400          Strength (Manual Muscle Testing)    Strength (Manual Muscle Testing) strength is WFL;bilateral lower extremities  -DES       Row Name 07/27/23 1400          Bed Mobility    Bed Mobility bed mobility (all) activities;supine-sit  -DES     All Activities, [place] (Bed Mobility) minimum assist (75% patient effort)  -DES     Supine-Sit [place] (Bed Mobility) minimum assist (75% patient effort)  -DES       Row Name 07/27/23 1400          Transfers    Transfers bed-chair transfer;sit-stand transfer  -DES       Row  Name 07/27/23 1400          Bed-Chair Transfer    Bed-Chair [place] (Transfers) minimum assist (75% patient effort)  -DES       Row Name 07/27/23 1400          Sit-Stand Transfer    Sit-Stand [place] (Transfers) minimum assist (75% patient effort)  -DES       Row Name 07/27/23 1400          Gait/Stairs (Locomotion)    Gait/Stairs Locomotion gait/ambulation independence  -DES     [place] Level (Gait) minimum assist (75% patient effort)  -DES       Row Name 07/27/23 1400          Safety Issues, Functional Mobility    Impairments Affecting Function (Mobility) balance;pain;strength  -DES       Row Name 07/27/23 1400          Balance    Balance Assessment standing dynamic balance  -DES     Dynamic Standing Balance minimal assist  -DES       Row Name             [REMOVED] Wound 01/10/23 1108 Left anterior knee Incision    Wound - Properties Group Placement Date: 01/10/23  -SC Placement Time: 1108  -SC Present on Hospital Admission: N  -SC Side: Left  -SC Orientation: anterior  -SC Location: knee  -SC Primary Wound Type: Incision  -SC Removal Date: 07/27/23  -BN Removal Time: 0641 -BN    Retired Wound - Properties Group Placement Date: 01/10/23  -SC Placement Time: 1108  -SC Present on Hospital Admission: N  -SC Side: Left  -SC Orientation: anterior  -SC Location: knee  -SC Primary Wound Type: Incision  -SC Removal Date: 07/27/23  -BN Removal Time: 0641 -BN    Retired Wound - Properties Group Date first assessed: 01/10/23  -SC Time first assessed: 1108  -SC Present on Hospital Admission: N  -SC Side: Left  -SC Location: knee  -SC Primary Wound Type: Incision  -SC Resolution Date: 07/27/23  -BN Resolution Time: 0641 -BN      Row Name             Wound 07/27/23 0859 Right axilla Incision    Wound - Properties Group Placement Date: 07/27/23  -LB Placement Time: 0859  -LB Side: Right  -LB Location: axilla  -LB Primary Wound Type: Incision  -LB    Retired Wound - Properties Group Placement Date: 07/27/23  -LB  Placement Time: 0859 -LB Side: Right  -LB Location: axilla  -LB Primary Wound Type: Incision  -LB    Retired Wound - Properties Group Date first assessed: 07/27/23  -LB Time first assessed: 0859 -LB Side: Right  -LB Location: axilla  -LB Primary Wound Type: Incision  -LB      Row Name 07/27/23 1400          Plan of Care Review    Plan of Care Reviewed With patient  -DES     Outcome Evaluation Patient presents with decreased strength, transfers and ambulation.  Skilled physical therapy services will be required to address these mobility deficits.  -DES       Row Name 07/27/23 1400          Therapy Assessment/Plan (PT)    Rehab Potential (PT) good, to achieve stated therapy goals  -DES     Criteria for Skilled Interventions Met (PT) skilled treatment is necessary  -DES     Therapy Frequency (PT) daily  -DES     Predicted Duration of Therapy Intervention (PT) 10 days  -DES     Problem List (PT) problems related to;balance;mobility;strength;range of motion (ROM);pain  -DES     Activity Limitations Related to Problem List (PT) unable to ambulate safely;unable to transfer safely  -DES       Row Name 07/27/23 1400          PT Evaluation Complexity    History, PT Evaluation Complexity no personal factors and/or comorbidities  -DES     Examination of Body Systems (PT Eval Complexity) total of 4 or more elements  -DES     Clinical Presentation (PT Evaluation Complexity) stable  -DES     Clinical Decision Making (PT Evaluation Complexity) low complexity  -DES     Overall Complexity (PT Evaluation Complexity) low complexity  -DES       Row Name 07/27/23 1400          Therapy Plan Review/Discharge Plan (PT)    Therapy Plan Review (PT) evaluation/treatment results reviewed;participants voiced agreement with care plan;participants included;patient  -DES       Row Name 07/27/23 1400          Physical Therapy Goals    Transfer Goal Selection (PT) transfer, PT goal 1  -DES     Gait Training Goal Selection (PT) gait training, PT goal 1  -DES        Row Name 07/27/23 1400          Transfer Goal 1 (PT)    Activity/Assistive Device (Transfer Goal 1, PT) transfers, all  -DES     [place] Level/Cues Needed (Transfer Goal 1, PT) independent  -DES     Time Frame (Transfer Goal 1, PT) long term goal (LTG);10 days  -DES       Row Name 07/27/23 1400          Gait Training Goal 1 (PT)    Activity/Assistive Device (Gait Training Goal 1, PT) gait (walking locomotion)  -DES     [place] Level (Gait Training Goal 1, PT) independent  -DES     Distance (Gait Training Goal 1, PT) 300  -DES     Time Frame (Gait Training Goal 1, PT) long term goal (LTG);10 days  -DES               User Key  (r) = Recorded By, (t) = Taken By, (c) = Cosigned By      Initials Name Provider Type    SC Eunice Childs, RN Registered Nurse    Thomas Feliz RN Registered Nurse    Adair Martines RN Registered Nurse    Gutierrez George PT Physical Therapist                    Physical Therapy Education       Title: PT OT SLP Therapies (Done)       Topic: Physical Therapy (Done)       Point: Mobility training (Done)       Learning Progress Summary             Patient Acceptance, E,TB, VU by DES at 7/27/2023 1440                         Point: Precautions (Done)       Learning Progress Summary             Patient Acceptance, E,TB, VU by DES at 7/27/2023 1440                                         User Key       Initials Effective Dates Name Provider Type Discipline    DES 06/03/21 -  Gutierrez Atkinson PT Physical Therapist PT                  PT Recommendation and Plan  Anticipated Discharge Disposition (PT): home with outpatient therapy services  Planned Therapy Interventions (PT): balance training, bed mobility training, gait training, home exercise program, stair training, strengthening, transfer training  Therapy Frequency (PT): daily  Plan of Care Reviewed With: patient  Outcome Evaluation: Patient presents with decreased strength, transfers and ambulation.  Skilled physical therapy  services will be required to address these mobility deficits.   Outcome Measures       Row Name 07/27/23 1400             How much help from another person do you currently need...    Turning from your back to your side while in flat bed without using bedrails? 3  -DES      Moving from lying on back to sitting on the side of a flat bed without bedrails? 3  -EDS      Moving to and from a bed to a chair (including a wheelchair)? 3  -DES      Standing up from a chair using your arms (e.g., wheelchair, bedside chair)? 3  -DES      Climbing 3-5 steps with a railing? 3  -DES      To walk in hospital room? 3  -DES      AM-PAC 6 Clicks Score (PT) 18  -DES         Functional Assessment    Outcome Measure Options AM-PAC 6 Clicks Basic Mobility (PT)  -DES                User Key  (r) = Recorded By, (t) = Taken By, (c) = Cosigned By      Initials Name Provider Type    Gutierrez George, SEMAJ Physical Therapist                     Time Calculation:    PT Charges       Row Name 07/27/23 1436             Time Calculation    PT Received On 07/27/23  -DES      PT Goal Re-Cert Due Date 08/05/23  -DES         Untimed Charges    PT Eval/Re-eval Minutes 30  -DES         Total Minutes    Untimed Charges Total Minutes 30  -DES       Total Minutes 30  -DES                User Key  (r) = Recorded By, (t) = Taken By, (c) = Cosigned By      Initials Name Provider Type    Gutierrez George, PT Physical Therapist                  Therapy Charges for Today       Code Description Service Date Service Provider Modifiers Qty    [id number] HC PT EVAL LOW COMPLEXITY 3 7/27/2023 Gutierrez Atkinson PT GP 1            PT G-Codes  Outcome Measure Options: AM-PAC 6 Clicks Basic Mobility (PT)  AM-PAC 6 Clicks Score (PT): 18    Gutierrez Atkinson, PT  7/27/2023

## 2023-07-27 NOTE — H&P
[facility]   HOSPITALIST HISTORY AND PHYSICAL  Date: 2023   Patient Name: Kim Owen  : 1950  MRN: [id number]  Primary Care Physician:  Lindy Chavarria, MINGO  Date of admission: 2023    Subjective   Subjective     Chief Complaint: Medical management of essential hypertension, hypothyroidism, hyperlipidemia and depression    HPI:  Kim Owen is a 73 y.o. female with essential hypertension, hypothyroidism, hyperlipidemia, depression and obesity (BMI: 40.89) that presented for scheduled right total shoulder arthroplasty.  Patient sustained a fall in May 2023 and subsequently developed right shoulder pain.  X-ray was obtained and no acute abnormalities were noted.  Patient did attend physical therapy but continued to have pain and discomfort which actually worsened some.  On 2023 patient had MRI done of the right shoulder that demonstrated tear of right rotator cuff.  Patient continued to experience pain and weakness.  Decision was made to pursue right total shoulder arthroplasty.  Hospitalist service contacted for assistance in management of patient's essential hypertension, hypothyroidism, hyperlipidemia and depression following procedure.  Patient without any acute complaints after procedure.  Denies any chest pain or shortness of breath.    Personal History     Past Medical History:  Essential hypertension  Hypothyroidism  Hyperlipidemia  Depression  Obesity (BMI: 40.89)    Past Surgical History:  Hysterectomy  Right total knee arthroplasty  Spine surgery  Left total knee arthroplasty    Family History:   Mother: ; no history of heart disease, cancer or stroke  Father: ; history of heart disease and diabetes    Social History:   Tobacco: Denies use  Alcohol: Denies use  Illicit Substances: Denies use    Home Medications:  atorvastatin, buPROPion, chlorthalidone, ibuprofen, levothyroxine, losartan, meloxicam, metFORMIN ER, and metoprolol succinate  XL    Allergies:  Allergies   Allergen Reactions    Adhesive Tape Rash     BLISTERS/RASH TO SKIN W/TKA 1/2023       Review of Systems  All systems were reviewed and negative except for:   Review of Systems - Musculoskeletal ROS: positive for - joint pain      Objective   Objective     Vitals:   Temp:  [97 °F (36.1 °C)-97.6 °F (36.4 °C)] 97.6 °F (36.4 °C)  Heart Rate:  [74-88] 83  Resp:  [16-22] 18  BP: ()/(48-83) 109/61  Flow (L/min):  [2-6] 3.5  FiO2 (%):  [45 %-99 %] 45 %    Physical Exam    Constitutional: Awake, alert, no acute distress, sitting up in chair bedside eating lunch and watching TV   Eyes: Pupils equal, sclerae anicteric, no conjunctival injection   HENT: NCAT, mucous membranes moist   Neck: Supple, no thyromegaly, no lymphadenopathy, trachea midline   Respiratory: Clear to auscultation bilaterally, nonlabored respirations    Cardiovascular: RRR, no murmurs, rubs, or gallops, palpable pedal pulses bilaterally   Gastrointestinal: Positive bowel sounds, soft, nontender, nondistended   Musculoskeletal: No bilateral ankle edema, no clubbing or cyanosis to extremities   Psychiatric: Appropriate affect, cooperative   Neurologic: Oriented x 3, strength symmetric in all extremities, Cranial Nerves grossly intact, speech clear   Skin: No rashes     Result Review    Result Review:  I have personally reviewed the results from the time of this admission to 7/27/2023 14:16 EDT and agree with these findings:  [x]  Laboratory:  CMP          1/4/2023    08:25 6/14/2023    08:59 7/21/2023    09:02   CMP   Glucose 140  141  175    BUN 28  19  15    Creatinine 1.18  1.03  0.96    EGFR 49.5  58.3  63.4    Sodium 136  137  136    Potassium 3.4  3.6  3.1    Chloride 98  96  96    Calcium 9.9  10.6  10.1    Total Protein 7.4  7.5  7.8    Albumin 4.2  4.4  4.3    Globulin 3.2  3.1  3.5    Total Bilirubin 0.5  0.4  0.4    Alkaline Phosphatase 55  71  73    AST (SGOT) 19  15  19    ALT (SGPT) 22  21  21     Albumin/Globulin Ratio 1.3  1.4  1.2    BUN/Creatinine Ratio 23.7  18.4  15.6    Anion Gap 12.4  12.2  15.3      CBC          1/11/2023    04:04 6/14/2023    08:59 7/21/2023    09:02   CBC   WBC 15.42  6.30  7.34    RBC 4.03  4.76  4.46    Hemoglobin 12.0  13.9  13.5    Hematocrit 34.6  40.4  37.9    MCV 85.9  84.9  85.0    MCH 29.8  29.2  30.3    MCHC 34.7  34.4  35.6    RDW 13.4  13.9  13.8    Platelets 273  272  284      []  Microbiology:  [x]  Radiology: Shoulder x-ray imaging personally reviewed.  Prosthesis in appropriate positioning.  No acute fractures noted.  []  EKG/Telemetry:   []  Cardiology/Vascular:   []  Pathology:  []  Old records:  []  Other:      Assessment & Plan   Assessment / Plan     Assessment:  Right rotator cuff tear s/p right total shoulder arthroplasty  Essential hypertension  Hyperlipidemia  Hypothyroidism  Depression.  Obesity (BMI: 40.89)    Plan:  -Start home metoprolol for hypertension.  Hold home chlorthalidone and losartan since patient got anesthesia and patient also receiving pain medication.  If patient's blood pressure becomes elevated will resume accordingly  -Start home levothyroxine for hypothyroidism  -Start home bupropion for depression  -Start home atorvastatin for hyperlipidemia  -Continue IV fluids postprocedure  -Pain management with as needed Percocet and IV morphine  -DVT prophylaxis with apixaban  -PT/OT consulted  -As needed Phenergan for nausea  -Monitor electrolytes and renal function with BMP and magnesium level in the AM  -Monitor WBC and Hgb with CBC in the AM  -Clinical course will dictate further management     DVT Prophylaxis: Apixaban  GI Prophylaxis: Famotidine  Diet: Cardiac  Dispo: PT/OT consulted  Code Status: Full code     I have spent 45 minutes personally reviewing patients labs and imaging, examining and interviewing the patient and discussing patient's care with the patient, nurse at bedside and Orthopedic Surgery.     Part of this note may be an  electronic transcription/translation of spoken language to printed text using the Dragon dictation system.      DVT prophylaxis:  Medical and mechanical DVT prophylaxis orders are present.    CODE STATUS:     Full Code      Electronically signed by Sonido Brand MD, 07/27/23, 2:16 PM EDT.

## 2023-07-27 NOTE — ANESTHESIA POSTPROCEDURE EVALUATION
Patient: Kim Owen    Procedure Summary       Date: 07/27/23 Room / Location: [facility] OR 06 / [facility] MAIN OR    Anesthesia Start: 0817 Anesthesia Stop: 1001    Procedure: TOTAL SHOULDER REVERSE ARTHROPLASTY (Right: Shoulder) Diagnosis:       Tear of right rotator cuff, unspecified tear extent, unspecified whether traumatic      (Tear of right rotator cuff, unspecified tear extent, unspecified whether traumatic [M75.101])    Surgeons: Lexa Ruano MD Provider: Fauzia Huertas MD    Anesthesia Type: general with block ASA Status: 3            Anesthesia Type: general with block    Vitals  Vitals Value Taken Time   /62 07/27/23 1050   Temp 36.1 °C (97 °F) 07/27/23 1045   Pulse 84 07/27/23 1051   Resp 20 07/27/23 1050   SpO2 93 % 07/27/23 1051   Vitals shown include unvalidated device data.        Post Anesthesia Care and Evaluation    Patient location during evaluation: bedside  Patient participation: complete - patient participated  Level of consciousness: awake  Pain management: adequate    Airway patency: patent  PONV Status: none  Cardiovascular status: acceptable and stable  Respiratory status: acceptable  Hydration status: acceptable    Comments: An Anesthesiologist personally participated in the most demanding procedures (including induction and emergence if applicable) in the anesthesia plan, monitored the course of anesthesia administration at frequent intervals and remained physically present and available for immediate diagnosis and treatment of emergencies.

## 2023-07-27 NOTE — ANESTHESIA PROCEDURE NOTES
Peripheral Block    Pre-sedation assessment completed: 7/27/2023 8:18 AM    Patient reassessed immediately prior to procedure    Patient location during procedure: pre-op  Start time: 7/27/2023 8:18 AM  Stop time: 7/27/2023 8:24 AM  Reason for block: at surgeon's request and post-op pain management  Performed by  Anesthesiologist: Sae Matta MD  Preanesthetic Checklist  Completed: patient identified, IV checked, site marked, risks and benefits discussed, surgical consent, monitors and equipment checked, pre-op evaluation and timeout performed  Prep:  Pt Position: supine (HOB elevated)  Sterile barriers:cap, washed/disinfected hands, sterile barriers, gloves, mask, partial drape and alcohol skin prep  Prep: ChloraPrep  Patient monitoring: blood pressure monitoring, continuous pulse oximetry and EKG  Procedure    Sedation: yes  Performed under: local infiltration  Guidance:ultrasound guided and nerve stimulator    ULTRASOUND INTERPRETATION.  Using ultrasound guidance a 22 G gauge needle was placed in close proximity to the brachial plexus nerve, at which point, under ultrasound guidance anesthetic was injected in the area of the nerve and spread of the anesthesia was seen on ultrasound in close proximity thereto.  There were no abnormalities seen on ultrasound; a digital image was taken; and the patient tolerated the procedure with no complications. Images:still images obtained, printed/placed on chart  Loss of twitch: 0.5 mA  Laterality:right  Block Type:interscalene  Injection Technique:single-shot  Needle Type:echogenic  Needle Gauge:22 G (2in)  Resistance on Injection: none    Medications Used: bupivacaine-EPINEPHrine PF (MARCAINE w/EPI) 0.5% -[id number] injection - Injection, Interscalene   32 mL - 7/27/2023 8:18:00 AM      Medications  Comment:Precedex 50mcg added to above solution mixture    8cc of solution injected into musculoskeletal nerve     Post Assessment  Injection Assessment: negative aspiration for  heme, no paresthesia on injection and incremental injection  Patient Tolerance:comfortable throughout block  Complications:no  Additional Notes  The block or continuous infusion is requested by the referring physician for management of postoperative pain, or pain related to a procedure. Ultrasound guidance (deemed medically necessary). Painless injection, pt was awake and conversant during the procedure without complications. Needle and surrounding structures visualized throughout procedure. No adverse reactions or complications seen during this period. Post-procedure image showed no signs of complication, and anatomy was consistent with an uncomplicated nerve blockade.

## 2023-07-27 NOTE — H&P
"H and p      Chief Complaint  Follow-up of the Right Shoulder        Subjective          Kim Owen presents to [facility] for follow up of the right shoulder. She had a fall on 5/5/23.  She had a X ray on 5/8/23.  On 6/14/23 she had a MRI of the right shoulder.  She notes she has had popping of the shoulder for years.  She has very limited ROM and strength.       No Known Allergies      Social History   Social History           Socioeconomic History    Marital status:    Tobacco Use    Smoking status: Never    Smokeless tobacco: Never   Vaping Use    Vaping Use: Never used   Substance and Sexual Activity    Alcohol use: Not Currently    Drug use: Never    Sexual activity: Defer            Review of Systems            Objective      Vital Signs:   /80 (BP Location: Left arm, Patient Position: Sitting, Cuff Size: Large Adult)   Pulse 79   Ht 152.4 cm (60\")   Wt 102 kg (224 lb)   SpO2 94%   BMI 43.75 kg/m²        Physical Exam  Constitutional:       Appearance: Normal appearance. Patient is well-developed and normal weight.   HENT:      Head: Normocephalic.      Right Ear: Hearing and external ear normal.      Left Ear: Hearing and external ear normal.      Nose: Nose normal.   Eyes:      Conjunctiva/sclera: Conjunctivae normal.   Cardiovascular:      Rate and Rhythm: Normal rate.   Pulmonary:      Effort: Pulmonary effort is normal.      Breath sounds: No wheezing or rales.   Abdominal:      Palpations: Abdomen is soft.      Tenderness: There is no abdominal tenderness.   Musculoskeletal:      Cervical back: Normal range of motion.   Skin:     Findings: No rash.   Neurological:      Mental Status: Patient  is alert and oriented to person, place, and time.   Psychiatric:         Mood and Affect: Mood and affect normal.         Judgment: Judgment normal.         Ortho Exam       RIGHT SHOULDER Forward flexion 40. Abduction 30. External rotation 30. Internal rotation " SI joint. Negative Cross body adduction. Supraspinatus strength 2/5. Infraspinatus Strength 2/5. Infrared subscap 2/5. Negative Thornton. Negative Neer. Negative Apprehension. Negative Lift off. (Negative Obriens. Sensation intact to light touch, median, radial, ulnar nerve. Positive AIN, PIN, ulnar nerve motor. Positive pulses. Negative Impingement signs. Good strength in triceps, biceps, deltoid, wrist extensors and wrist flexors. Muscle atrophy of the shoulder.            Procedures           Imaging Results (Most Recent)         None                      Result Review    :            MRI Shoulder Right Without Contrast     Result Date: 6/14/2023  Narrative: PROCEDURE:       MRI SHOULDER RIGHT WO CONTRAST  COMPARISON:         [facility], [place] SHOULDER 2+ VW RIGHT, 5/08/2023, 11:32.  INDICATIONS:   RIGHT SHOULDER PAIN WITH LIMITED RANGE OF MOTION SINCE FALL X 5 WEEKS AGO. STAT PER REFERRING OFFICE.             TECHNIQUE:           A variety of imaging planes and parameters were utilized for visualization of suspected pathology.  Images were performed without contrast.   FINDINGS:       No fracture is identified.  Mild edema is noted in the humeral head, likely associated with insertional cysts at the rotator cuff insertion.  The humeral head is subluxed superiorly approximately 0.6 cm.  Mild acromioclavicular osteoarthritis is noted.  A moderate AC joint effusion is noted.  A type 3 acromion is present.  There is a full-thickness tear of the supraspinatus tendon which is retracted 2.4 cm.  There is a full-thickness tear of the infra spinatus tendon which is retracted 2.9 cm.  The resulting gap measures about 4.6 cm AP.  Moderate subscapularis tendinopathy is noted.  There is partial thickness tear involving the superior fibers of the subscapularis which extends approximately 80% of tendon thickness.  There is mild atrophy of the shoulder girdle musculature.  The biceps tendon insertion onto the  superior labrum remains intact.  There is a split tear of the tendon.  There is a tear of the mid anterior labrum.  There is separation of the anterosuperior labrum from the underlying glenoid which may represent a sublabral foramen or, less likely, posttraumatic detachment.  The posterosuperior labrum appears diminutive and irregular, likely representing chronic degenerative changes plus or minus superimposed degenerative tearing.  There is a possible small tear in the mid posterior labrum, seen only on 1 image.  There is a partial detachment of the inferior labrum.  A moderate glenohumeral joint effusion is noted.  Grade 2-3 chondromalacia is noted along the glenoid.  No loose body is seen.          Impression:      1. Massive rotator cuff tear involving the supraspinatus and infra spinatus tendons, as detailed above 2. Partial thickness articular surface tear of the subscapularis tendon extending approximately 80% of tendon thickness 3. Split tear of the intra-articular biceps tendon 4. Moderate glenohumeral joint effusion 5. Mild acromioclavicular and glenohumeral osteoarthritis      Fab Da Silva M.D.       Electronically Signed and Approved By: Fab Da Silva M.D. on 6/14/2023 at 13:27                         Assessment      Assessment and Plan      Diagnoses and all orders for this visit:     1. Tear of right rotator cuff, unspecified tear extent, unspecified whether traumatic (Primary)           Discussed the treatment plan with the patient. I reviewed the MRI results with the patient.      Discussed the treatment options with the patient, operative vs non-operative. The patient expressed understanding and wished to proceed with a Right Reverse Total Shoulder Arthroplasty            Discussed surgery., Risks/benefits discussed with patient including, but not limited to: infection, bleeding, neurovascular damage, re-rupture, aesthetic deformity, need for further surgery, and death., Discussed with patient the  implant type being used during surgery and patient understands., Surgery pamphlet given., Call or return if worsening symptoms., and DME order for a 3 in 1 given today due to patient will be confined to one room/level of the home that does not offer a toilet during postop recovery.      Follow Up      2 weeks postoperatively

## 2023-07-27 NOTE — ANESTHESIA PREPROCEDURE EVALUATION
Anesthesia Evaluation     Patient summary reviewed and Nursing notes reviewed   no history of anesthetic complications:   NPO Solid Status: > 8 hours  NPO Liquid Status: > 2 hours           Airway   Mallampati: III  TM distance: >3 FB  Neck ROM: full  No difficulty expected and Large neck circumference  Dental      Pulmonary - negative pulmonary ROS and normal exam    breath sounds clear to auscultation  Cardiovascular - normal exam  Exercise tolerance: good (4-7 METS)    ECG reviewed  Patient on routine beta blocker and Beta blocker given within 24 hours of surgery  Rhythm: regular  Rate: normal    (+) hypertensionhyperlipidemia      Neuro/Psych  (+) psychiatric history Anxiety and Depression  GI/Hepatic/Renal/Endo    (+) renal disease CRI, diabetes mellitus type 2, thyroid problem hypothyroidism    Musculoskeletal (-) negative ROS    Abdominal    Substance History - negative use     OB/GYN negative ob/gyn ROS         Other - negative ROS       ROS/Med Hx Other: >4METS, HX DM,RENAL INSUFF. K+ 3.1. TJR. KT                 Anesthesia Plan    ASA 3     general with block     (Patient understands anesthesia not responsible for dental damage.)  intravenous induction     Anesthetic plan, risks, benefits, and alternatives have been provided, discussed and informed consent has been obtained with: patient.    Use of blood products discussed with patient .    Plan discussed with CRNA.    CODE STATUS:

## 2023-07-27 NOTE — PLAN OF CARE
Goal Outcome Evaluation:  Plan of Care Reviewed With: patient           Outcome Evaluation: Patient presents with decreased strength, transfers and ambulation.  Skilled physical therapy services will be required to address these mobility deficits.      Anticipated Discharge Disposition (PT): home with outpatient therapy services

## 2023-07-28 VITALS
BODY MASS INDEX: 41.14 KG/M2 | DIASTOLIC BLOOD PRESSURE: 67 MMHG | HEIGHT: 62 IN | RESPIRATION RATE: 18 BRPM | OXYGEN SATURATION: 99 % | TEMPERATURE: 97.5 F | SYSTOLIC BLOOD PRESSURE: 149 MMHG | WEIGHT: 223.55 LBS | HEART RATE: 66 BPM

## 2023-07-28 LAB
ANION GAP SERPL CALCULATED.3IONS-SCNC: 13.4 MMOL/L (ref 5–15)
BUN SERPL-MCNC: 18 MG/DL (ref 8–23)
BUN/CREAT SERPL: 15.9 (ref 7–25)
CALCIUM SPEC-SCNC: 9.3 MG/DL (ref 8.6–10.5)
CHLORIDE SERPL-SCNC: 97 MMOL/L (ref 98–107)
CO2 SERPL-SCNC: 23.6 MMOL/L (ref 22–29)
CREAT SERPL-MCNC: 1.13 MG/DL (ref 0.57–1)
EGFRCR SERPLBLD CKD-EPI 2021: 51.8 ML/MIN/1.73
GLUCOSE SERPL-MCNC: 185 MG/DL (ref 65–99)
HCT VFR BLD AUTO: 37.8 % (ref 34–46.6)
HGB BLD-MCNC: 13.1 G/DL (ref 12–15.9)
MAGNESIUM SERPL-MCNC: 1.6 MG/DL (ref 1.6–2.4)
POTASSIUM SERPL-SCNC: 3.7 MMOL/L (ref 3.5–5.2)
SODIUM SERPL-SCNC: 134 MMOL/L (ref 136–145)

## 2023-07-28 PROCEDURE — A9270 NON-COVERED ITEM OR SERVICE: HCPCS | Performed by: ORTHOPAEDIC SURGERY

## 2023-07-28 PROCEDURE — 85018 HEMOGLOBIN: CPT | Performed by: ORTHOPAEDIC SURGERY

## 2023-07-28 PROCEDURE — 85014 HEMATOCRIT: CPT | Performed by: ORTHOPAEDIC SURGERY

## 2023-07-28 PROCEDURE — 63710000001 LEVOTHYROXINE 75 MCG TABLET: Performed by: INTERNAL MEDICINE

## 2023-07-28 PROCEDURE — A9270 NON-COVERED ITEM OR SERVICE: HCPCS | Performed by: INTERNAL MEDICINE

## 2023-07-28 PROCEDURE — 94799 UNLISTED PULMONARY SVC/PX: CPT

## 2023-07-28 PROCEDURE — 63710000001 OXYCODONE-ACETAMINOPHEN 5-325 MG TABLET: Performed by: ORTHOPAEDIC SURGERY

## 2023-07-28 PROCEDURE — 63710000001 FERROUS SULFATE 325 (65 FE) MG TABLET: Performed by: ORTHOPAEDIC SURGERY

## 2023-07-28 PROCEDURE — 94761 N-INVAS EAR/PLS OXIMETRY MLT: CPT

## 2023-07-28 PROCEDURE — 97535 SELF CARE MNGMENT TRAINING: CPT

## 2023-07-28 PROCEDURE — 97116 GAIT TRAINING THERAPY: CPT

## 2023-07-28 PROCEDURE — 83735 ASSAY OF MAGNESIUM: CPT | Performed by: INTERNAL MEDICINE

## 2023-07-28 PROCEDURE — 97165 OT EVAL LOW COMPLEX 30 MIN: CPT

## 2023-07-28 PROCEDURE — 63710000001 FAMOTIDINE 20 MG TABLET: Performed by: ORTHOPAEDIC SURGERY

## 2023-07-28 PROCEDURE — 63710000001 APIXABAN 2.5 MG TABLET: Performed by: ORTHOPAEDIC SURGERY

## 2023-07-28 PROCEDURE — 63710000001 METOPROLOL SUCCINATE XL 25 MG TABLET SUSTAINED-RELEASE 24 HOUR: Performed by: INTERNAL MEDICINE

## 2023-07-28 PROCEDURE — 63710000001 BUPROPION 100 MG TABLET: Performed by: INTERNAL MEDICINE

## 2023-07-28 PROCEDURE — 25010000002 CEFAZOLIN IN DEXTROSE 2-4 GM/100ML-% SOLUTION: Performed by: ORTHOPAEDIC SURGERY

## 2023-07-28 PROCEDURE — 80048 BASIC METABOLIC PNL TOTAL CA: CPT | Performed by: ORTHOPAEDIC SURGERY

## 2023-07-28 RX ORDER — ASPIRIN 325 MG
325 TABLET, DELAYED RELEASE (ENTERIC COATED) ORAL DAILY
Qty: 21 TABLET | Refills: 0 | Status: SHIPPED | OUTPATIENT
Start: 2023-08-05 | End: 2023-07-28 | Stop reason: SDUPTHER

## 2023-07-28 RX ORDER — OXYCODONE HYDROCHLORIDE AND ACETAMINOPHEN 5; 325 MG/1; MG/1
1 TABLET ORAL EVERY 4 HOURS PRN
Qty: 30 TABLET | Refills: 0 | Status: SHIPPED | OUTPATIENT
Start: 2023-07-28 | End: 2023-07-31 | Stop reason: SDUPTHER

## 2023-07-28 RX ORDER — ASPIRIN 325 MG
325 TABLET, DELAYED RELEASE (ENTERIC COATED) ORAL DAILY
Qty: 30 TABLET | Refills: 0 | Status: SHIPPED | OUTPATIENT
Start: 2023-07-28

## 2023-07-28 RX ORDER — ASPIRIN 325 MG
325 TABLET, DELAYED RELEASE (ENTERIC COATED) ORAL DAILY
Qty: 30 TABLET | Refills: 0 | Status: SHIPPED | OUTPATIENT
Start: 2023-08-05 | End: 2023-07-28 | Stop reason: SDUPTHER

## 2023-07-28 RX ORDER — AMOXICILLIN 250 MG
2 CAPSULE ORAL 2 TIMES DAILY PRN
Qty: 20 TABLET | Refills: 0 | Status: SHIPPED | OUTPATIENT
Start: 2023-07-28

## 2023-07-28 RX ORDER — MAGNESIUM SULFATE HEPTAHYDRATE 40 MG/ML
2 INJECTION, SOLUTION INTRAVENOUS ONCE
Status: DISCONTINUED | OUTPATIENT
Start: 2023-07-28 | End: 2023-07-28 | Stop reason: HOSPADM

## 2023-07-28 RX ADMIN — OXYCODONE AND ACETAMINOPHEN 1 TABLET: 5; 325 TABLET ORAL at 10:09

## 2023-07-28 RX ADMIN — METOPROLOL SUCCINATE 25 MG: 25 TABLET, EXTENDED RELEASE ORAL at 09:02

## 2023-07-28 RX ADMIN — LEVOTHYROXINE SODIUM 25 MCG: 75 TABLET ORAL at 06:27

## 2023-07-28 RX ADMIN — FERROUS SULFATE TAB 325 MG (65 MG ELEMENTAL FE) 325 MG: 325 (65 FE) TAB at 09:02

## 2023-07-28 RX ADMIN — OXYCODONE AND ACETAMINOPHEN 1 TABLET: 5; 325 TABLET ORAL at 03:33

## 2023-07-28 RX ADMIN — BUPROPION HYDROCHLORIDE 100 MG: 100 TABLET, FILM COATED ORAL at 09:02

## 2023-07-28 RX ADMIN — Medication 3 ML: at 09:02

## 2023-07-28 RX ADMIN — APIXABAN 2.5 MG: 2.5 TABLET, FILM COATED ORAL at 09:02

## 2023-07-28 RX ADMIN — FAMOTIDINE 40 MG: 20 TABLET ORAL at 09:02

## 2023-07-28 RX ADMIN — CEFAZOLIN SODIUM 2 G: 2 INJECTION, SOLUTION INTRAVENOUS at 00:16

## 2023-07-28 NOTE — THERAPY EVALUATION
Patient Name: Kim Owen  : 1950    MRN: [id number]                              Today's Date: 2023       Admit Date: 2023    Visit Dx:     ICD-10-CM ICD-9-CM   1. Difficulty in walking  R26.2 719.7   2. Tear of right rotator cuff, unspecified tear extent, unspecified whether traumatic  M75.101 840.4   3. Rotator cuff arthropathy of right shoulder  M12.811 716.81   4. Decreased activities of daily living (ADL)  Z78.9 V49.89     Patient Active Problem List   Diagnosis    Essential (primary) hypertension    Hyperlipidemia, unspecified    Hypothyroidism, unspecified    Other specified disorders of bone density and structure, unspecified site    Vitamin D deficiency, unspecified    Major depressive disorder, recurrent, moderate    Primary generalized (osteo)arthritis    Spinal stenosis, lumbar region without neurogenic claudication    Chronic pain    Low back pain    Other long term (current) drug therapy    Spondylosis without myelopathy    Renal insufficiency    Degeneration of lumbar intervertebral disc    Anxiety    S/P lumbar fusion    Type 2 diabetes mellitus without complication, without long-term current use of insulin    Osteoarthrosis, localized, primary, knee, left    Rotator cuff arthropathy of right shoulder    Tear of right rotator cuff     Past Medical History:   Diagnosis Date    Anesthesia     STATES CAN BE SLOW TO WAKE UP AT TIMES    Chronic pain     BACK, NO LONGER SEES PM CLINIC    Diabetes     DOES NOT CHECK BS DAILY    Essential (primary) hypertension     Hyperlipidemia, unspecified     Hypothyroidism, unspecified     Low back pain     Major depressive disorder, recurrent, moderate     Primary generalized (osteo)arthritis     L KNEE    [place] spotted fever     DX  NO CURRENT S/S    Spinal stenosis, lumbar region without neurogenic claudication     Torn rotator cuff     RIGHT    Vitamin D deficiency, unspecified     [place] without complications      Past Surgical  History:   Procedure Laterality Date    COLONOSCOPY  2012    HYSTERECTOMY      AGE 48  ENDOMETRIOSIS, MENORRHAGIA    JOINT REPLACEMENT Right 2016    KNEE    SPINE SURGERY      L5-S1    DR. DEAN HONEYCUTT  5/21    TOTAL KNEE ARTHROPLASTY Left 1/10/2023    Procedure: LEFT TOTAL KNEE ARTHROPLASTY WITH JANET ROBOT;  Surgeon: Lexa Ruano MD;  Location: [facility] MAIN OR;  Service: Orthopedics;  Laterality: Left;      General Information       Row Name 07/28/23 1042 07/28/23 1029       OT Time and Intention    Document Type therapy note (daily note)  -ES evaluation  -ES    Mode of Treatment individual therapy;occupational therapy  -ES individual therapy;occupational therapy  -ES      Row Name 07/28/23 1042 07/28/23 1029       General Information    Patient Profile Reviewed -- yes  -ES    Prior Level of Function -- ADL's;all household mobility;community mobility  Patient independent with B and IADLs at baseline. No device for functional mobility. Step over tub, standing shower completion. Shower chair if needed. Standard commode. No home O2. Denies recent falls.  -ES    Existing Precautions/Restrictions fall;non-weight bearing;right;shoulder  -ES fall;non-weight bearing;right;shoulder  Nonweightbearing right upper extremity, no external rotation, no shoulder flexion>90 °, no internal rotation >10°.  Shoulder immobilizer donned at all times.  -ES    Barriers to Rehab -- none identified  -ES      Row Name 07/28/23 1029          Occupational Profile    Reason for Services/Referral (Occupational Profile) Patient is 73 yr old female admitted to [facility] on 7/27/2023 after mechanical fall at home resulting in right rotator cuff tear. Patient is POD 1 right reverse total shoulder arthroplasty, NWB RUE with immobilizer donned at all times. OT evaluation and treatment ordered d/t recent decline in ADLs/transfer ability and discharge planning recommendations. No previous OT services for current condition.  -ES       Row  Name 07/28/23 1029          Living Environment    People in Home spouse  -ES       Row Name 07/28/23 1029          Home Main Entrance    Number of Stairs, Main Entrance none;other (see comments)  ramp to main entrance  -ES       Row Name 07/28/23 1042 07/28/23 1029       Cognition    Orientation Status (Cognition) --  Patient receptive to all education and training provided. All questions addressed.  -ES oriented x 3  Patient pleasant and cooperative, agreeable to therapy evaluation.  -ES      Row Name 07/28/23 1029          Safety Issues, Functional Mobility    Impairments Affecting Function (Mobility) pain;range of motion (ROM);strength  -ES               User Key  (r) = Recorded By, (t) = Taken By, (c) = Cosigned By      Initials Name Provider Type    Abbey Zuniga, OTR/L, CSRS Occupational Therapist                     Mobility/ADL's       Row Name 07/28/23 1037          Bed Mobility    Bed Mobility bed mobility (all) activities  -ES     All Activities, [place] (Bed Mobility) not tested  -ES       Row Name 07/28/23 1043 07/28/23 1037       Transfers    Comment, (Transfers) Patient provided education and training on hand placement and body mechanics with functional transfers to ensure patient compliance with shoulder precautions  -ES not tested. Patient met seated in recliner at therapy arrival to room.  -ES      Row Name 07/28/23 1037          Sit-Stand Transfer    Sit-Stand [place] (Transfers) standby assist  -ES     Assistive Device (Sit-Stand Transfers) other (see comments)  no assistive device  -ES       Row Name 07/28/23 1037          Stand-Sit Transfer    Stand-Sit [place] (Transfers) standby assist  -ES       Row Name 07/28/23 1037          Functional Mobility    Functional Mobility- Ind. Level standby assist  -ES     Functional Mobility- Device other (see comments)  no assistive device  -ES       Row Name 07/28/23 1043 07/28/23 1037       Activities of Daily Living    BADL  Assessment/Intervention --  Patient requires maximum assist to don right shoulder immobilizer. Immobilizer adjusted and modified for proper fit and patient comfort. Patient educated and trained on wear schedule, donning/doffing immobilizer, shoulder precautions, and NWB status.  -ES bathing;upper body dressing;lower body dressing;feeding;grooming;toileting  -ES      Row Name 07/28/23 1043 07/28/23 1037       Bathing Assessment/Intervention    [place] Level (Bathing) -- bathing skills;minimum assist (75% patient effort)  -ES    Comment, (Bathing) Patient provided education and training on adaptive upper body bathing strategeis to increase patient independence with ADLs at baseline. Patient provides return demonstration for ensured understanding.  -ES --      Row Name 07/28/23 1043 07/28/23 1037       Upper Body Dressing Assessment/Training    [place] Level (Upper Body Dressing) upper body dressing skills;don;pull-over garment  -ES upper body dressing skills;minimum assist (75% patient effort)  -ES    Position (Upper Body Dressing) unsupported sitting  -ES --    Comment, (Upper Body Dressing) Patient provided education and training on adaptive upper body dressing strategeis to increase patient independence with ADLs at baseline. Patient provides return demonstration for ensured understanding.  -ES --      Row Name 07/28/23 1043 07/28/23 1037       Lower Body Dressing Assessment/Training    [place] Level (Lower Body Dressing) lower body dressing skills;don;pants/bottoms;minimum assist (75% patient effort)  -ES lower body dressing skills;minimum assist (75% patient effort)  -ES    Position (Lower Body Dressing) unsupported sitting;supported standing  -ES --    Comment, (Lower Body Dressing) Patient provided education and training on adaptive lower body dressing strategeis to increase patient independence with ADLs at baseline. Patient provides return demonstration for ensured understanding.  -ES --       [facility] Name 07/28/23 1037          Self-Feeding Assessment/Training    [place] Level (Feeding) feeding skills;set up  -ES       [facility] Name 07/28/23 1037          Grooming Assessment/Training    [place] Level (Grooming) grooming skills;set up  -ES       Row Name 07/28/23 1037          Toileting Assessment/Training    [place] Level (Toileting) toileting skills;contact guard assist  -ES               User Key  (r) = Recorded By, (t) = Taken By, (c) = Cosigned By      Initials Name Provider Type    ES Abbey Mathews, OTR/L, CSRS Occupational Therapist                   Obj/Interventions       Row Name 07/28/23 1038          Sensory Assessment (Somatosensory)    Sensory Assessment Patient endorses impaired RUE sensation, block has not worn off from surgery.  -ES       Row Name 07/28/23 1038          Vision Assessment/Intervention    Visual Impairment/Limitations WFL;corrective lenses full-time  -ES       Row Name 07/28/23 1038          Range of Motion Comprehensive    Comment, General Range of Motion LUE AROM WFL. RUE distal AROM WFL with proximal AROM deferred for shoulder precautions  -ES       Row Name 07/28/23 1038          Strength Comprehensive (MMT)    Comment, General Manual Muscle Testing (MMT) Assessment bilateral grasps 4/5 with further testing deferred  -[facility] Name 07/28/23 1045          Shoulder (Therapeutic Exercise)    Shoulder (Therapeutic Exercise) pendulum exercises  -ES     Shoulder Pendulum Exercises (Therapeutic Exercise) right  Patient educated on pendulum exercises.  Demonstration provided for sitting/standing completion.  Patient instructed to complete 5-6 times a day in supported seated or standing position.  -ES       Row Name 07/28/23 1045 07/28/23 1038       Motor Skills    Motor Skills -- coordination;functional endurance  -ES    Coordination -- WFL  -ES    Functional Endurance -- fair  -ES    Therapeutic Exercise shoulder  -ES --      Row Name 07/28/23 1045 07/28/23 1038        Balance    Balance Assessment -- sitting dynamic balance;standing dynamic balance  -ES    Dynamic Sitting Balance -- standby assist  -ES    Position, Sitting Balance -- unsupported  -ES    Dynamic Standing Balance -- standby assist  -ES    Position/Device Used, Standing Balance -- unsupported  -ES    Balance Interventions sitting;standing;sit to stand;occupation based/functional task  -ES --              User Key  (r) = Recorded By, (t) = Taken By, (c) = Cosigned By      Initials Name Provider Type    ES Abbey Mathews, OTR/L, CSRS Occupational Therapist                   Goals/Plan       Row Name 07/28/23 1040          Transfer Goal 1 (OT)    Activity/Assistive Device (Transfer Goal 1, OT) transfers, all  -ES     [place] Level/Cues Needed (Transfer Goal 1, OT) independent  -ES     Time Frame (Transfer Goal 1, OT) long term goal (LTG);10 days  -ES       Row Name 07/28/23 1040          Bathing Goal 1 (OT)    Activity/Device (Bathing Goal 1, OT) bathing skills, all  -ES     [place] Level/Cues Needed (Bathing Goal 1, OT) modified independence  -ES     Time Frame (Bathing Goal 1, OT) long term goal (LTG);10 days  -ES       Row Name 07/28/23 1040          Dressing Goal 1 (OT)    Activity/Device (Dressing Goal 1, OT) dressing skills, all  -ES     [place]/Cues Needed (Dressing Goal 1, OT) modified independence  -ES     Time Frame (Dressing Goal 1, OT) long term goal (LTG);10 days  -ES       Row Name 07/28/23 1040          Toileting Goal 1 (OT)    Activity/Device (Toileting Goal 1, OT) toileting skills, all  -ES     [place] Level/Cues Needed (Toileting Goal 1, OT) modified independence  -ES     Time Frame (Toileting Goal 1, OT) long term goal (LTG);10 days  -ES       Row Name 07/28/23 1040          Grooming Goal 1 (OT)    Activity/Device (Grooming Goal 1, OT) grooming skills, all  -ES     [place] (Grooming Goal 1, OT) modified independence  -ES     Time Frame (Grooming Goal 1, OT) long term goal  (LTG);10 days  -ES       Row Name 07/28/23 1040          Therapy Assessment/Plan (OT)    Planned Therapy Interventions (OT) activity tolerance training;BADL retraining;functional balance retraining;occupation/activity based interventions;patient/caregiver education/training;ROM/therapeutic exercise  -ES               User Key  (r) = Recorded By, (t) = Taken By, (c) = Cosigned By      Initials Name Provider Type    ES Abbey Mathews, OTR/L, CSRS Occupational Therapist                   Clinical Impression       Row Name 07/28/23 1046 07/28/23 1040       Plan of Care Review    Plan of Care Reviewed With -- patient;spouse  -ES    Progress -- no change  -ES    Outcome Evaluation Patient provided education and training on use of immobilizer management, immobilizer wear schedule, adaptive strategies to increase patient safety and independence with B and IADL task engagement, transfer training to maxamize patient safety with all functional transfers, and home modification for patient success and independence at time of discharge. Patient receptive to all education and training provided.  -ES Patient has experienced decline in function from baseline status, presenting w/ deficits related to range of motion, strength, safety awareness, and self care management that impede patient independence with activities of daily living.  Patient would benefit from skilled Occupational Therapy intervention to maxamize patient safety, and promote return to baseline independence.  -ES      Row Name 07/28/23 1040          Therapy Assessment/Plan (OT)    Rehab Potential (OT) good, to achieve stated therapy goals  -ES     Criteria for Skilled Therapeutic Interventions Met (OT) yes;meets criteria;skilled treatment is necessary  -ES     Therapy Frequency (OT) 5 times/wk  -ES       Row Name 07/28/23 1040          Therapy Plan Review/Discharge Plan (OT)    Anticipated Discharge Disposition (OT) home with outpatient therapy services  -ES                User Key  (r) = Recorded By, (t) = Taken By, (c) = Cosigned By      Initials Name Provider Type    ES Abbey Mathews, OTR/L, CSRS Occupational Therapist                   Outcome Measures       Row Name 07/28/23 1041          How much help from another is currently needed...    Putting on and taking off regular lower body clothing? 3  -ES     Bathing (including washing, rinsing, and drying) 3  -ES     Toileting (which includes using toilet bed pan or urinal) 3  -ES     Putting on and taking off regular upper body clothing 3  -ES     Taking care of personal grooming (such as brushing teeth) 3  -ES     Eating meals 4  -ES     AM-PAC 6 Clicks Score (OT) 19  -ES       Row Name 07/28/23 1000 07/28/23 0737       How much help from another person do you currently need...    Turning from your back to your side while in flat bed without using bedrails? 3  -RH 3  -BA    Moving from lying on back to sitting on the side of a flat bed without bedrails? 3  -RH 3  -BA    Moving to and from a bed to a chair (including a wheelchair)? 3  -RH 3  -BA    Standing up from a chair using your arms (e.g., wheelchair, bedside chair)? 3  -RH 3  -BA    Climbing 3-5 steps with a railing? 3  -RH 3  -BA    To walk in hospital room? 3  -RH 3  -BA    AM-PAC 6 Clicks Score (PT) 18  -RH 18  -BA    Highest level of mobility 6 --> Walked 10 steps or more  -RH 6 --> Walked 10 steps or more  -BA      Row Name 07/28/23 1041          Functional Assessment    Outcome Measure Options AM-PAC 6 Clicks Daily Activity (OT);Optimal Instrument  -ES       Row Name 07/28/23 1041          Optimal Instrument    Optimal Instrument Optimal - 3  -ES     Bending/Stooping 1  -ES     Standing 1  -ES     Reaching 2  -ES     From the list, choose the 3 activities you would most like to be able to do without any difficulty Bending/stooping;Standing;Reaching  -ES     Total Score Optimal - 3 4  -ES               User Key  (r) = Recorded By, (t) = Taken By, (c) = Cosigned By       Initials Name Provider Type    Alondra Pinon, RN Registered Nurse    Kalyan Fang, VILLA Physical Therapist Assistant    Abbey Zuniga OTR/L, SHRADDHA Occupational Therapist                    Occupational Therapy Education       Title: PT OT SLP Therapies (Done)       Topic: Occupational Therapy (Done)       Point: ADL training (Done)       Description:   Instruct learner(s) on proper safety adaptation and remediation techniques during self care or transfers.   Instruct in proper use of assistive devices.                  Learning Progress Summary             Patient Acceptance, E,TB, VU by FLORENCE at 7/28/2023 1042   Significant Other Acceptance, E,TB, VU by ES at 7/28/2023 1042                         Point: Home exercise program (Done)       Description:   Instruct learner(s) on appropriate technique for monitoring, assisting and/or progressing therapeutic exercises/activities.                  Learning Progress Summary             Patient Acceptance, E,TB, VU by FLORENCE at 7/28/2023 1042   Significant Other Acceptance, E,TB, VU by ES at 7/28/2023 1042                         Point: Precautions (Done)       Description:   Instruct learner(s) on prescribed precautions during self-care and functional transfers.                  Learning Progress Summary             Patient Acceptance, E,TB, VU by FLORENCE at 7/28/2023 1042   Significant Other Acceptance, E,TB, VU by  at 7/28/2023 1042                         Point: Body mechanics (Done)       Description:   Instruct learner(s) on proper positioning and spine alignment during self-care, functional mobility activities and/or exercises.                  Learning Progress Summary             Patient Acceptance, E,TB, VU by FLORENCE at 7/28/2023 1042   Significant Other Acceptance, E,TB, VU by  at 7/28/2023 1042                                         User Key       Initials Effective Dates Name Provider Type Discipline     02/22/23 -  Abbey Mathews OTR/ROSEMARIE, CLAUSS  Occupational Therapist OT                  OT Recommendation and Plan  Planned Therapy Interventions (OT): activity tolerance training, BADL retraining, functional balance retraining, occupation/activity based interventions, patient/caregiver education/training, ROM/therapeutic exercise  Therapy Frequency (OT): 5 times/wk  Plan of Care Review  Plan of Care Reviewed With: patient, spouse  Progress: no change  Outcome Evaluation: Patient provided education and training on use of immobilizer management, immobilizer wear schedule, adaptive strategies to increase patient safety and independence with B and IADL task engagement, transfer training to [facility] safety with all functional transfers, and home modification for patient success and independence at time of discharge. Patient receptive to all education and training provided.     Time Calculation:   Evaluation Complexity (OT)  Review Occupational Profile/Medical/Therapy History Complexity: brief/low complexity  Assessment, Occupational Performance/Identification of Deficit Complexity: 3-5 performance deficits  Clinical Decision Making Complexity (OT): problem focused assessment/low complexity  Overall Complexity of Evaluation (OT): low complexity     Time Calculation- OT       Row Name 07/28/23 1042 07/28/23 0959          Time Calculation- OT    OT Received On 07/28/23  -ES --     OT Goal Re-Cert Due Date 08/06/23  -ES --        Timed Charges    45267 - Gait Training Minutes  -- 6  -RH     80341 - OT Self Care/Mgmt Minutes 30  -ES --        Untimed Charges    OT Eval/Re-eval Minutes 18  -ES --        Total Minutes    Timed Charges Total Minutes 30  -ES 6  -RH     Untimed Charges Total Minutes 18  -ES --      Total Minutes 48  -ES 6  -RH               User Key  (r) = Recorded By, (t) = Taken By, (c) = Cosigned By      Initials Name Provider Type    RH Kalyan Topete, PTA Physical Therapist Assistant    ES Abbey Mathews, OTR/L, CSRS Occupational Therapist                   Therapy Charges for Today       Code Description Service Date Service Provider Modifiers Qty    65893713028 HC OT SELF CARE/MGMT/TRAIN EA 15 MIN 7/28/2023 Abbey Mathews OTR/L, CSRS GO 2    [id number]  OT EVAL LOW COMPLEXITY 2 7/28/2023 Abbey Mathews OTR/L, CSRS GO 1                 Abbey Mathews OTR/L, CSRS  7/28/2023

## 2023-07-28 NOTE — PLAN OF CARE
Goal Outcome Evaluation:  Plan of Care Reviewed With: patient           Outcome Evaluation: pt medicated for right shoulder pain x2 with voiced relief. pt up to bathroom with 1 assist/gait belt, voiding without difficulty. no changes in pt's status.

## 2023-07-28 NOTE — DISCHARGE SUMMARY
[facility]          HOSPITALIST  PROGRESS NOTE/DISCHARGE SUMMARY    Patient Name: Kim Owen  : 1950  MRN: [id number]    Date of Admission: 2023  Date of Discharge:  23  Primary Care Physician: Lindy Chavarria APRN    Consultants:  -Orthopedic Surgery: Dr. Lexa Ruano    Hospital Problems:  Right rotator cuff tear s/p right total shoulder arthroplasty  Hypomagnesemia, new  Essential hypertension  Hyperlipidemia  Hypothyroidism  Depression  Obesity (BMI: 40.89)    Hospital Course   Interval Course:  No acute events overnight.  Patient's pain being well controlled.  Patient working well with PT/OT services.  Patient denies any chest pain or shortness of breath.  Nursing with no additional acute issues to report.    Review of Systems:  All systems reviewed and negative unless stated otherwise under subjective.    Plan:  -Replace magnesium IV  -Resume home chlorthalidone, losartan, metoprolol for hypertension  -Resume home levothyroxine for hypothyroidism  -Resume home atorvastatin for hyperlipidemia  -Resume home bupropion for depression  -Pain management with as needed percocet per Orthopedic Surgery  -DVT prophylaxis with Aspirin 325mg daily per Orthopedic Surgery  -Follow-up with Orthopedic Surgery in 2 weeks    Hospital Course:  Kim Owen is a 73 y.o. female with essential hypertension, hypothyroidism, hyperlipidemia, depression and obesity (BMI: 40.89) that presented for scheduled right total shoulder arthroplasty.  Patient sustained a fall in May 2023 and subsequently developed right shoulder pain.  X-ray was obtained and no acute abnormalities were noted.  Patient did attend physical therapy but continued to have pain and discomfort which actually worsened some.  On 2023 patient had MRI done of the right shoulder that demonstrated tear of right rotator cuff.  Patient continued to experience pain and weakness.  Decision was made to pursue right total  shoulder arthroplasty.  Hospitalist service contacted for assistance in management of patient's essential hypertension, hypothyroidism, hyperlipidemia and depression following procedure.  Magnesium was noted be low on a.m. labs and replaced via IV.  Patient is to resume home chlorthalidone, losartan and metoprolol for hypertension, levothyroxine for hypothyroidism, atorvastatin for hyperlipidemia and bupropion for depression.  Pain management will be with as needed percocet per Orthopedic Surgery, Aspirin 325mg daily for DVT prophylaxis per orthopedic surgery.  Patient hemodynamically stable and no additional inpatient evaluation or work-up necessary at this time, she will discharge home with outpatient therapy and is to follow-up with Orthopedic Surgery in 2 weeks.    DISCHARGE Follow Up Recommendations for labs and diagnostics:   -Follow-up with Orthopedic Surgery in 2 weeks    Day of Discharge     Vital Signs:  Temp:  [97.3 °F (36.3 °C)-97.7 °F (36.5 °C)] 97.5 °F (36.4 °C)  Heart Rate:  [65-88] 66  Resp:  [16-20] 18  BP: (109-153)/(54-76) 149/67  Flow (L/min):  [2-3.5] 2  Physical Exam:   Gen: No acute distress, Conversant, Pleasant, sitting up in chair bedside eating breakfast  Resp: CTAB, No w/r/r, No respiratory distress appreciated  Card: RRR, No m/r/g  Abd: Soft, Nontender, Nondistended, + bowel sounds    Discharge Details        Discharge Medications        New Medications        Instructions Start Date   aspirin 325 MG EC tablet   325 mg, Oral, Daily      oxyCODONE-acetaminophen 5-325 MG per tablet  Commonly known as: PERCOCET   1 tablet, Oral, Every 4 Hours PRN      sennosides-docusate 8.6-50 MG per tablet  Commonly known as: PERICOLACE   2 tablets, Oral, 2 Times Daily PRN             Continue These Medications        Instructions Start Date   atorvastatin 10 MG tablet  Commonly known as: LIPITOR   10 mg, Oral, Every Night at Bedtime      buPROPion 100 MG tablet  Commonly known as: WELLBUTRIN   100 mg,  Oral, Daily      chlorthalidone 25 MG tablet  Commonly known as: HYGROTON   25 mg, Oral, Daily      levothyroxine 25 MCG tablet  Commonly known as: SYNTHROID, LEVOTHROID   25 mcg, Oral, Daily      losartan 100 MG tablet  Commonly known as: COZAAR   100 mg, Oral, Daily      metFORMIN  MG 24 hr tablet  Commonly known as: GLUCOPHAGE-XR   500 mg, Oral, 2 Times Daily      metoprolol succinate XL 25 MG 24 hr tablet  Commonly known as: TOPROL-XL   25 mg, Oral, Daily             Stop These Medications      ibuprofen 800 MG tablet  Commonly known as: ADVIL,MOTRIN     meloxicam 15 MG tablet  Commonly known as: MOBIC              Allergies   Allergen Reactions    Adhesive Tape Rash     BLISTERS/RASH TO SKIN W/TKA 1/2023       Discharge Disposition:  Home or Self Care    Diet:  Hospital:  Diet Order   Procedures    Diet: Cardiac Diets; Healthy Heart (2-3 Na+); Texture: Regular Texture (IDDSI 7); Fluid Consistency: Thin (IDDSI 0)       Discharge Activity:       CODE STATUS:  There are no questions and answers to display.       Future Appointments   Date Time Provider Department Center   8/9/2023 10:00 AM Ammy Serna APRN [facility] [place] R[place] [facility]   12/21/2023  9:15 AM Lindy Chavarria APRN [facility] [place] BARDS [facility]   1/17/2024  9:00 AM Venita Sprague PA-C [facility] WOOD [facility]       Additional Instructions for the Follow-ups that You Need to Schedule       Discharge Follow-up with Specified Provider: Dr. Lexa Ruano; 2 Weeks   As directed      To: Dr. Lexa Ruano   Follow Up: 2 Weeks                Pertinent  and/or Most Recent Results     PROCEDURES:   -Right total shoulder arthroplasty (07/27/2023)    RADIOLOGY:   XR Shoulder 1 View Right [[id number]] Rico as Reviewed   Order Status: Completed Collected: 07/27/23 1024    Updated: 07/27/23 1027   Narrative:     PROCEDURE: XR SHOULDER 1 VW RIGHT     COMPARISON: [facility], XR SHOULDER 2+ VW RIGHT, 5/08/2023, 11:32.     INDICATIONS: r tsa     FINDINGS:  Reverse type right  shoulder prosthesis is in place.  No fractures are visualized.     Skin clips are evident.     Subsegmental atelectasis is seen at the right lung base.      Impression:       Reverse type right shoulder prosthesis in place.               LÓPEZ AVALOS MD        Electronically Signed and Approved By: LÓPEZ AVALOS MD on 7/27/2023 at 10:24         LAB RESULTS:      Lab 07/28/23  0426   HEMOGLOBIN 13.1   HEMATOCRIT 37.8         Lab 07/28/23  0426   SODIUM 134*   POTASSIUM 3.7   CHLORIDE 97*   CO2 23.6   ANION GAP 13.4   BUN 18   CREATININE 1.13*   EGFR 51.8*   GLUCOSE 185*   CALCIUM 9.3   MAGNESIUM 1.6                             Brief Urine Lab Results  (Last result in the past 365 days)        Color   Clarity   Blood   Leuk Est   Nitrite   Protein   CREAT   Urine HCG        06/14/23 0859             43.7               Microbiology Results (last 10 days)       ** No results found for the last 240 hours. **              Time spent personally reviewing patients labs, imaging and discussing care plan with patient, nurse at bedside and consultants (Orthopedic Surgery): 32 minutes.    Electronically signed by Sonido Brand MD, 07/28/23, 8:19 AM EDT.

## 2023-07-28 NOTE — THERAPY TREATMENT NOTE
Acute Care - Physical Therapy Treatment Note  [facility]     Patient Name: Kim Owen  : 1950  MRN: [id number]  Today's Date: 2023      Visit Dx:     ICD-10-CM ICD-9-CM   1. Difficulty in walking  R26.2 719.7   2. Tear of right rotator cuff, unspecified tear extent, unspecified whether traumatic  M75.101 840.4     Patient Active Problem List   Diagnosis    Essential (primary) hypertension    Hyperlipidemia, unspecified    Hypothyroidism, unspecified    Other specified disorders of bone density and structure, unspecified site    Vitamin D deficiency, unspecified    Major depressive disorder, recurrent, moderate    Primary generalized (osteo)arthritis    Spinal stenosis, lumbar region without neurogenic claudication    Chronic pain    Low back pain    Other long term (current) drug therapy    Spondylosis without myelopathy    Renal insufficiency    Degeneration of lumbar intervertebral disc    Anxiety    S/P lumbar fusion    Type 2 diabetes mellitus without complication, without long-term current use of insulin    Osteoarthrosis, localized, primary, knee, left    Rotator cuff arthropathy of right shoulder    Tear of right rotator cuff     Past Medical History:   Diagnosis Date    Anesthesia     STATES CAN BE SLOW TO WAKE UP AT TIMES    Chronic pain     BACK, NO LONGER SEES PM CLINIC    Diabetes     DOES NOT CHECK BS DAILY    Essential (primary) hypertension     Hyperlipidemia, unspecified     Hypothyroidism, unspecified     Low back pain     Major depressive disorder, recurrent, moderate     Primary generalized (osteo)arthritis     L KNEE    Michael Mountain spotted fever     DX  NO CURRENT S/S    Spinal stenosis, lumbar region without neurogenic claudication     Torn rotator cuff     RIGHT    Vitamin D deficiency, unspecified     Zoster without complications      Past Surgical History:   Procedure Laterality Date    COLONOSCOPY  2012    HYSTERECTOMY      AGE 48  ENDOMETRIOSIS, MENORRHAGIA    JOINT  REPLACEMENT Right 2016    KNEE    SPINE SURGERY      L5-S1    DR. DEAN HONEYCUTT  5/21    TOTAL KNEE ARTHROPLASTY Left 1/10/2023    Procedure: LEFT TOTAL KNEE ARTHROPLASTY WITH JANET ROBOT;  Surgeon: Lexa Ruano MD;  Location: [facility];  Service: Orthopedics;  Laterality: Left;     PT Assessment (last 12 hours)       PT Evaluation and Treatment       Row Name 07/28/23 0737          Physical Therapy Time and Intention    Subjective Information no complaints  -RH     Document Type therapy note (daily note)  -     Mode of Treatment physical therapy;individual therapy  -     Patient Effort good  -       Row Name 07/28/23 0737          Pain    Additional Documentation Pain Scale: FACES Pre/Post-Treatment (Group)  -       Row Name 07/28/23 0737          Pain Scale: FACES Pre/Post-Treatment    Pain: FACES Scale, Pretreatment 2-->hurts little bit  -RH     Posttreatment Pain Rating 2-->hurts little bit  -RH     Pain Location - Side/Orientation Right  -     Pain Location - shoulder  -       Row Name 07/28/23 0737          Transfers    Transfers sit-stand transfer;stand-sit transfer  -       Row Name 07/28/23 0737          Sit-Stand Transfer    Sit-Stand [place] (Transfers) contact guard  -     Assistive Device (Sit-Stand Transfers) --  Pt transfers without AD.  -     Comment, (Sit-Stand Transfer) Pt with R shoulder immobilizer in place.  -       Row Name 07/28/23 0737          Stand-Sit Transfer    Stand-Sit [place] (Transfers) contact guard  -       Row Name 07/28/23 0737          Gait/Stairs (Locomotion)    Gait/Stairs Locomotion gait/ambulation independence;gait/ambulation assistive device;distance ambulated;gait pattern;gait deviations  -     [place] Level (Gait) contact guard  -     Assistive Device (Gait) --  Pt amb without AD.  -     Distance in Feet (Gait) 175  -     Pattern (Gait) 2-point;step-through  -     Deviations/Abnormal Patterns (Gait) base of support,  narrow  -RH     Gait Assessment/Intervention Pt amb with CGA without AD with R shoulder immobilizerr in place.  Pt requires frequent cues to slow her pace for safety.  -RH       Row Name 07/28/23 0737          Balance    Dynamic Standing Balance contact guard  -RH       Row Name             Wound 07/27/23 0859 Right axilla Incision    Wound - Properties Group Placement Date: 07/27/23  -LB Placement Time: 0859  -LB Side: Right  -LB Location: axilla  -LB Primary Wound Type: Incision  -LB    Retired Wound - Properties Group Placement Date: 07/27/23  -LB Placement Time: 0859  -LB Side: Right  -LB Location: axilla  -LB Primary Wound Type: Incision  -LB    Retired Wound - Properties Group Date first assessed: 07/27/23  -LB Time first assessed: 0859  -LB Side: Right  -LB Location: axilla  -LB Primary Wound Type: Incision  -LB      Row Name 07/28/23 0737          Progress Summary (PT)    Progress Toward Functional Goals (PT) progress toward functional goals is good  -RH               User Key  (r) = Recorded By, (t) = Taken By, (c) = Cosigned By      Initials Name Provider Type     Thomas Fernández, RN Registered Nurse    Kalyan Fang PTA Physical Therapist Assistant                    Physical Therapy Education       Title: PT OT SLP Therapies (Done)       Topic: Physical Therapy (Done)       Point: Mobility training (Done)       Learning Progress Summary             Patient Acceptance, E,TB, VU by BA at 7/28/2023 0743    Acceptance, E,TB, VU,NR by EV at 7/27/2023 2216    Acceptance, E,TB, VU by DES at 7/27/2023 1440                         Point: Precautions (Done)       Learning Progress Summary             Patient Acceptance, E,TB, VU by BA at 7/28/2023 0743    Acceptance, E,TB, VU,NR by EV at 7/27/2023 2216    Acceptance, E,TB, VU by DES at 7/27/2023 1440                                         User Key       Initials Effective Dates Name Provider Type Discipline    EV 06/16/21 -  Marianne Mendes RN Registered  Nurse Nurse    DIMITRI 09/07/21 -  Alondra William RN Registered Nurse Nurse    DES 06/03/21 -  Gutierrez Atkinson, PT Physical Therapist PT                  PT Recommendation and Plan     Progress Summary (PT)  Progress Toward Functional Goals (PT): progress toward functional goals is good   Outcome Measures       Row Name 07/28/23 1000 07/27/23 1400          How much help from another person do you currently need...    Turning from your back to your side while in flat bed without using bedrails? 3  -RH 3  -DES     Moving from lying on back to sitting on the side of a flat bed without bedrails? 3  -RH 3  -DES     Moving to and from a bed to a chair (including a wheelchair)? 3  -RH 3  -DES     Standing up from a chair using your arms (e.g., wheelchair, bedside chair)? 3  -RH 3  -DES     Climbing 3-5 steps with a railing? 3  -RH 3  -DES     To walk in hospital room? 3  -RH 3  -DES     AM-PAC 6 Clicks Score (PT) 18  -RH 18  -DES        Functional Assessment    Outcome Measure Options -- AM-PAC 6 Clicks Basic Mobility (PT)  -DES               User Key  (r) = Recorded By, (t) = Taken By, (c) = Cosigned By      Initials Name Provider Type    RH Kalyan Topete PTA Physical Therapist Assistant    Gutierrez George, PT Physical Therapist                     Time Calculation:    PT Charges       Row Name 07/28/23 0959             Time Calculation    PT Received On 07/28/23  -RH         Timed Charges    77719 - Gait Training Minutes  6  -RH      73186 - PT Therapeutic Activity Minutes 5  -RH         Total Minutes    Timed Charges Total Minutes 11  -RH       Total Minutes 11  -RH                User Key  (r) = Recorded By, (t) = Taken By, (c) = Cosigned By      Initials Name Provider Type    RH Kalyan Topete PTA Physical Therapist Assistant                  Therapy Charges for Today       Code Description Service Date Service Provider Modifiers Qty    [id number] HC GAIT TRAINING EA 15 MIN 7/28/2023 Kalyan Topete PTA GP 1            PT  G-Codes  Outcome Measure Options: AM-PAC 6 Clicks Basic Mobility (PT)  AM-PAC 6 Clicks Score (PT): 18    Kalyan Topete, VILLA  7/28/2023

## 2023-07-28 NOTE — SIGNIFICANT NOTE
07/28/23 0745   Plan   Final Discharge Disposition Code 01 - home or self-care   Final Note Home with Outpatient [facility] at [facility]. Appt: 8/2/23 at 8am.

## 2023-07-31 ENCOUNTER — TELEPHONE (OUTPATIENT)
Dept: ORTHOPEDIC SURGERY | Facility: CLINIC | Age: 73
End: 2023-07-31
Payer: MEDICARE

## 2023-07-31 DIAGNOSIS — M12.811 ROTATOR CUFF ARTHROPATHY OF RIGHT SHOULDER: ICD-10-CM

## 2023-07-31 RX ORDER — OXYCODONE HYDROCHLORIDE AND ACETAMINOPHEN 5; 325 MG/1; MG/1
1 TABLET ORAL EVERY 4 HOURS PRN
Qty: 42 TABLET | Refills: 0 | Status: SHIPPED | OUTPATIENT
Start: 2023-07-31

## 2023-08-09 ENCOUNTER — OFFICE VISIT (OUTPATIENT)
Dept: ORTHOPEDIC SURGERY | Facility: CLINIC | Age: 73
End: 2023-08-09
Payer: MEDICARE

## 2023-08-09 VITALS
BODY MASS INDEX: 41.04 KG/M2 | DIASTOLIC BLOOD PRESSURE: 61 MMHG | HEIGHT: 62 IN | SYSTOLIC BLOOD PRESSURE: 145 MMHG | OXYGEN SATURATION: 94 % | HEART RATE: 84 BPM | WEIGHT: 223 LBS

## 2023-08-09 DIAGNOSIS — M12.811 ROTATOR CUFF ARTHROPATHY OF RIGHT SHOULDER: ICD-10-CM

## 2023-08-09 DIAGNOSIS — Z96.611 STATUS POST REVERSE TOTAL REPLACEMENT OF RIGHT SHOULDER: Primary | ICD-10-CM

## 2023-08-09 DIAGNOSIS — M25.511 RIGHT SHOULDER PAIN, UNSPECIFIED CHRONICITY: Primary | ICD-10-CM

## 2023-08-09 DIAGNOSIS — Z96.611 STATUS POST REVERSE TOTAL REPLACEMENT OF RIGHT SHOULDER: ICD-10-CM

## 2023-08-09 RX ORDER — OXYCODONE HYDROCHLORIDE AND ACETAMINOPHEN 5; 325 MG/1; MG/1
1 TABLET ORAL EVERY 4 HOURS PRN
Qty: 42 TABLET | Refills: 0 | Status: SHIPPED | OUTPATIENT
Start: 2023-08-09

## 2023-08-09 RX ORDER — TRAMADOL HYDROCHLORIDE 50 MG/1
TABLET ORAL
COMMUNITY

## 2023-08-09 RX ORDER — CYCLOBENZAPRINE HCL 5 MG
TABLET ORAL
COMMUNITY

## 2023-08-09 NOTE — PATIENT INSTRUCTIONS
X-rays taken and reviewed with patient showing intact hardware.      Sutures/staples removed in office today. Steri-strips applied. Patient educated on incision care. May shower, but do not submerge in water until fully healed. Do not apply creams or lotions. Allow steri-strips to fall off on their own in 7-10 days.     Continue sling for the next 4 weeks. May remove for hygiene.     Advised on pendulums, gentle range of motion exercises to the elbow, wrist, and hand/fingers. No active range of motion of the shoulder. No lifting, pushing, or pulling.     Order sent for PT/OT to start passive stretching/gentle range of motion only.     Follow-up in 4 weeks. No x-rays needed.   Call with any changes or concerns.

## 2023-08-09 NOTE — PROGRESS NOTES
"Chief Complaint  Follow-up and Pain of the Right Shoulder and Suture / Staple Removal    Subjective      Kim Owen presents to [facility] for 2-week follow-up of right total reverse shoulder arthroplasty with Dr. Ruano on 7/27/2023.  Patient presents today with a sling in place.  Reports that so far her recovery has been \"pretty bad\".  She is attending physical therapy with [facility] and working on passive range of motion exercises.    Objective   Allergies   Allergen Reactions    Adhesive Tape Rash     BLISTERS/RASH TO SKIN W/TKA 1/2023       Vital Signs:   /61   Pulse 84   Ht 157.5 cm (62\")   Wt 101 kg (223 lb)   SpO2 94%   BMI 40.79 kg/mý     Follow-up with PCP regarding blood pressure.  Physical Exam    Constitutional: Awake, alert. Well nourished appearance.    Integumentary: Warm, dry, intact. No obvious rashes.    HENT: Atraumatic, normocephalic.   Respiratory: Non labored respirations .   Cardiovascular: Intact peripheral pulses.    Psychiatric: Normal mood and affect. A&O X3    Ortho Exam  Right shoulder: Incision is well-approximated and healing appropriately.  There is no redness, drainage or tenderness to the touch.  No edema noted.  She is able to perform pendulum.  Full range of motion of the elbow and the wrist noted.  Sensation is fully intact.  Capillary refill time is brisk.  Able to make a tight fist.  Thumb to finger opposition is intact.    Imaging Results (Most Recent)       Procedure Component Value Units Date/Time    XR Scapula Right [[id number]] Resulted: 08/09/23 1413     Updated: 08/09/23 1413    Narrative:      X-Ray Report:  Study: X-rays ordered, taken in the office, and reviewed today.   Site: Right scapula xray  Indication: Total reverse shoulder arthroplasty  View: AP/Lateral view(s)  Findings: Intact total reverse shoulder arthroplasty. No evidence of   hardware malfunction or periprosthetic fractures.   Prior studies available for comparison: " yes                       Assessment and Plan   Problem List Items Addressed This Visit    None  Visit Diagnoses       Right shoulder pain, unspecified chronicity    -  Primary    Relevant Orders    XR Scapula Right (Completed)    Status post reverse total replacement of right shoulder                Follow Up   Return in about 4 weeks (around 9/6/2023).    Patient is a non-smoker, did not discuss options for smoking cessation.     Social History     Socioeconomic History    Marital status:    Tobacco Use    Smoking status: Never    Smokeless tobacco: Never   Vaping Use    Vaping Use: Never used   Substance and Sexual Activity    Alcohol use: Not Currently    Drug use: Never    Sexual activity: Defer       Patient Instructions   X-rays taken and reviewed with patient showing intact hardware.      Sutures/staples removed in office today. Steri-strips applied. Patient educated on incision care. May shower, but do not submerge in water until fully healed. Do not apply creams or lotions. Allow steri-strips to fall off on their own in 7-10 days.     Continue sling for the next 4 weeks. May remove for hygiene.     Advised on pendulums, gentle range of motion exercises to the elbow, wrist, and hand/fingers. No active range of motion of the shoulder. No lifting, pushing, or pulling.     Order sent for PT/OT to start passive stretching/gentle range of motion only.     Follow-up in 4 weeks. No x-rays needed.   Call with any changes or concerns.    Patient was given instructions and counseling regarding her condition or for health maintenance advice. Please see specific information pulled into the AVS if appropriate.

## 2023-08-24 DIAGNOSIS — F33.1 MAJOR DEPRESSIVE DISORDER, RECURRENT, MODERATE: ICD-10-CM

## 2023-08-24 RX ORDER — BUPROPION HYDROCHLORIDE 100 MG/1
100 TABLET ORAL DAILY
Qty: 90 TABLET | Refills: 1 | Status: SHIPPED | OUTPATIENT
Start: 2023-08-24

## 2023-09-05 ENCOUNTER — TELEPHONE (OUTPATIENT)
Dept: ORTHOPEDIC SURGERY | Facility: CLINIC | Age: 73
End: 2023-09-05
Payer: MEDICARE

## 2023-09-05 DIAGNOSIS — Z96.611 STATUS POST REVERSE TOTAL REPLACEMENT OF RIGHT SHOULDER: ICD-10-CM

## 2023-09-06 RX ORDER — OXYCODONE HYDROCHLORIDE AND ACETAMINOPHEN 5; 325 MG/1; MG/1
1 TABLET ORAL EVERY 4 HOURS PRN
Qty: 42 TABLET | Refills: 0 | Status: SHIPPED | OUTPATIENT
Start: 2023-09-06

## 2023-09-13 ENCOUNTER — OFFICE VISIT (OUTPATIENT)
Dept: ORTHOPEDIC SURGERY | Facility: CLINIC | Age: 73
End: 2023-09-13
Payer: MEDICARE

## 2023-09-13 VITALS
SYSTOLIC BLOOD PRESSURE: 153 MMHG | WEIGHT: 223 LBS | DIASTOLIC BLOOD PRESSURE: 87 MMHG | OXYGEN SATURATION: 96 % | BODY MASS INDEX: 41.04 KG/M2 | HEART RATE: 81 BPM | HEIGHT: 62 IN

## 2023-09-13 DIAGNOSIS — Z96.611 STATUS POST REVERSE TOTAL REPLACEMENT OF RIGHT SHOULDER: Primary | ICD-10-CM

## 2023-09-13 PROCEDURE — 1159F MED LIST DOCD IN RCRD: CPT | Performed by: PHYSICIAN ASSISTANT

## 2023-09-13 PROCEDURE — 3077F SYST BP >= 140 MM HG: CPT | Performed by: PHYSICIAN ASSISTANT

## 2023-09-13 PROCEDURE — 1160F RVW MEDS BY RX/DR IN RCRD: CPT | Performed by: PHYSICIAN ASSISTANT

## 2023-09-13 PROCEDURE — 3079F DIAST BP 80-89 MM HG: CPT | Performed by: PHYSICIAN ASSISTANT

## 2023-09-13 PROCEDURE — 99024 POSTOP FOLLOW-UP VISIT: CPT | Performed by: PHYSICIAN ASSISTANT

## 2023-09-13 NOTE — PROGRESS NOTES
"Chief Complaint  Pain and Follow-up of the Right Shoulder    Subjective      Kim Owen presents to [facility] for follow-up of right total shoulder reverse arthroplasty performed on 7/27/2023 by Dr. Ruano.  Patient presents i today for follow-up without sling in place, reporting she discontinued this following her last office visit.  She has remained in outpatient physical therapy through [facility] and feels this is progressing well.  States that her right shoulder is \"really good\".  States that she has progressed to active shoulder range of motion under the direction of PT.    Objective   Allergies   Allergen Reactions    Adhesive Tape Rash     BLISTERS/RASH TO SKIN W/TKA 1/2023       Vital Signs:   /87   Pulse 81   Ht 157.5 cm (62\")   Wt 101 kg (223 lb)   SpO2 96%   BMI 40.79 kg/m²       Physical Exam    Constitutional: Awake, alert. Well nourished appearance.    Integumentary: Warm, dry, intact. No obvious rashes.    HENT: Atraumatic, normocephalic.   Respiratory: Non labored respirations .   Cardiovascular: Intact peripheral pulses.    Psychiatric: Normal mood and affect. A&O X3    Ortho Exam  Right shoulder: Skin is warm, dry, and intact.  Active shoulder forward flexion to 140 degrees, abduction 110 degrees, and internal rotation to back pocket.  Full flexion and extension of the wrist and elbow.  Full pronation and supination.  Patient is able to form a full fist.  Sensation intact light touch.  Distal neurovascular intact.    Imaging Results (Most Recent)       None                      Assessment and Plan   Problem List Items Addressed This Visit    None  Visit Diagnoses       Status post reverse total replacement of right shoulder    -  Primary            Follow Up   Return in about 6 weeks (around 10/25/2023).    Tobacco Use: Low Risk     Smoking Tobacco Use: Never    Smokeless Tobacco Use: Never    Passive Exposure: Not on file     Patient is a non-smoker.  Did " not discuss tobacco cessation options.    Patient Instructions   Patient has discontinued sling use as of her last appointment. She reports she has already started AROM with PT. No lifting, pushing, or pulling. Nothing heavier than 1 lb in affected arm, until directed by PT.     Continue icing shoulder up to 3-4 times daily for no longer than 15 to 20 minutes at a time as needed for pain or swelling.    Follow-up in 6 weeks.  Call with changes or concerns.  Repeat x-rays needed. Call with changes or concerns.       Patient was given instructions and counseling regarding her condition or for health maintenance advice. Please see specific information pulled into the AVS if appropriate.

## 2023-09-13 NOTE — PATIENT INSTRUCTIONS
Patient has discontinued sling use as of her last appointment. She reports she has already started AROM with PT. No lifting, pushing, or pulling. Nothing heavier than 1 lb in affected arm, until directed by PT.     Continue icing shoulder up to 3-4 times daily for no longer than 15 to 20 minutes at a time as needed for pain or swelling.    Follow-up in 6 weeks.  Call with changes or concerns.  Repeat x-rays needed. Call with changes or concerns.

## 2023-10-16 DIAGNOSIS — E78.2 MIXED HYPERLIPIDEMIA: ICD-10-CM

## 2023-10-16 RX ORDER — ATORVASTATIN CALCIUM 10 MG/1
10 TABLET, FILM COATED ORAL
Qty: 90 TABLET | Refills: 0 | Status: SHIPPED | OUTPATIENT
Start: 2023-10-16

## 2023-10-25 ENCOUNTER — OFFICE VISIT (OUTPATIENT)
Dept: ORTHOPEDIC SURGERY | Facility: CLINIC | Age: 73
End: 2023-10-25
Payer: MEDICARE

## 2023-10-25 VITALS — BODY MASS INDEX: 41.04 KG/M2 | WEIGHT: 223 LBS | HEIGHT: 62 IN

## 2023-10-25 DIAGNOSIS — Z47.1 AFTERCARE FOLLOWING RIGHT SHOULDER JOINT REPLACEMENT SURGERY: Primary | ICD-10-CM

## 2023-10-25 DIAGNOSIS — Z96.611 AFTERCARE FOLLOWING RIGHT SHOULDER JOINT REPLACEMENT SURGERY: Primary | ICD-10-CM

## 2023-10-25 PROCEDURE — 99024 POSTOP FOLLOW-UP VISIT: CPT | Performed by: PHYSICIAN ASSISTANT

## 2023-10-25 PROCEDURE — 1159F MED LIST DOCD IN RCRD: CPT | Performed by: PHYSICIAN ASSISTANT

## 2023-10-25 PROCEDURE — 1160F RVW MEDS BY RX/DR IN RCRD: CPT | Performed by: PHYSICIAN ASSISTANT

## 2023-10-25 NOTE — PATIENT INSTRUCTIONS
X-rays reviewed, showing hardware intact. Patient would benefit from continue participation in PT. Updated PT order placed. Continue home exercise program to progress strength and ROM. Continue icing as needed up to 3-4 times daily for no longer than 15 to 20 minutes at a time.    Continue with lifelong antibiotic prophylaxis with dental procedures following total joint replacement.    Follow-up in 6 weeks. Call sooner, if needed with any changes or concerns.

## 2023-10-25 NOTE — PROGRESS NOTES
"Chief Complaint  Pain and Follow-up of the Right Shoulder    Subjective      Kim Owen presents to [facility] for follow-up of right total shoulder reverse arthroplasty performed by Dr. Ruano on 7/27/2023.  She presents today stating that her shoulder is \"okay, but I still have a little pain with pushing objects\".  She does report continued difficulties with shoulder abduction, however, otherwise denies plaints or pain.  She has remained in outpatient physical therapy through [facility] and feels this is progressing well.    Objective   Allergies   Allergen Reactions    Adhesive Tape Rash     BLISTERS/RASH TO SKIN W/TKA 1/2023       Vital Signs:   Ht 157.5 cm (62\")   Wt 101 kg (223 lb)   BMI 40.79 kg/m²       Physical Exam    Constitutional: Awake, alert. Well nourished appearance.    Integumentary: Warm, dry, intact. No obvious rashes.    HENT: Atraumatic, normocephalic.   Respiratory: Non labored respirations .   Cardiovascular: Intact peripheral pulses.    Psychiatric: Normal mood and affect. A&O X3    Ortho Exam  Right shoulder: Skin is warm, dry, and intact.  Well-healed surgical scar noted.  Active shoulder forward flexion to 150 degrees, abduction to 90 degrees, and external rotation to 65 degrees.  Internal rotation to side pocket.  Full flexion and extension of the wrist and elbow.  Full pronation and supination.  Patient is able to form a full fist.  Good thumb opposition.  Sensation and distal neurovascular intact.    Imaging Results (Most Recent)       Procedure Component Value Units Date/Time    XR Scapula Right [[id number]] Resulted: 10/25/23 1017     Updated: 10/25/23 1017    Narrative:      X-Ray Report:  Study: X-rays ordered, taken in the office, and reviewed today.   Site: Right scapula Xray  Indication: TSRA  View: AP/Lateral view(s)  Findings: Intact right total shoulder reverse arthroplasty.. No evidence   of hardware malfunction.   Prior studies available for " comparison: yes                     Assessment and Plan   Problem List Items Addressed This Visit    None  Visit Diagnoses       Aftercare following right shoulder joint replacement surgery    -  Primary    Relevant Orders    XR Scapula Right (Completed)    Ambulatory Referral to Physical Therapy POST OP (Completed)          Follow Up   Return in about 6 weeks (around 12/6/2023).    Tobacco Use: Low Risk  (10/25/2023)    Patient History     Smoking Tobacco Use: Never     Smokeless Tobacco Use: Never     Passive Exposure: Not on file     Patient is a non-smoker.  Did not discuss tobacco cessation options.    Patient Instructions   X-rays reviewed, showing hardware intact. Patient would benefit from continue participation in PT. Updated PT order placed. Continue home exercise program to progress strength and ROM. Continue icing as needed up to 3-4 times daily for no longer than 15 to 20 minutes at a time.    Continue with lifelong antibiotic prophylaxis with dental procedures following total joint replacement.    Follow-up in 6 weeks. Call sooner, if needed with any changes or concerns.    Patient was given instructions and counseling regarding her condition or for health maintenance advice. Please see specific information pulled into the AVS if appropriate.

## 2023-11-01 ENCOUNTER — TELEPHONE (OUTPATIENT)
Dept: FAMILY MEDICINE CLINIC | Age: 73
End: 2023-11-01

## 2023-11-01 NOTE — TELEPHONE ENCOUNTER
Please clarify. Typically if the dentist wants her to have something after they will prescribe. Is she wanting prophylaxis?

## 2023-11-01 NOTE — TELEPHONE ENCOUNTER
Caller: Kim Owen    Relationship: Self    Best call back number: [phone]    What medication are you requesting: ANTIBIOTIC     What are your current symptoms: UPCOMING DENTAL PROCEDURE ON 11.17    How long have you been experiencing symptoms: N/A    Have you had these symptoms before:    [] Yes  [] No    Have you been treated for these symptoms before:   [] Yes  [] No    If a prescription is needed, what is your preferred pharmacy and phone number: [facility] [address] [place] [address] [phone] [facility] [phone] FX     PATIENT STATES SHE IS HAVING A DENTAL PROCEDURE THIS MONTH AROUND THE 17TH AND IS REQUESTING AN ANTIBIOTIC BE CALLED IN FOR HER TO TAKE AFTER THE PROCEDURE.

## 2023-11-02 RX ORDER — AMOXICILLIN 500 MG/1
2000 TABLET, FILM COATED ORAL ONCE
Qty: 4 TABLET | Refills: 0 | Status: SHIPPED | OUTPATIENT
Start: 2023-11-02 | End: 2023-11-02

## 2023-11-02 NOTE — TELEPHONE ENCOUNTER
Pt states this is to take prior to procedure as prophylaxis. She usually takes one time dose of 4 tablets.

## 2023-11-21 DIAGNOSIS — E11.9 TYPE 2 DIABETES MELLITUS WITHOUT COMPLICATION, WITHOUT LONG-TERM CURRENT USE OF INSULIN: ICD-10-CM

## 2023-11-21 RX ORDER — METFORMIN HYDROCHLORIDE 500 MG/1
500 TABLET, EXTENDED RELEASE ORAL 2 TIMES DAILY
Qty: 180 TABLET | Refills: 0 | Status: SHIPPED | OUTPATIENT
Start: 2023-11-21

## 2023-11-28 DIAGNOSIS — I10 ESSENTIAL (PRIMARY) HYPERTENSION: ICD-10-CM

## 2023-11-28 RX ORDER — METOPROLOL SUCCINATE 25 MG/1
25 TABLET, EXTENDED RELEASE ORAL DAILY
Qty: 90 TABLET | Refills: 1 | Status: SHIPPED | OUTPATIENT
Start: 2023-11-28

## 2023-11-28 NOTE — TELEPHONE ENCOUNTER
Caller: Kim Owen    Relationship: Self    Best call back number: [phone]     Requested Prescriptions:   Requested Prescriptions     Pending Prescriptions Disp Refills    metoprolol succinate XL (TOPROL-XL) 25 MG 24 hr tablet 90 tablet 1     Sig: Take 1 tablet by mouth Daily.      Pharmacy where request should be sent: [address] [phone] [facility] [phone] FX     Last office visit with prescribing clinician: 6/14/2023   Last telemedicine visit with prescribing clinician: Visit date not found   Next office visit with prescribing clinician: 12/21/2023     Does the patient have less than a 3 day supply:  [] Yes  [x] No    Elidia Crow Rep   11/28/23 09:13 EST

## 2023-12-06 ENCOUNTER — OFFICE VISIT (OUTPATIENT)
Dept: ORTHOPEDIC SURGERY | Facility: CLINIC | Age: 73
End: 2023-12-06
Payer: MEDICARE

## 2023-12-06 VITALS — WEIGHT: 223 LBS | BODY MASS INDEX: 41.04 KG/M2 | HEIGHT: 62 IN

## 2023-12-06 DIAGNOSIS — Z47.1 AFTERCARE FOLLOWING RIGHT SHOULDER JOINT REPLACEMENT SURGERY: Primary | ICD-10-CM

## 2023-12-06 DIAGNOSIS — Z96.611 AFTERCARE FOLLOWING RIGHT SHOULDER JOINT REPLACEMENT SURGERY: Primary | ICD-10-CM

## 2023-12-06 NOTE — PROGRESS NOTES
"Chief Complaint  Pain and Follow-up of the Right Shoulder    Subjective      Kim Owen presents to [facility] for follow-up of right total shoulder reverse arthroplasty performed on 7/27/2023 by Dr. Ruano.  She presents today for follow-up reporting that her shoulder is \"pretty good\".  Does report that her shoulder is \"stiff\".  She has been discharged from outpatient physical therapy within the last 3 weeks.  Reports she has been able to return to most of her baseline activities/ADLs.    Objective   Allergies   Allergen Reactions    Adhesive Tape Rash     BLISTERS/RASH TO SKIN W/TKA 1/2023       Vital Signs:   Ht 157.5 cm (62\")   Wt 101 kg (223 lb)   BMI 40.79 kg/m²       Physical Exam    Constitutional: Awake, alert. Well nourished appearance.    Integumentary: Warm, dry, intact. No obvious rashes.    HENT: Atraumatic, normocephalic.   Respiratory: Non labored respirations .   Cardiovascular: Intact peripheral pulses.    Psychiatric: Normal mood and affect. A&O X3    Ortho Exam  Right shoulder: Well-healed surgical scar noted.  Full active shoulder forward flexion, abduction to 150 degrees, and internal rotation to back pocket.  Full flexion extension of the wrist and elbow.  Full pronation supination.  Patient perform full fist.  Good thumb opposition.  Sensation and distal neurovascular intact.    Imaging Results (Most Recent)       Procedure Component Value Units Date/Time    XR Scapula Right [[id number]] Resulted: 12/06/23 1030     Updated: 12/06/23 1035    Narrative:      X-Ray Report:  Study: X-rays ordered, taken in the office, and reviewed today.   Site: Right scapula Xray  Indication: TSRA  View: AP/Lateral view(s)  Findings: Intact right total shoulder reverse arthroplasty. No evidence of   hardware malfunction.   Prior studies available for comparison: yes                       Assessment and Plan   Problem List Items Addressed This Visit    None  Visit Diagnoses       " Aftercare following right shoulder joint replacement surgery    -  Primary    Relevant Orders    XR Scapula Right (Completed)            Follow Up   No follow-ups on file.    Tobacco Use: Low Risk  (12/6/2023)    Patient History     Smoking Tobacco Use: Never     Smokeless Tobacco Use: Never     Passive Exposure: Not on file     Patient is a non-smoker.  Did not discuss tobacco cessation options.    Patient Instructions   X-rays reviewed, showing hardware intact. Continue home exercise program to progress strength and ROM. Continue icing as needed up to 3-4 times daily for no longer than 15 to 20 minutes at a time.    Continue with lifelong antibiotic prophylaxis with dental procedures following total joint replacement.    Follow-up in 8 months. Call sooner, if needed with any changes or concerns. Repeat x-rays.     Patient was given instructions and counseling regarding her condition or for health maintenance advice. Please see specific information pulled into the AVS if appropriate.

## 2023-12-06 NOTE — PATIENT INSTRUCTIONS
X-rays reviewed, showing hardware intact. Continue home exercise program to progress strength and ROM. Continue icing as needed up to 3-4 times daily for no longer than 15 to 20 minutes at a time.    Continue with lifelong antibiotic prophylaxis with dental procedures following total joint replacement.    Follow-up in 8 months. Call sooner, if needed with any changes or concerns. Repeat x-rays.

## 2023-12-21 ENCOUNTER — LAB (OUTPATIENT)
Dept: LAB | Facility: HOSPITAL | Age: 73
End: 2023-12-21
Payer: MEDICARE

## 2023-12-21 ENCOUNTER — OFFICE VISIT (OUTPATIENT)
Dept: FAMILY MEDICINE CLINIC | Age: 73
End: 2023-12-21
Payer: MEDICARE

## 2023-12-21 VITALS
WEIGHT: 225.4 LBS | OXYGEN SATURATION: 97 % | DIASTOLIC BLOOD PRESSURE: 81 MMHG | TEMPERATURE: 98.3 F | BODY MASS INDEX: 41.48 KG/M2 | HEIGHT: 62 IN | HEART RATE: 71 BPM | SYSTOLIC BLOOD PRESSURE: 121 MMHG

## 2023-12-21 DIAGNOSIS — E11.9 TYPE 2 DIABETES MELLITUS WITHOUT COMPLICATION, WITHOUT LONG-TERM CURRENT USE OF INSULIN: ICD-10-CM

## 2023-12-21 DIAGNOSIS — E78.2 MIXED HYPERLIPIDEMIA: ICD-10-CM

## 2023-12-21 DIAGNOSIS — I10 ESSENTIAL (PRIMARY) HYPERTENSION: ICD-10-CM

## 2023-12-21 DIAGNOSIS — F33.1 MAJOR DEPRESSIVE DISORDER, RECURRENT, MODERATE: ICD-10-CM

## 2023-12-21 DIAGNOSIS — Z00.00 MEDICARE ANNUAL WELLNESS VISIT, SUBSEQUENT: Primary | ICD-10-CM

## 2023-12-21 DIAGNOSIS — E03.9 HYPOTHYROIDISM, UNSPECIFIED TYPE: ICD-10-CM

## 2023-12-21 LAB
ALBUMIN SERPL-MCNC: 4.3 G/DL (ref 3.5–5.2)
ALBUMIN/GLOB SERPL: 1.3 G/DL
ALP SERPL-CCNC: 73 U/L (ref 39–117)
ALT SERPL W P-5'-P-CCNC: 20 U/L (ref 1–33)
ANION GAP SERPL CALCULATED.3IONS-SCNC: 14 MMOL/L (ref 5–15)
AST SERPL-CCNC: 18 U/L (ref 1–32)
BILIRUB SERPL-MCNC: 0.5 MG/DL (ref 0–1.2)
BUN SERPL-MCNC: 20 MG/DL (ref 8–23)
BUN/CREAT SERPL: 20 (ref 7–25)
CALCIUM SPEC-SCNC: 10.1 MG/DL (ref 8.6–10.5)
CHLORIDE SERPL-SCNC: 97 MMOL/L (ref 98–107)
CO2 SERPL-SCNC: 27 MMOL/L (ref 22–29)
CREAT SERPL-MCNC: 1 MG/DL (ref 0.57–1)
EGFRCR SERPLBLD CKD-EPI 2021: 60 ML/MIN/1.73
GLOBULIN UR ELPH-MCNC: 3.2 GM/DL
GLUCOSE SERPL-MCNC: 149 MG/DL (ref 65–99)
HBA1C MFR BLD: 6.6 % (ref 4.8–5.6)
POTASSIUM SERPL-SCNC: 3.7 MMOL/L (ref 3.5–5.2)
PROT SERPL-MCNC: 7.5 G/DL (ref 6–8.5)
SODIUM SERPL-SCNC: 138 MMOL/L (ref 136–145)
TSH SERPL DL<=0.05 MIU/L-ACNC: 2.44 UIU/ML (ref 0.27–4.2)

## 2023-12-21 PROCEDURE — 80053 COMPREHEN METABOLIC PANEL: CPT

## 2023-12-21 PROCEDURE — 36415 COLL VENOUS BLD VENIPUNCTURE: CPT

## 2023-12-21 PROCEDURE — 83036 HEMOGLOBIN GLYCOSYLATED A1C: CPT

## 2023-12-21 PROCEDURE — 84443 ASSAY THYROID STIM HORMONE: CPT

## 2023-12-21 RX ORDER — METOPROLOL SUCCINATE 25 MG/1
25 TABLET, EXTENDED RELEASE ORAL DAILY
Qty: 90 TABLET | Refills: 1 | Status: SHIPPED | OUTPATIENT
Start: 2023-12-21

## 2023-12-21 RX ORDER — BUPROPION HYDROCHLORIDE 100 MG/1
100 TABLET ORAL 2 TIMES DAILY
Qty: 180 TABLET | Refills: 1 | Status: SHIPPED | OUTPATIENT
Start: 2023-12-21

## 2023-12-21 RX ORDER — ATORVASTATIN CALCIUM 10 MG/1
10 TABLET, FILM COATED ORAL
Qty: 90 TABLET | Refills: 1 | Status: SHIPPED | OUTPATIENT
Start: 2023-12-21

## 2023-12-21 RX ORDER — METFORMIN HYDROCHLORIDE 500 MG/1
500 TABLET, EXTENDED RELEASE ORAL 2 TIMES DAILY
Qty: 180 TABLET | Refills: 1 | Status: SHIPPED | OUTPATIENT
Start: 2023-12-21

## 2023-12-21 RX ORDER — LEVOTHYROXINE SODIUM 0.03 MG/1
25 TABLET ORAL DAILY
Qty: 90 TABLET | Refills: 1 | Status: SHIPPED | OUTPATIENT
Start: 2023-12-21

## 2023-12-21 NOTE — PROGRESS NOTES
"The ABCs of the Annual Wellness Visit  Subsequent Medicare Wellness Visit    Subjective    Kim Owen is a 73 y.o. female who presents for a Subsequent Medicare Wellness Visit.    The following portions of the patient's history were reviewed and   updated as appropriate: {history reviewed:20406::\"allergies\",\"current medications\",\"past family history\",\"past medical history\",\"past social history\",\"past surgical history\",\"problem list\"}.    Compared to one year ago, the patient feels her physical   health is {better worse same:03786}.    Compared to one year ago, the patient feels her mental   health is {better worse same:03934}.    Recent Hospitalizations:  {Hospital Admission Status in the last 365 days:17037}      Current Medical Providers:  Patient Care Team:  Lindy Chavarria APRN as PCP - General (Nurse Practitioner)  Larry Akhtar MD as Consulting Physician (Neurosurgery)    Outpatient Medications Prior to Visit   Medication Sig Dispense Refill    atorvastatin (LIPITOR) 10 MG tablet TAKE 1 TABLET BY MOUTH EVERY DAY AT BEDTIME 90 tablet 0    buPROPion (WELLBUTRIN) 100 MG tablet Take 1 tablet by mouth Daily. 90 tablet 1    chlorthalidone (HYGROTON) 25 MG tablet Take 1 tablet by mouth Daily. 90 tablet 1    cyclobenzaprine (FLEXERIL) 5 MG tablet Take 1 tablet by mouth As Needed.      levothyroxine (SYNTHROID, LEVOTHROID) 25 MCG tablet Take 1 tablet by mouth Daily. 90 tablet 1    losartan (COZAAR) 100 MG tablet Take 1 tablet by mouth Daily. (Patient taking differently: Take 1 tablet by mouth Daily. INST PER ANESTHESIA PROTOCOL) 90 tablet 1    metFORMIN ER (GLUCOPHAGE-XR) 500 MG 24 hr tablet Take 1 tablet by mouth twice daily 180 tablet 0    metoprolol succinate XL (TOPROL-XL) 25 MG 24 hr tablet Take 1 tablet by mouth Daily. 90 tablet 1    sennosides-docusate (PERICOLACE) 8.6-50 MG per tablet Take 2 tablets by mouth 2 (Two) Times a Day As Needed for Constipation. 20 tablet 0    aspirin 325 MG EC tablet Take 1 " tablet by mouth Daily. (Patient not taking: Reported on 9/13/2023) 30 tablet 0    oxyCODONE-acetaminophen (PERCOCET) 5-325 MG per tablet Take 1 tablet by mouth Every 4 (Four) Hours As Needed for Moderate Pain. (Patient not taking: Reported on 10/25/2023) 42 tablet 0    traMADol (ULTRAM) 50 MG tablet 1 tablet every 6 hours by oral route. (Patient not taking: Reported on 10/25/2023)       No facility-administered medications prior to visit.       Opioid medication/s are on active medication list.  and I have evaluated her active treatment plan and pain score trends (see table).  There were no vitals filed for this visit.  I have reviewed the chart for potential of high risk medication and harmful drug interactions in the elderly.          Aspirin is on active medication list. {ASPIRIN ON MEDICATION LIST INDICATED/NOT INDICATED:48727}.      Patient Active Problem List   Diagnosis    Essential (primary) hypertension    Hyperlipidemia, unspecified    Hypothyroidism, unspecified    Other specified disorders of bone density and structure, unspecified site    Vitamin D deficiency, unspecified    Major depressive disorder, recurrent, moderate    Primary generalized (osteo)arthritis    Spinal stenosis, lumbar region without neurogenic claudication    Chronic pain    Low back pain    Other long term (current) drug therapy    Spondylosis without myelopathy    Renal insufficiency    Degeneration of lumbar intervertebral disc    Anxiety    S/P lumbar fusion    Type 2 diabetes mellitus without complication, without long-term current use of insulin    Osteoarthrosis, localized, primary, knee, left    Rotator cuff arthropathy of right shoulder    Tear of right rotator cuff     Advance Care Planning   Advance Care Planning     Advance Directive is on file.  {ACP Discussion, Advance Directive in EMR:22785}     Objective    Vitals:    12/21/23 0918   BP: 121/81   BP Location: Left arm   Patient Position: Sitting   Cuff Size: Large  "Adult   Pulse: 71   Temp: 98.3 °F (36.8 °C)   TempSrc: Oral   SpO2: 97%   Weight: 102 kg (225 lb 6.4 oz)   Height: 157.5 cm (62\")     Estimated body mass index is 41.23 kg/m² as calculated from the following:    Height as of this encounter: 157.5 cm (62\").    Weight as of this encounter: 102 kg (225 lb 6.4 oz).           Does the patient have evidence of cognitive impairment? {Yes/No:79330}          HEALTH RISK ASSESSMENT    Smoking Status:  Social History     Tobacco Use   Smoking Status Never    Passive exposure: Past   Smokeless Tobacco Never     Alcohol Consumption:  Social History     Substance and Sexual Activity   Alcohol Use Not Currently     Fall Risk Screen:    ELIZABETH Fall Risk Assessment was completed, and patient is at HIGH risk for falls. Assessment completed on:2023    Depression Screenin/21/2023     9:19 AM   PHQ-2/PHQ-9 Depression Screening   Little Interest or Pleasure in Doing Things 3-->nearly every day   Feeling Down, Depressed or Hopeless 1-->several days   Trouble Falling or Staying Asleep, or Sleeping Too Much 0-->not at all   Feeling Tired or Having Little Energy 3-->nearly every day   Poor Appetite or Overeating 3-->nearly every day   Feeling Bad about Yourself - or that You are a Failure or Have Let Yourself or Your Family Down 3-->nearly every day   Trouble Concentrating on Things, Such as Reading the Newspaper or Watching Television 3-->nearly every day   Moving or Speaking So Slowly that Other People Could Have Noticed? Or the Opposite - Being So Fidgety 0-->not at all   Thoughts that You Would be Better Off Dead or of Hurting Yourself in Some Way 0-->not at all   PHQ-9: Brief Depression Severity Measure Score 16   If You Checked Off Any Problems, How Difficult Have These Problems Made It For You to Do Your Work, Take Care of Things at Home, or Get Along with Other People? not difficult at all       Health Habits and Functional and Cognitive Screenin/21/2023     " 9:21 AM   Functional & Cognitive Status   Do you have difficulty preparing food and eating? No   Do you have difficulty bathing yourself, getting dressed or grooming yourself? No   Do you have difficulty using the toilet? No   Do you have difficulty moving around from place to place? No   Do you have trouble with steps or getting out of a bed or a chair? Yes   Current Diet Well Balanced Diet   Dental Exam Up to date   Eye Exam Not up to date   Exercise (times per week) 2 times per week   Current Exercises Include Walking   Do you need help using the phone?  No   Are you deaf or do you have serious difficulty hearing?  No   Do you need help to go to places out of walking distance? Yes   Do you need help shopping? No   Do you need help preparing meals?  No   Do you need help with housework?  No   Do you need help with laundry? No   Do you need help taking your medications? No   Do you need help managing money? No   Do you ever drive or ride in a car without wearing a seat belt? No   Have you felt unusual stress, anger or loneliness in the last month? Yes   Who do you live with? Spouse   If you need help, do you have trouble finding someone available to you? No   Have you been bothered in the last four weeks by sexual problems? No   Do you have difficulty concentrating, remembering or making decisions? No       Age-appropriate Screening Schedule:  Refer to the list below for future screening recommendations based on patient's age, sex and/or medical conditions. Orders for these recommended tests are listed in the plan section. The patient has been provided with a written plan.    Health Maintenance   Topic Date Due    TDAP/TD VACCINES (1 - Tdap) Never done    DIABETIC FOOT EXAM  Never done    DIABETIC EYE EXAM  12/02/2021    COVID-19 Vaccine (6 - 2023-24 season) 09/01/2023    ZOSTER VACCINE (2 of 2) 11/21/2023    MAMMOGRAM  02/06/2024    DXA SCAN  03/24/2024    LIPID PANEL  06/14/2024    BMI FOLLOWUP  10/25/2024     "COLORECTAL CANCER SCREENING  12/20/2024    ANNUAL WELLNESS VISIT  12/21/2024    HEPATITIS C SCREENING  Completed    INFLUENZA VACCINE  Completed    Pneumococcal Vaccine 65+  Completed    HEMOGLOBIN A1C  Discontinued    URINE MICROALBUMIN  Discontinued                  CMS Preventative Services Quick Reference  Risk Factors Identified During Encounter  {Medicare Wellness Risk Factors:83166}  The above risks/problems have been discussed with the patient.  Pertinent information has been shared with the patient in the After Visit Summary.  An After Visit Summary and PPPS were made available to the patient.    Follow Up:   Next Medicare Wellness visit to be scheduled in 1 year.   {Wrapup  Review (Popup)  Advance Care Planning  Labs  CC  Problem List  Visit Diagnosis  Medications  Result Review  Imaging  [facility] Maintenance  Quality  BestPractice  SmartSets  SnapShot  Encounters  Notes  Media  Procedures :23}    Additional E&M Note during same encounter follows:  Patient has multiple medical problems which are significant and separately identifiable that require additional work above and beyond the Medicare Wellness Visit.      Chief Complaint  Medicare Wellness-subsequent    Subjective    {Problem List  Visit Diagnosis   Encounters  Notes  Medications  Labs  Result Review Imaging  Media :23}    JUAN CARLOS Owen is also being seen today for ***         Objective   Vital Signs:  /81 (BP Location: Left arm, Patient Position: Sitting, Cuff Size: Large Adult)   Pulse 71   Temp 98.3 °F (36.8 °C) (Oral)   Ht 157.5 cm (62\")   Wt 102 kg (225 lb 6.4 oz)   SpO2 97%   BMI 41.23 kg/m²     Physical Exam     {The following data was reviewed by (Optional):73288}  {Ambulatory Labs (Optional):39076}  {Data reviewed (Optional):69495:::1}           Assessment and Plan {CC Problem List  Visit Diagnosis   ROS  Review (Popup)  Health Maintenance  Quality  BestPractice  Medications  SmartSets  " SnapShot Encounters  Media :23}  There are no diagnoses linked to this encounter.       {Time Spent (Optional):52595}  Follow Up {Instructions Charge Capture  Follow-up Communications :23}  No follow-ups on file.  Patient was given instructions and counseling regarding her condition or for health maintenance advice. Please see specific information pulled into the AVS if appropriate.

## 2023-12-21 NOTE — PROGRESS NOTES
The ABCs of the Annual Wellness Visit  Subsequent Medicare Wellness Visit    Subjective    Kim Owen is a 73 y.o. female who presents for a Subsequent Medicare Wellness Visit.    The following portions of the patient's history were reviewed and   updated as appropriate: allergies, current medications, past family history, past medical history, past social history, past surgical history, and problem list.    Compared to one year ago, the patient feels her physical   health is better.    Compared to one year ago, the patient feels her mental   health is the same.    Recent Hospitalizations:  She was admitted within the past 365 days at [facility].       Current Medical Providers:  Patient Care Team:  Lindy Chavarria APRN as PCP - General (Nurse Practitioner)  Larry Akhtar MD as Consulting Physician (Neurosurgery)  Lexa Ruano MD as Surgeon (Orthopedic Surgery)    Outpatient Medications Prior to Visit   Medication Sig Dispense Refill    chlorthalidone (HYGROTON) 25 MG tablet Take 1 tablet by mouth Daily. 90 tablet 1    cyclobenzaprine (FLEXERIL) 5 MG tablet Take 1 tablet by mouth As Needed.      losartan (COZAAR) 100 MG tablet Take 1 tablet by mouth Daily. (Patient taking differently: Take 1 tablet by mouth Daily. INST PER ANESTHESIA PROTOCOL) 90 tablet 1    sennosides-docusate (PERICOLACE) 8.6-50 MG per tablet Take 2 tablets by mouth 2 (Two) Times a Day As Needed for Constipation. 20 tablet 0    atorvastatin (LIPITOR) 10 MG tablet TAKE 1 TABLET BY MOUTH EVERY DAY AT BEDTIME 90 tablet 0    buPROPion (WELLBUTRIN) 100 MG tablet Take 1 tablet by mouth Daily. 90 tablet 1    levothyroxine (SYNTHROID, LEVOTHROID) 25 MCG tablet Take 1 tablet by mouth Daily. 90 tablet 1    metFORMIN ER (GLUCOPHAGE-XR) 500 MG 24 hr tablet Take 1 tablet by mouth twice daily 180 tablet 0    metoprolol succinate XL (TOPROL-XL) 25 MG 24 hr tablet Take 1 tablet by mouth Daily. 90 tablet 1    aspirin 325 MG EC tablet Take  1 tablet by mouth Daily. (Patient not taking: Reported on 9/13/2023) 30 tablet 0    oxyCODONE-acetaminophen (PERCOCET) 5-325 MG per tablet Take 1 tablet by mouth Every 4 (Four) Hours As Needed for Moderate Pain. (Patient not taking: Reported on 10/25/2023) 42 tablet 0    traMADol (ULTRAM) 50 MG tablet 1 tablet every 6 hours by oral route. (Patient not taking: Reported on 10/25/2023)       No facility-administered medications prior to visit.       No opioid medication identified on active medication list. I have reviewed chart for other potential  high risk medication/s and harmful drug interactions in the elderly.        Aspirin is on active medication list. Aspirin use is not indicated based on review of current medical condition/s. Risk of harm outweighs potential benefits. Patient instructed to discontinue this medication.  .      Patient Active Problem List   Diagnosis    Essential (primary) hypertension    Hyperlipidemia, unspecified    Hypothyroidism, unspecified    Other specified disorders of bone density and structure, unspecified site    Vitamin D deficiency, unspecified    Major depressive disorder, recurrent, moderate    Primary generalized (osteo)arthritis    Spinal stenosis, lumbar region without neurogenic claudication    Chronic pain    Low back pain    Other long term (current) drug therapy    Spondylosis without myelopathy    Renal insufficiency    Degeneration of lumbar intervertebral disc    Anxiety    S/P lumbar fusion    Type 2 diabetes mellitus without complication, without long-term current use of insulin    Osteoarthrosis, localized, primary, knee, left    Rotator cuff arthropathy of right shoulder    Tear of right rotator cuff     Advance Care Planning   Advance Care Planning     Advance Directive is on file.  ACP discussion was held with the patient during this visit. Patient has an advance directive in EMR which is still valid.      Objective    Vitals:    12/21/23 0918   BP: 121/81   BP  "Location: Left arm   Patient Position: Sitting   Cuff Size: Large Adult   Pulse: 71   Temp: 98.3 °F (36.8 °C)   TempSrc: Oral   SpO2: 97%   Weight: 102 kg (225 lb 6.4 oz)   Height: 157.5 cm (62\")     Estimated body mass index is 41.23 kg/m² as calculated from the following:    Height as of this encounter: 157.5 cm (62\").    Weight as of this encounter: 102 kg (225 lb 6.4 oz).           Does the patient have evidence of cognitive impairment? No          HEALTH RISK ASSESSMENT    Smoking Status:  Social History     Tobacco Use   Smoking Status Never    Passive exposure: Past   Smokeless Tobacco Never     Alcohol Consumption:  Social History     Substance and Sexual Activity   Alcohol Use Not Currently     Fall Risk Screen:    STEADI Fall Risk Assessment was completed, and patient is at HIGH risk for falls. Assessment completed on:2023    Depression Screenin/21/2023     9:19 AM   PHQ-2/PHQ-9 Depression Screening   Little Interest or Pleasure in Doing Things 3-->nearly every day   Feeling Down, Depressed or Hopeless 1-->several days   Trouble Falling or Staying Asleep, or Sleeping Too Much 0-->not at all   Feeling Tired or Having Little Energy 3-->nearly every day   Poor Appetite or Overeating 3-->nearly every day   Feeling Bad about Yourself - or that You are a Failure or Have Let Yourself or Your Family Down 3-->nearly every day   Trouble Concentrating on Things, Such as Reading the Newspaper or Watching Television 3-->nearly every day   Moving or Speaking So Slowly that Other People Could Have Noticed? Or the Opposite - Being So Fidgety 0-->not at all   Thoughts that You Would be Better Off Dead or of Hurting Yourself in Some Way 0-->not at all   PHQ-9: Brief Depression Severity Measure Score 16   If You Checked Off Any Problems, How Difficult Have These Problems Made It For You to Do Your Work, Take Care of Things at Home, or Get Along with Other People? not difficult at all       Health Habits and " Functional and Cognitive Screenin/21/2023     9:21 AM   Functional & Cognitive Status   Do you have difficulty preparing food and eating? No   Do you have difficulty bathing yourself, getting dressed or grooming yourself? No   Do you have difficulty using the toilet? No   Do you have difficulty moving around from place to place? No   Do you have trouble with steps or getting out of a bed or a chair? Yes   Current Diet Well Balanced Diet   Dental Exam Up to date   Eye Exam Not up to date   Exercise (times per week) 2 times per week   Current Exercises Include Walking   Do you need help using the phone?  No   Are you deaf or do you have serious difficulty hearing?  No   Do you need help to go to places out of walking distance? Yes   Do you need help shopping? No   Do you need help preparing meals?  No   Do you need help with housework?  No   Do you need help with laundry? No   Do you need help taking your medications? No   Do you need help managing money? No   Do you ever drive or ride in a car without wearing a seat belt? No   Have you felt unusual stress, anger or loneliness in the last month? Yes   Who do you live with? Spouse   If you need help, do you have trouble finding someone available to you? No   Have you been bothered in the last four weeks by sexual problems? No   Do you have difficulty concentrating, remembering or making decisions? No       Age-appropriate Screening Schedule:  Refer to the list below for future screening recommendations based on patient's age, sex and/or medical conditions. Orders for these recommended tests are listed in the plan section. The patient has been provided with a written plan.    Health Maintenance   Topic Date Due    DIABETIC FOOT EXAM  Never done    DIABETIC EYE EXAM  2021    ZOSTER VACCINE (2 of 2) 2023 (Originally 2023)    COVID-19 Vaccine (6 - -24 season) 2023 (Originally 2023)    TDAP/TD VACCINES (1 - Tdap) 2024  (Originally 7/23/1969)    MAMMOGRAM  02/06/2024    DXA SCAN  03/24/2024    LIPID PANEL  06/14/2024    BMI FOLLOWUP  10/25/2024    COLORECTAL CANCER SCREENING  12/20/2024    ANNUAL WELLNESS VISIT  12/21/2024    HEPATITIS C SCREENING  Completed    INFLUENZA VACCINE  Completed    Pneumococcal Vaccine 65+  Completed    HEMOGLOBIN A1C  Discontinued    URINE MICROALBUMIN  Discontinued                  CMS Preventative Services Quick Reference  Risk Factors Identified During Encounter  Fall Risk-High or Moderate: Information on Fall Prevention Shared in After Visit Summary  Immunizations Discussed/Encouraged: Tdap, Shingrix, COVID19, and RSV (Respiratory Syncytial Virus)  Vision Screening Recommended  The above risks/problems have been discussed with the patient.  Pertinent information has been shared with the patient in the After Visit Summary.  An After Visit Summary and PPPS were made available to the patient.    Follow Up:   Next Medicare Wellness visit to be scheduled in 1 year.       Additional E&M Note during same encounter follows:  Patient has multiple medical problems which are significant and separately identifiable that require additional work above and beyond the Medicare Wellness Visit.      Chief Complaint  Medicare Wellness-subsequent    Subjective        HPI  Kim Owen is here today for follow up on HTN. She is on losartan, chlorthalidone, Toprol XL.  On atorvastatin for hyperlipidemia.   Taking Metformin for diabetes. Last A1C 6.4. Previously declined dietician/diabetes educator. Due for eye exam.  On levothyroxine for hypothyroidism.  Osteopenia on bone density scan. Was previously taking calcium but was taken off by previous provider. She reports getting plenty of calcium in diet. Vitamin D3 54782 daily. Also takes MVI.   Taking Wellbutrin for depression. Feeling down.        Objective   Vital Signs:  /81 (BP Location: Left arm, Patient Position: Sitting, Cuff Size: Large Adult)   Pulse 71   " Temp 98.3 °F (36.8 °C) (Oral)   Ht 157.5 cm (62\")   Wt 102 kg (225 lb 6.4 oz)   SpO2 97%   BMI 41.23 kg/m²     Physical Exam  Vitals reviewed.   Constitutional:       General: She is not in acute distress.     Appearance: Normal appearance. She is well-developed.   HENT:      Head: Normocephalic and atraumatic.   Cardiovascular:      Rate and Rhythm: Normal rate and regular rhythm.      Pulses:           Dorsalis pedis pulses are 2+ on the right side and 2+ on the left side.   Pulmonary:      Effort: Pulmonary effort is normal.      Breath sounds: Normal breath sounds.   Feet:      Right foot:      Protective Sensation: 3 sites tested.  3 sites sensed.      Skin integrity: Skin integrity normal. No ulcer or blister.      Toenail Condition: Right toenails are normal.      Left foot:      Protective Sensation: 3 sites tested.  3 sites sensed.      Skin integrity: Skin integrity normal. No ulcer or blister.      Toenail Condition: Left toenails are normal.      Comments: Diabetic Foot Exam Performed and Monofilament Test Performed     Neurological:      Mental Status: She is alert and oriented to person, place, and time.   Psychiatric:         Mood and Affect: Mood and affect normal.                         Assessment and Plan   Diagnoses and all orders for this visit:    1. Medicare annual wellness visit, subsequent (Primary)  Comments:  Appropriate screenings and vaccinations were reviewed with the pt and offered as indicated.    2. Major depressive disorder, recurrent, moderate  Comments:  Will increase Wellbutrin to BID.  Orders:  -     buPROPion (WELLBUTRIN) 100 MG tablet; Take 1 tablet by mouth 2 (Two) Times a Day.  Dispense: 180 tablet; Refill: 1    3. Type 2 diabetes mellitus without complication, without long-term current use of insulin  Comments:  Medical condition is stable.  Continue same therapy.  Will recheck at next regular appointment  Orders:  -     metFORMIN ER (GLUCOPHAGE-XR) 500 MG 24 hr " tablet; Take 1 tablet by mouth 2 (Two) Times a Day.  Dispense: 180 tablet; Refill: 1  -     Comprehensive metabolic panel; Future  -     Hemoglobin A1c; Future    4. Essential (primary) hypertension  Comments:  Medical condition is stable.  Continue same therapy.  Will recheck at next regular appointment.  Orders:  -     metoprolol succinate XL (TOPROL-XL) 25 MG 24 hr tablet; Take 1 tablet by mouth Daily.  Dispense: 90 tablet; Refill: 1    5. Hypothyroidism, unspecified type  Comments:  Medical condition is stable.  Continue same therapy.  Will recheck at next regular appointment.  Orders:  -     levothyroxine (SYNTHROID, LEVOTHROID) 25 MCG tablet; Take 1 tablet by mouth Daily.  Dispense: 90 tablet; Refill: 1  -     TSH; Future    6. Mixed hyperlipidemia  Comments:  Medical condition is stable.  Continue same therapy.  Will recheck at next regular appointment.  Orders:  -     atorvastatin (LIPITOR) 10 MG tablet; Take 1 tablet by mouth every night at bedtime.  Dispense: 90 tablet; Refill: 1               Follow Up   Return in about 3 months (around 3/21/2024) for Recheck.  Patient was given instructions and counseling regarding her condition or for health maintenance advice. Please see specific information pulled into the AVS if appropriate.

## 2024-01-15 ENCOUNTER — TRANSCRIBE ORDERS (OUTPATIENT)
Dept: ADMINISTRATIVE | Facility: HOSPITAL | Age: 74
End: 2024-01-15
Payer: MEDICARE

## 2024-01-15 DIAGNOSIS — Z12.31 ENCOUNTER FOR SCREENING MAMMOGRAM FOR BREAST CANCER: Primary | ICD-10-CM

## 2024-02-02 ENCOUNTER — OFFICE VISIT (OUTPATIENT)
Dept: FAMILY MEDICINE CLINIC | Age: 74
End: 2024-02-02
Payer: MEDICARE

## 2024-02-02 VITALS
TEMPERATURE: 98.1 F | WEIGHT: 226 LBS | SYSTOLIC BLOOD PRESSURE: 146 MMHG | HEART RATE: 73 BPM | DIASTOLIC BLOOD PRESSURE: 87 MMHG | BODY MASS INDEX: 41.59 KG/M2 | OXYGEN SATURATION: 95 % | HEIGHT: 62 IN

## 2024-02-02 DIAGNOSIS — N39.0 URINARY TRACT INFECTION WITHOUT HEMATURIA, SITE UNSPECIFIED: Primary | ICD-10-CM

## 2024-02-02 DIAGNOSIS — I10 ESSENTIAL (PRIMARY) HYPERTENSION: ICD-10-CM

## 2024-02-02 DIAGNOSIS — H83.91 INNER EAR DYSFUNCTION, RIGHT: ICD-10-CM

## 2024-02-02 LAB
BILIRUB BLD-MCNC: NEGATIVE MG/DL
CLARITY, POC: ABNORMAL
COLOR UR: YELLOW
EXPIRATION DATE: ABNORMAL
GLUCOSE UR STRIP-MCNC: NEGATIVE MG/DL
KETONES UR QL: NEGATIVE
LEUKOCYTE EST, POC: ABNORMAL
Lab: ABNORMAL
NITRITE UR-MCNC: POSITIVE MG/ML
PH UR: 7 [PH] (ref 5–8)
PROT UR STRIP-MCNC: NEGATIVE MG/DL
RBC # UR STRIP: NEGATIVE /UL
SP GR UR: 1.01 (ref 1–1.03)
UROBILINOGEN UR QL: ABNORMAL

## 2024-02-02 PROCEDURE — 1159F MED LIST DOCD IN RCRD: CPT | Performed by: NURSE PRACTITIONER

## 2024-02-02 PROCEDURE — 3079F DIAST BP 80-89 MM HG: CPT | Performed by: NURSE PRACTITIONER

## 2024-02-02 PROCEDURE — 87086 URINE CULTURE/COLONY COUNT: CPT | Performed by: NURSE PRACTITIONER

## 2024-02-02 PROCEDURE — 99214 OFFICE O/P EST MOD 30 MIN: CPT | Performed by: NURSE PRACTITIONER

## 2024-02-02 PROCEDURE — 87077 CULTURE AEROBIC IDENTIFY: CPT | Performed by: NURSE PRACTITIONER

## 2024-02-02 PROCEDURE — 1160F RVW MEDS BY RX/DR IN RCRD: CPT | Performed by: NURSE PRACTITIONER

## 2024-02-02 PROCEDURE — 3077F SYST BP >= 140 MM HG: CPT | Performed by: NURSE PRACTITIONER

## 2024-02-02 PROCEDURE — 81003 URINALYSIS AUTO W/O SCOPE: CPT | Performed by: NURSE PRACTITIONER

## 2024-02-02 PROCEDURE — 87186 SC STD MICRODIL/AGAR DIL: CPT | Performed by: NURSE PRACTITIONER

## 2024-02-02 RX ORDER — NITROFURANTOIN 25; 75 MG/1; MG/1
100 CAPSULE ORAL 2 TIMES DAILY
Qty: 14 CAPSULE | Refills: 0 | Status: SHIPPED | OUTPATIENT
Start: 2024-02-02 | End: 2024-02-09

## 2024-02-02 NOTE — PROGRESS NOTES
Chief Complaint  Kim Owen presents to [facility] for Difficulty Urinating (Pressure and burning w/ urination x 1 month), Earache (Right ear), and Dizziness (Happened for a couple weeks  x 1 week ago)    Subjective          History of Present Illness    Kim is here today with c/o urinary symptoms including pressure and dysuria. First noticed one month ago. Has been taking Uristat for symptoms which helped some but symptoms have been worsening recently.   Also c/o right ear ache. Some congestion as well. She has been taking Tylenol cold and sinus. Reports that she has previously not been able to tolerate nasal sprays. Had some dizziness one week ago but that has improved.     Review of Systems      Allergies   Allergen Reactions    Adhesive Tape Rash     BLISTERS/RASH TO SKIN W/TKA 1/2023      Past Medical History:   Diagnosis Date    Anesthesia     STATES CAN BE SLOW TO WAKE UP AT TIMES    Chronic pain     BACK, NO LONGER SEES PM CLINIC    Diabetes     DOES NOT CHECK BS DAILY    Essential (primary) hypertension     Hyperlipidemia, unspecified     Hypothyroidism, unspecified     Low back pain     Major depressive disorder, recurrent, moderate     Primary generalized (osteo)arthritis     L KNEE    Michael [place] spotted fever     DX 2014 NO CURRENT S/S    Spinal stenosis, lumbar region without neurogenic claudication     Torn rotator cuff     RIGHT    Vitamin D deficiency, unspecified     Zoster without complications      Current Outpatient Medications   Medication Sig Dispense Refill    atorvastatin (LIPITOR) 10 MG tablet Take 1 tablet by mouth every night at bedtime. 90 tablet 1    buPROPion (WELLBUTRIN) 100 MG tablet Take 1 tablet by mouth 2 (Two) Times a Day. 180 tablet 1    chlorthalidone (HYGROTON) 25 MG tablet Take 1 tablet by mouth Daily. 90 tablet 1    levothyroxine (SYNTHROID, LEVOTHROID) 25 MCG tablet Take 1 tablet by mouth Daily. 90 tablet 1    losartan (COZAAR)  100 MG tablet Take 1 tablet by mouth Daily. (Patient taking differently: Take 1 tablet by mouth Daily. INST PER ANESTHESIA PROTOCOL) 90 tablet 1    metFORMIN ER (GLUCOPHAGE-XR) 500 MG 24 hr tablet Take 1 tablet by mouth 2 (Two) Times a Day. 180 tablet 1    metoprolol succinate XL (TOPROL-XL) 25 MG 24 hr tablet Take 1 tablet by mouth Daily. 90 tablet 1    sennosides-docusate (PERICOLACE) 8.6-50 MG per tablet Take 2 tablets by mouth 2 (Two) Times a Day As Needed for Constipation. 20 tablet 0    nitrofurantoin, macrocrystal-monohydrate, (Macrobid) 100 MG capsule Take 1 capsule by mouth 2 (Two) Times a Day for 7 days. 14 capsule 0     No current facility-administered medications for this visit.     Past Surgical History:   Procedure Laterality Date    COLONOSCOPY  2012    HYSTERECTOMY      AGE 48  ENDOMETRIOSIS, MENORRHAGIA    JOINT REPLACEMENT Right 2016    KNEE    SPINE SURGERY      L5-S1    DR. DEAN HONEYCUTT  5/21    TOTAL KNEE ARTHROPLASTY Left 1/10/2023    Procedure: LEFT TOTAL KNEE ARTHROPLASTY WITH JANET ROBOT;  Surgeon: Lexa Ruano MD;  Location: [facility];  Service: Orthopedics;  Laterality: Left;    TOTAL SHOULDER ARTHROPLASTY W/ DISTAL CLAVICLE EXCISION Right 7/27/2023    Procedure: TOTAL SHOULDER REVERSE ARTHROPLASTY;  Surgeon: Lexa Ruano MD;  Location: [facility];  Service: Orthopedics;  Laterality: Right;      Social History     Tobacco Use    Smoking status: Never     Passive exposure: Past    Smokeless tobacco: Never   Vaping Use    Vaping Use: Never used   Substance Use Topics    Alcohol use: Not Currently    Drug use: Never     Family History   Problem Relation Age of Onset    Diabetes type II Father     Alcohol abuse Father     Malig Hyperthermia Neg Hx      Health Maintenance Due   Topic Date Due    DIABETIC EYE EXAM  12/02/2021      Immunization History   Administered Date(s) Administered    COVID-19 (PFIZER) BIVALENT 12+YRS 12/14/2022    COVID-19 (PFIZER) Purple Cap Monovalent  "03/15/2021, 04/05/2021, 12/02/2021    Covid-19 (Pfizer) Gray Cap Monovalent 07/19/2022    Fluzone High-Dose 65+yrs 10/18/2021, 10/31/2022, 09/26/2023    Influenza, Unspecified 12/02/2020    Pneumococcal Conjugate 20-Valent (PCV20) 07/19/2022    Pneumococcal Polysaccharide (PPSV23) 12/02/2020    Shingrix 09/26/2023        Objective     Vitals:    02/02/24 0819 02/02/24 0851   BP: 148/74 146/87   BP Location: Right arm Right arm   Patient Position: Sitting Sitting   Cuff Size: Large Adult    Pulse: 74 73   Temp: 98.1 °F (36.7 °C)    TempSrc: Oral    SpO2: 95%    Weight: 103 kg (226 lb)    Height: 157.5 cm (62.01\")      Body mass index is 41.33 kg/m².          Physical Exam  Vitals reviewed.   Constitutional:       General: She is not in acute distress.     Appearance: Normal appearance. She is well-developed.   HENT:      Head: Normocephalic and atraumatic.      Right Ear: Ear canal normal. A middle ear effusion is present.      Left Ear: Tympanic membrane and ear canal normal.      Nose: Congestion present.      Mouth/Throat:      Mouth: Mucous membranes are moist.   Eyes:      Extraocular Movements: Extraocular movements intact.      Pupils: Pupils are equal, round, and reactive to light.   Cardiovascular:      Rate and Rhythm: Normal rate and regular rhythm.   Pulmonary:      Effort: Pulmonary effort is normal.      Breath sounds: Normal breath sounds.   Neurological:      Mental Status: She is alert and oriented to person, place, and time.   Psychiatric:         Mood and Affect: Mood and affect normal.           Result Review :     The following data was reviewed by: MINGO Mccrary on 02/02/2024:          POCT urinalysis dipstick, automated (02/02/2024 08:36)                 Assessment and Plan      Diagnoses and all orders for this visit:    1. Urinary tract infection without hematuria, site unspecified (Primary)  Comments:  Will treat for UTI with antibiotics. Increase water intake. Await urine " culture.  Orders:  -     POCT urinalysis dipstick, automated  -     Urine Culture - Urine, Urine, Clean Catch; Future  -     Urine Culture - Urine, Urine, Clean Catch  -     nitrofurantoin, macrocrystal-monohydrate, (Macrobid) 100 MG capsule; Take 1 capsule by mouth 2 (Two) Times a Day for 7 days.  Dispense: 14 capsule; Refill: 0    2. Inner ear dysfunction, right  Comments:  Nasal saline. May continue Tylenol cold and sinus for short term.    3. Essential (primary) hypertension  Comments:  BP elevated today. Likely due to OTC cold med. Continue current BP meds and will continue to monitor.              Follow Up     Return for As needed for persistent or worsening symptoms, Next scheduled follow up.

## 2024-02-04 LAB — BACTERIA SPEC AEROBE CULT: ABNORMAL

## 2024-02-07 ENCOUNTER — HOSPITAL ENCOUNTER (OUTPATIENT)
Dept: MAMMOGRAPHY | Facility: HOSPITAL | Age: 74
Discharge: HOME | End: 2024-02-07
Admitting: NURSE PRACTITIONER
Payer: MEDICARE

## 2024-02-07 DIAGNOSIS — Z12.31 ENCOUNTER FOR SCREENING MAMMOGRAM FOR BREAST CANCER: ICD-10-CM

## 2024-02-07 PROCEDURE — 77067 SCR MAMMO BI INCL CAD: CPT

## 2024-02-07 PROCEDURE — 77063 BREAST TOMOSYNTHESIS BI: CPT

## 2024-02-26 ENCOUNTER — TELEPHONE (OUTPATIENT)
Dept: FAMILY MEDICINE CLINIC | Age: 74
End: 2024-02-26
Payer: MEDICARE

## 2024-02-26 NOTE — TELEPHONE ENCOUNTER
1611:  Verbal shift change report given to Jake Ferrer RN (oncoming nurse) by Loli Gale RN (offgoing nurse). Report included the following information SBAR, Kardex, OR Summary, Intake/Output, MAR and Recent Results. Caller: Brayden Kim    Relationship: Self    Best call back number: [phone]    What medication are you requesting: MEDICATION FOR VERTIGO    What are your current symptoms: LOSS OF BALANCE AT TIMES, OCCASIONAL DIZZINESS     How long have you been experiencing symptoms: FOR A FEW WEEKS     Have you had these symptoms before:    [] Yes  [x] No    Have you been treated for these symptoms before:   [] Yes  [x] No    If a prescription is needed, what is your preferred pharmacy and phone number: [facility] [address] [place], [place] [address] [address] [facility] [phone] [facility] [phone]      Additional notes: PATIENT WAS SEEN 02/07/2024.  PLEASE CONTACT AND ADVISE IF SHE NEEDS TO BE SEEN AGAIN OR THERE IS NO MEDICATION TO TREAT HER SYMPTOMS

## 2024-02-28 DIAGNOSIS — H83.91 INNER EAR DYSFUNCTION, RIGHT: Primary | ICD-10-CM

## 2024-02-28 RX ORDER — FLUTICASONE PROPIONATE 50 MCG
2 SPRAY, SUSPENSION (ML) NASAL DAILY
Qty: 48 G | Refills: 1 | Status: SHIPPED | OUTPATIENT
Start: 2024-02-28

## 2024-02-28 RX ORDER — MECLIZINE HYDROCHLORIDE 25 MG/1
25 TABLET ORAL 3 TIMES DAILY PRN
Qty: 30 TABLET | Refills: 0 | Status: SHIPPED | OUTPATIENT
Start: 2024-02-28

## 2024-03-21 ENCOUNTER — TELEPHONE (OUTPATIENT)
Dept: FAMILY MEDICINE CLINIC | Age: 74
End: 2024-03-21
Payer: MEDICARE

## 2024-03-22 ENCOUNTER — OFFICE VISIT (OUTPATIENT)
Dept: FAMILY MEDICINE CLINIC | Age: 74
End: 2024-03-22
Payer: MEDICARE

## 2024-03-22 VITALS
OXYGEN SATURATION: 94 % | HEART RATE: 67 BPM | BODY MASS INDEX: 41.41 KG/M2 | DIASTOLIC BLOOD PRESSURE: 83 MMHG | HEIGHT: 62 IN | SYSTOLIC BLOOD PRESSURE: 143 MMHG | WEIGHT: 225 LBS

## 2024-03-22 DIAGNOSIS — E78.2 MIXED HYPERLIPIDEMIA: ICD-10-CM

## 2024-03-22 DIAGNOSIS — F33.1 MAJOR DEPRESSIVE DISORDER, RECURRENT, MODERATE: ICD-10-CM

## 2024-03-22 DIAGNOSIS — I10 ESSENTIAL (PRIMARY) HYPERTENSION: Primary | ICD-10-CM

## 2024-03-22 DIAGNOSIS — R42 VERTIGO: ICD-10-CM

## 2024-03-22 DIAGNOSIS — E11.9 TYPE 2 DIABETES MELLITUS WITHOUT COMPLICATION, WITHOUT LONG-TERM CURRENT USE OF INSULIN: ICD-10-CM

## 2024-03-22 DIAGNOSIS — E03.9 HYPOTHYROIDISM, UNSPECIFIED TYPE: ICD-10-CM

## 2024-03-22 DIAGNOSIS — M85.80 OTHER SPECIFIED DISORDERS OF BONE DENSITY AND STRUCTURE, UNSPECIFIED SITE: ICD-10-CM

## 2024-03-22 RX ORDER — ESCITALOPRAM OXALATE 5 MG/1
5 TABLET ORAL DAILY
Qty: 90 TABLET | Refills: 0 | Status: SHIPPED | OUTPATIENT
Start: 2024-03-22

## 2024-03-22 RX ORDER — METOPROLOL SUCCINATE 50 MG/1
50 TABLET, EXTENDED RELEASE ORAL DAILY
Qty: 90 TABLET | Refills: 0 | Status: SHIPPED | OUTPATIENT
Start: 2024-03-22

## 2024-03-22 NOTE — ASSESSMENT & PLAN NOTE
Medical condition is stable.  Continue same therapy.  Will recheck at next regular appointment

## 2024-03-22 NOTE — PROGRESS NOTES
Chief Complaint  Kim Owen presents to [facility] for Hypertension (3 month f/u. )    Subjective          History of Present Illness    Kim is here today for follow up on HTN. She is on losartan, chlorthalidone, Toprol XL. BP was elevated at acute visit 2/2/24.   She notes some dizziness. Continued from last visit.   On atorvastatin for hyperlipidemia. Tolerating well.   Taking Metformin for diabetes. Last A1C 6.6. Previously declined dietician/diabetes educator. Reports UTD eye exam with [facility] in [place].   On levothyroxine for hypothyroidism. Last TSH normal.   Osteopenia on bone density scan. Was previously taking calcium but was taken off by previous provider. She reports getting plenty of calcium in diet. Vitamin D3 87152 daily. Also takes MVI.   Taking Wellbutrin for depression. Increased to BID at visit on 12/21/23. She does not note any change in her mood since adjusting dose. She previously took zoloft but did not find effective.     Review of Systems      Allergies   Allergen Reactions    Adhesive Tape Rash     BLISTERS/RASH TO SKIN W/TKA 1/2023      Past Medical History:   Diagnosis Date    Anesthesia     STATES CAN BE SLOW TO WAKE UP AT TIMES    Chronic pain     BACK, NO LONGER SEES PM CLINIC    Diabetes     DOES NOT CHECK BS DAILY    Essential (primary) hypertension     Hyperlipidemia, unspecified     Hypothyroidism, unspecified     Low back pain     Major depressive disorder, recurrent, moderate     Primary generalized (osteo)arthritis     L KNEE    Michael Mountain spotted fever     DX 2014 NO CURRENT S/S    Spinal stenosis, lumbar region without neurogenic claudication     Torn rotator cuff     RIGHT    Vitamin D deficiency, unspecified     Zoster without complications      Current Outpatient Medications   Medication Sig Dispense Refill    atorvastatin (LIPITOR) 10 MG tablet Take 1 tablet by mouth every night at bedtime. 90 tablet 1    buPROPion  (WELLBUTRIN) 100 MG tablet Take 1 tablet by mouth 2 (Two) Times a Day. 180 tablet 1    chlorthalidone (HYGROTON) 25 MG tablet Take 1 tablet by mouth Daily. 90 tablet 1    fluticasone (FLONASE) 50 MCG/ACT nasal spray 2 sprays into the nostril(s) as directed by provider Daily. 48 g 1    levothyroxine (SYNTHROID, LEVOTHROID) 25 MCG tablet Take 1 tablet by mouth Daily. 90 tablet 1    losartan (COZAAR) 100 MG tablet Take 1 tablet by mouth Daily. 90 tablet 1    meclizine (ANTIVERT) 25 MG tablet Take 1 tablet by mouth 3 (Three) Times a Day As Needed for Dizziness. 30 tablet 0    metFORMIN ER (GLUCOPHAGE-XR) 500 MG 24 hr tablet Take 1 tablet by mouth 2 (Two) Times a Day. 180 tablet 1    metoprolol succinate XL (TOPROL-XL) 50 MG 24 hr tablet Take 1 tablet by mouth Daily. 90 tablet 0    sennosides-docusate (PERICOLACE) 8.6-50 MG per tablet Take 2 tablets by mouth 2 (Two) Times a Day As Needed for Constipation. 20 tablet 0    escitalopram (LEXAPRO) 5 MG tablet Take 1 tablet by mouth Daily. 90 tablet 0     No current facility-administered medications for this visit.     Past Surgical History:   Procedure Laterality Date    COLONOSCOPY 2012    HYSTERECTOMY      AGE 48  ENDOMETRIOSIS, MENORRHAGIA    JOINT REPLACEMENT Right 2016    KNEE    SPINE SURGERY      L5-S1    DR. DEAN HONEYCUTT  5/21    TOTAL KNEE ARTHROPLASTY Left 1/10/2023    Procedure: LEFT TOTAL KNEE ARTHROPLASTY WITH JANET ROBOT;  Surgeon: Lexa Ruano MD;  Location: [facility] MAIN OR;  Service: Orthopedics;  Laterality: Left;    TOTAL SHOULDER ARTHROPLASTY W/ DISTAL CLAVICLE EXCISION Right 7/27/2023    Procedure: TOTAL SHOULDER REVERSE ARTHROPLASTY;  Surgeon: Lexa Ruano MD;  Location: [facility] MAIN OR;  Service: Orthopedics;  Laterality: Right;      Social History     Tobacco Use    Smoking status: Never     Passive exposure: Past    Smokeless tobacco: Never   Vaping Use    Vaping status: Never Used   Substance Use Topics    Alcohol use: Not Currently    Drug  "use: Never     Family History   Problem Relation Age of Onset    Diabetes type II Father     Alcohol abuse Father     Maldenzel Hyperthermia Neg Hx      Health Maintenance Due   Topic Date Due    DIABETIC EYE EXAM  12/02/2021    DXA SCAN  03/24/2024      Immunization History   Administered Date(s) Administered    COVID-19 (PFIZER) BIVALENT 12+YRS 12/14/2022    COVID-19 (PFIZER) Purple Cap Monovalent 03/15/2021, 04/05/2021, 12/02/2021    Covid-19 (Pfizer) Gray Cap Monovalent 07/19/2022    Fluzone High-Dose 65+yrs 10/18/2021, 10/31/2022, 09/26/2023    Influenza, Unspecified 12/02/2020    Pneumococcal Conjugate 20-Valent (PCV20) 07/19/2022    Pneumococcal Polysaccharide (PPSV23) 12/02/2020    Shingrix 09/26/2023        Objective     Vitals:    03/22/24 0900   BP: 143/83   BP Location: Left arm   Patient Position: Sitting   Pulse: 67   SpO2: 94%   Weight: 102 kg (225 lb)   Height: 157.5 cm (62.01\")     Body mass index is 41.14 kg/m².             Physical Exam  Vitals reviewed.   Constitutional:       General: She is not in acute distress.     Appearance: Normal appearance. She is well-developed.   HENT:      Head: Normocephalic and atraumatic.      Right Ear: Tympanic membrane and ear canal normal.      Left Ear: Tympanic membrane and ear canal normal.   Cardiovascular:      Rate and Rhythm: Normal rate and regular rhythm.   Pulmonary:      Effort: Pulmonary effort is normal.      Breath sounds: Normal breath sounds.   Neurological:      General: No focal deficit present.      Mental Status: She is alert and oriented to person, place, and time.   Psychiatric:         Mood and Affect: Mood and affect normal.           Result Review :                               Assessment and Plan      Assessment & Plan  Essential (primary) hypertension    BP elevated. Will increase Toprol XL dose to 50 mg daily. BP log. Let me know of concerns.   Vertigo  Discussed physical therapy. Wishes to hold off at this time. Will let me know if she " changes her mind. Make sure drinking plenty of fluids. Change positions slowly.   Mixed hyperlipidemia     Medical condition is stable.  Continue same therapy.  Will recheck at next regular appointment    Type 2 diabetes mellitus without complication, without long-term current use of insulin  Diabetes is stable.   Continue current treatment regimen.  Diabetes will be reassessed in 3 months  Hypothyroidism, unspecified type  Medical condition is stable.  Continue same therapy.  Will recheck at next regular appointment      Other specified disorders of bone density and structure, unspecified site  Declines bone density scan repeat at this time.   Major depressive disorder, recurrent, moderate    Will add Lexapro 5 mg daily. Continue Wellbutrin. Let me know of concerns.      New Medications Ordered This Visit   Medications    escitalopram (LEXAPRO) 5 MG tablet     Sig: Take 1 tablet by mouth Daily.     Dispense:  90 tablet     Refill:  0    metoprolol succinate XL (TOPROL-XL) 50 MG 24 hr tablet     Sig: Take 1 tablet by mouth Daily.     Dispense:  90 tablet     Refill:  0             Follow Up     Return in about 3 months (around 6/22/2024) for Recheck.

## 2024-04-03 ENCOUNTER — TELEPHONE (OUTPATIENT)
Dept: ORTHOPEDIC SURGERY | Facility: CLINIC | Age: 74
End: 2024-04-03
Payer: MEDICARE

## 2024-04-03 NOTE — TELEPHONE ENCOUNTER
LEFT VM TO CONFIRM W/PATIENT THAT SAEID LEAVING PRACTICE 06.27.24 AND THAT THEY WILL BE SEEING KHANH CLEMENT INSTEAD      HUB OK TO RELAY

## 2024-06-21 ENCOUNTER — LAB (OUTPATIENT)
Dept: LAB | Facility: HOSPITAL | Age: 74
End: 2024-06-21
Payer: MEDICARE

## 2024-06-21 ENCOUNTER — OFFICE VISIT (OUTPATIENT)
Dept: FAMILY MEDICINE CLINIC | Age: 74
End: 2024-06-21
Payer: MEDICARE

## 2024-06-21 ENCOUNTER — TELEPHONE (OUTPATIENT)
Dept: FAMILY MEDICINE CLINIC | Age: 74
End: 2024-06-21

## 2024-06-21 VITALS
HEART RATE: 62 BPM | DIASTOLIC BLOOD PRESSURE: 81 MMHG | WEIGHT: 226.6 LBS | TEMPERATURE: 97.6 F | BODY MASS INDEX: 41.7 KG/M2 | SYSTOLIC BLOOD PRESSURE: 147 MMHG | HEIGHT: 62 IN | OXYGEN SATURATION: 95 %

## 2024-06-21 DIAGNOSIS — R53.83 OTHER FATIGUE: ICD-10-CM

## 2024-06-21 DIAGNOSIS — M65.9 TENOSYNOVITIS OF THUMB: ICD-10-CM

## 2024-06-21 DIAGNOSIS — E03.9 HYPOTHYROIDISM, UNSPECIFIED TYPE: ICD-10-CM

## 2024-06-21 DIAGNOSIS — E11.9 TYPE 2 DIABETES MELLITUS WITHOUT COMPLICATION, WITHOUT LONG-TERM CURRENT USE OF INSULIN: ICD-10-CM

## 2024-06-21 DIAGNOSIS — I10 ESSENTIAL (PRIMARY) HYPERTENSION: ICD-10-CM

## 2024-06-21 DIAGNOSIS — E11.9 TYPE 2 DIABETES MELLITUS WITHOUT COMPLICATION, WITHOUT LONG-TERM CURRENT USE OF INSULIN: Primary | ICD-10-CM

## 2024-06-21 DIAGNOSIS — E78.2 MIXED HYPERLIPIDEMIA: ICD-10-CM

## 2024-06-21 DIAGNOSIS — R42 DIZZINESS: ICD-10-CM

## 2024-06-21 DIAGNOSIS — F33.1 MAJOR DEPRESSIVE DISORDER, RECURRENT, MODERATE: ICD-10-CM

## 2024-06-21 LAB
ALBUMIN SERPL-MCNC: 4.2 G/DL (ref 3.5–5.2)
ALBUMIN/GLOB SERPL: 1.5 G/DL
ALP SERPL-CCNC: 64 U/L (ref 39–117)
ALT SERPL W P-5'-P-CCNC: 15 U/L (ref 1–33)
ANION GAP SERPL CALCULATED.3IONS-SCNC: 10.8 MMOL/L (ref 5–15)
AST SERPL-CCNC: 18 U/L (ref 1–32)
BASOPHILS # BLD AUTO: 0.04 10*3/MM3 (ref 0–0.2)
BASOPHILS NFR BLD AUTO: 0.5 % (ref 0–1.5)
BILIRUB SERPL-MCNC: 0.4 MG/DL (ref 0–1.2)
BUN SERPL-MCNC: 18 MG/DL (ref 8–23)
BUN/CREAT SERPL: 17.3 (ref 7–25)
CALCIUM SPEC-SCNC: 9.8 MG/DL (ref 8.6–10.5)
CHLORIDE SERPL-SCNC: 103 MMOL/L (ref 98–107)
CHOLEST SERPL-MCNC: 136 MG/DL (ref 0–200)
CO2 SERPL-SCNC: 24.2 MMOL/L (ref 22–29)
CREAT SERPL-MCNC: 1.04 MG/DL (ref 0.57–1)
DEPRECATED RDW RBC AUTO: 43.3 FL (ref 37–54)
EGFRCR SERPLBLD CKD-EPI 2021: 57.2 ML/MIN/1.73
EOSINOPHIL # BLD AUTO: 0.28 10*3/MM3 (ref 0–0.4)
EOSINOPHIL NFR BLD AUTO: 3.6 % (ref 0.3–6.2)
ERYTHROCYTE [DISTWIDTH] IN BLOOD BY AUTOMATED COUNT: 13 % (ref 12.3–15.4)
GLOBULIN UR ELPH-MCNC: 2.8 GM/DL
GLUCOSE SERPL-MCNC: 113 MG/DL (ref 65–99)
HBA1C MFR BLD: 6.1 % (ref 4.8–5.6)
HCT VFR BLD AUTO: 39.4 % (ref 34–46.6)
HDLC SERPL-MCNC: 40 MG/DL (ref 40–60)
HGB BLD-MCNC: 13 G/DL (ref 12–15.9)
IMM GRANULOCYTES # BLD AUTO: 0.01 10*3/MM3 (ref 0–0.05)
IMM GRANULOCYTES NFR BLD AUTO: 0.1 % (ref 0–0.5)
LDLC SERPL CALC-MCNC: 70 MG/DL (ref 0–100)
LDLC/HDLC SERPL: 1.67 {RATIO}
LYMPHOCYTES # BLD AUTO: 2.56 10*3/MM3 (ref 0.7–3.1)
LYMPHOCYTES NFR BLD AUTO: 33.3 % (ref 19.6–45.3)
MCH RBC QN AUTO: 29.5 PG (ref 26.6–33)
MCHC RBC AUTO-ENTMCNC: 33 G/DL (ref 31.5–35.7)
MCV RBC AUTO: 89.5 FL (ref 79–97)
MONOCYTES # BLD AUTO: 0.75 10*3/MM3 (ref 0.1–0.9)
MONOCYTES NFR BLD AUTO: 9.8 % (ref 5–12)
NEUTROPHILS NFR BLD AUTO: 4.05 10*3/MM3 (ref 1.7–7)
NEUTROPHILS NFR BLD AUTO: 52.7 % (ref 42.7–76)
PLATELET # BLD AUTO: 263 10*3/MM3 (ref 140–450)
PMV BLD AUTO: 10.1 FL (ref 6–12)
POTASSIUM SERPL-SCNC: 4.5 MMOL/L (ref 3.5–5.2)
PROT SERPL-MCNC: 7 G/DL (ref 6–8.5)
RBC # BLD AUTO: 4.4 10*6/MM3 (ref 3.77–5.28)
SODIUM SERPL-SCNC: 138 MMOL/L (ref 136–145)
TRIGL SERPL-MCNC: 147 MG/DL (ref 0–150)
TSH SERPL DL<=0.05 MIU/L-ACNC: 2.29 UIU/ML (ref 0.27–4.2)
VIT B12 BLD-MCNC: 455 PG/ML (ref 211–946)
VLDLC SERPL-MCNC: 26 MG/DL (ref 5–40)
WBC NRBC COR # BLD AUTO: 7.69 10*3/MM3 (ref 3.4–10.8)

## 2024-06-21 PROCEDURE — 80053 COMPREHEN METABOLIC PANEL: CPT

## 2024-06-21 PROCEDURE — 1160F RVW MEDS BY RX/DR IN RCRD: CPT | Performed by: NURSE PRACTITIONER

## 2024-06-21 PROCEDURE — 1159F MED LIST DOCD IN RCRD: CPT | Performed by: NURSE PRACTITIONER

## 2024-06-21 PROCEDURE — 80061 LIPID PANEL: CPT

## 2024-06-21 PROCEDURE — 36415 COLL VENOUS BLD VENIPUNCTURE: CPT

## 2024-06-21 PROCEDURE — 3078F DIAST BP <80 MM HG: CPT | Performed by: NURSE PRACTITIONER

## 2024-06-21 PROCEDURE — 84443 ASSAY THYROID STIM HORMONE: CPT

## 2024-06-21 PROCEDURE — 82607 VITAMIN B-12: CPT

## 2024-06-21 PROCEDURE — 85025 COMPLETE CBC W/AUTO DIFF WBC: CPT

## 2024-06-21 PROCEDURE — 99214 OFFICE O/P EST MOD 30 MIN: CPT | Performed by: NURSE PRACTITIONER

## 2024-06-21 PROCEDURE — 3077F SYST BP >= 140 MM HG: CPT | Performed by: NURSE PRACTITIONER

## 2024-06-21 PROCEDURE — 83036 HEMOGLOBIN GLYCOSYLATED A1C: CPT

## 2024-06-21 RX ORDER — LOSARTAN POTASSIUM 100 MG/1
100 TABLET ORAL DAILY
Qty: 90 TABLET | Refills: 1 | Status: SHIPPED | OUTPATIENT
Start: 2024-06-21

## 2024-06-21 RX ORDER — METOPROLOL SUCCINATE 25 MG/1
25 TABLET, EXTENDED RELEASE ORAL DAILY
Qty: 90 TABLET | Refills: 0 | Status: SHIPPED | OUTPATIENT
Start: 2024-06-21

## 2024-06-21 RX ORDER — LEVOTHYROXINE SODIUM 0.03 MG/1
25 TABLET ORAL DAILY
Qty: 90 TABLET | Refills: 1 | Status: SHIPPED | OUTPATIENT
Start: 2024-06-21

## 2024-06-21 RX ORDER — ATORVASTATIN CALCIUM 10 MG/1
10 TABLET, FILM COATED ORAL
Qty: 90 TABLET | Refills: 1 | Status: SHIPPED | OUTPATIENT
Start: 2024-06-21

## 2024-06-21 RX ORDER — BUPROPION HYDROCHLORIDE 100 MG/1
100 TABLET ORAL 2 TIMES DAILY
Qty: 180 TABLET | Refills: 1 | Status: SHIPPED | OUTPATIENT
Start: 2024-06-21

## 2024-06-21 RX ORDER — AMLODIPINE BESYLATE 2.5 MG/1
2.5 TABLET ORAL DAILY
Qty: 90 TABLET | Refills: 0 | Status: SHIPPED | OUTPATIENT
Start: 2024-06-21

## 2024-06-21 RX ORDER — CHLORTHALIDONE 25 MG/1
25 TABLET ORAL DAILY
Qty: 90 TABLET | Refills: 1 | Status: SHIPPED | OUTPATIENT
Start: 2024-06-21

## 2024-06-21 RX ORDER — METFORMIN HYDROCHLORIDE 500 MG/1
500 TABLET, EXTENDED RELEASE ORAL 2 TIMES DAILY
Qty: 180 TABLET | Refills: 1 | Status: SHIPPED | OUTPATIENT
Start: 2024-06-21

## 2024-06-21 RX ORDER — MULTIPLE VITAMINS W/ MINERALS TAB 9MG-400MCG
1 TAB ORAL DAILY
COMMUNITY

## 2024-06-21 RX ORDER — ESCITALOPRAM OXALATE 10 MG/1
10 TABLET ORAL DAILY
Qty: 90 TABLET | Refills: 0 | Status: SHIPPED | OUTPATIENT
Start: 2024-06-21

## 2024-06-21 NOTE — ASSESSMENT & PLAN NOTE
Checking labs. Consider decreased metformin dose to once daily for diarrhea if A1C ok. Will request eye exam.

## 2024-06-21 NOTE — PROGRESS NOTES
Chief Complaint  Kim Owen presents to [facility] for Hypertension    Subjective          History of Present Illness    Kim is following up on HTN. She is on losartan, chlorthalidone, Toprol XL. Dose of Toprol XL was increased at last visit.   On atorvastatin for hyperlipidemia. Tolerating well.   Taking Metformin for diabetes. Last A1C 6.6. Reports UTD eye exam with [facility] in [place].   On levothyroxine for hypothyroidism. Last TSH normal.   Osteopenia on bone density scan. Was previously taking calcium but was taken off by previous provider. She reports getting plenty of calcium in diet. Vitamin D3 daily. Also takes MVI. Declines repeat bone density scan.   Taking Wellbutrin for depression. Increased to BID at visit on 12/21/23. Lexapro 5 mg daily added at last visit. She previously took zoloft but did not find effective. She does not feel that the Lexapro has been beneficial for her.   C/o discomfort at base of bilateral thumbs. No longer crocheting. Tylenol eases pain some.   Reports that she does not feel well. Not interested in doing things that she previously enjoyed. Feeling tired. Some continued dizziness. Some diarrhea that she attributes to her metformin.     Review of Systems      Allergies   Allergen Reactions    Adhesive Tape Rash     BLISTERS/RASH TO SKIN W/TKA 1/2023      Past Medical History:   Diagnosis Date    Chronic pain     BACK, NO LONGER SEES PM CLINIC    Diabetes     Essential (primary) hypertension     Hyperlipidemia, unspecified     Hypothyroidism, unspecified     Low back pain     Major depressive disorder, recurrent, moderate     Primary generalized (osteo)arthritis     L KNEE    Michael Mountain spotted fever     DX 2014 NO CURRENT S/S    Spinal stenosis, lumbar region without neurogenic claudication     Torn rotator cuff     RIGHT    Vitamin D deficiency, unspecified     Zoster without complications      Current Outpatient Medications    Medication Sig Dispense Refill    atorvastatin (LIPITOR) 10 MG tablet Take 1 tablet by mouth every night at bedtime. 90 tablet 1    buPROPion (WELLBUTRIN) 100 MG tablet Take 1 tablet by mouth 2 (Two) Times a Day. 180 tablet 1    chlorthalidone (HYGROTON) 25 MG tablet Take 1 tablet by mouth Daily. 90 tablet 1    escitalopram (LEXAPRO) 10 MG tablet Take 1 tablet by mouth Daily. 90 tablet 0    levothyroxine (SYNTHROID, LEVOTHROID) 25 MCG tablet Take 1 tablet by mouth Daily. 90 tablet 1    losartan (COZAAR) 100 MG tablet Take 1 tablet by mouth Daily. 90 tablet 1    meclizine (ANTIVERT) 25 MG tablet Take 1 tablet by mouth 3 (Three) Times a Day As Needed for Dizziness. 30 tablet 0    metFORMIN ER (GLUCOPHAGE-XR) 500 MG 24 hr tablet Take 1 tablet by mouth 2 (Two) Times a Day. 180 tablet 1    metoprolol succinate XL (TOPROL-XL) 25 MG 24 hr tablet Take 1 tablet by mouth Daily. 90 tablet 0    sennosides-docusate (PERICOLACE) 8.6-50 MG per tablet Take 2 tablets by mouth 2 (Two) Times a Day As Needed for Constipation. 20 tablet 0    amLODIPine (NORVASC) 2.5 MG tablet Take 1 tablet by mouth Daily. 90 tablet 0    multivitamin with minerals tablet tablet Take 1 tablet by mouth Daily.       No current facility-administered medications for this visit.     Past Surgical History:   Procedure Laterality Date    COLONOSCOPY 2012    HYSTERECTOMY      AGE 48  ENDOMETRIOSIS, MENORRHAGIA    JOINT REPLACEMENT Right 2016    KNEE    SPINE SURGERY      L5-S1    DR. DEAN HONEYCUTT  5/21    TOTAL KNEE ARTHROPLASTY Left 1/10/2023    Procedure: LEFT TOTAL KNEE ARTHROPLASTY WITH JANET ROBOT;  Surgeon: Lexa Ruano MD;  Location: [facility] MAIN OR;  Service: Orthopedics;  Laterality: Left;    TOTAL SHOULDER ARTHROPLASTY W/ DISTAL CLAVICLE EXCISION Right 7/27/2023    Procedure: TOTAL SHOULDER REVERSE ARTHROPLASTY;  Surgeon: Lexa Ruano MD;  Location: [facility] MAIN OR;  Service: Orthopedics;  Laterality: Right;      Social History     Tobacco Use  "   Smoking status: Never     Passive exposure: Past    Smokeless tobacco: Never   Vaping Use    Vaping status: Never Used   Substance Use Topics    Alcohol use: Not Currently    Drug use: Never     Family History   Problem Relation Age of Onset    Diabetes type II Father     Alcohol abuse Father     Josefina Hyperthermia Neg Hx      Health Maintenance Due   Topic Date Due    DIABETIC EYE EXAM  12/02/2021    DXA SCAN  03/24/2024    LIPID PANEL  06/14/2024    COLORECTAL CANCER SCREENING  12/20/2024      Immunization History   Administered Date(s) Administered    COVID-19 (PFIZER) BIVALENT 12+YRS 12/14/2022    COVID-19 (PFIZER) Purple Cap Monovalent 03/15/2021, 04/05/2021, 12/02/2021    Covid-19 (Pfizer) Gray Cap Monovalent 07/19/2022    Fluzone High-Dose 65+yrs 10/18/2021, 10/31/2022, 09/26/2023    Influenza, Unspecified 12/02/2020    Pneumococcal Conjugate 20-Valent (PCV20) 07/19/2022    Pneumococcal Polysaccharide (PPSV23) 12/02/2020    Shingrix 09/26/2023        Objective     Vitals:    06/21/24 0858 06/21/24 0903   BP: 148/75 147/81   BP Location: Left arm Right arm   Patient Position: Sitting Sitting   Cuff Size: Large Adult Large Adult   Pulse: 63 62   Temp: 97.6 °F (36.4 °C)    TempSrc: Oral    SpO2: 95%    Weight: 103 kg (226 lb 9.6 oz)    Height: 157.5 cm (62.01\")      Body mass index is 41.43 kg/m².                No results found.    Physical Exam  Vitals reviewed.   Constitutional:       General: She is not in acute distress.     Appearance: Normal appearance. She is well-developed.   HENT:      Head: Normocephalic and atraumatic.   Cardiovascular:      Rate and Rhythm: Normal rate and regular rhythm.   Pulmonary:      Effort: Pulmonary effort is normal.      Breath sounds: Normal breath sounds.   Neurological:      Mental Status: She is alert and oriented to person, place, and time.   Psychiatric:         Mood and Affect: Mood and affect normal.           Result Review :                             "   Assessment and Plan      Assessment & Plan  Type 2 diabetes mellitus without complication, without long-term current use of insulin    Checking labs. Consider decreased metformin dose to once daily for diarrhea if A1C ok. Will request eye exam.     Hypothyroidism, unspecified type  Medical condition is stable.  Continue same therapy.  Will recheck at next regular appointment    Mixed hyperlipidemia     Rechecking labs  Major depressive disorder, recurrent, moderate    Increase Lexapro to 10 mg daily. Add Wellbutrin.   Essential (primary) hypertension    Decrease Toprol back to 25 mg daily. Continue chlorthalidone and Losartan. Add amlodipine.   Tenosynovitis of thumb  Thumb spica splint. OTC Voltaren gel. Consider hand specialist referral as needed.   Other fatigue  Checking labs. Also decreased Toprol. Could be due in part to depression.   Dizziness  Checking labs. Consider imaging based on symptom progression and lab results.     Orders Placed This Encounter   Procedures    TSH Rfx On Abnormal To Free T4    Comprehensive metabolic panel    Lipid panel    Hemoglobin A1c    Vitamin B12    CBC w AUTO Differential     New Medications Ordered This Visit   Medications    metFORMIN ER (GLUCOPHAGE-XR) 500 MG 24 hr tablet     Sig: Take 1 tablet by mouth 2 (Two) Times a Day.     Dispense:  180 tablet     Refill:  1    losartan (COZAAR) 100 MG tablet     Sig: Take 1 tablet by mouth Daily.     Dispense:  90 tablet     Refill:  1    levothyroxine (SYNTHROID, LEVOTHROID) 25 MCG tablet     Sig: Take 1 tablet by mouth Daily.     Dispense:  90 tablet     Refill:  1    atorvastatin (LIPITOR) 10 MG tablet     Sig: Take 1 tablet by mouth every night at bedtime.     Dispense:  90 tablet     Refill:  1    buPROPion (WELLBUTRIN) 100 MG tablet     Sig: Take 1 tablet by mouth 2 (Two) Times a Day.     Dispense:  180 tablet     Refill:  1    chlorthalidone (HYGROTON) 25 MG tablet     Sig: Take 1 tablet by mouth Daily.     Dispense:  90  tablet     Refill:  1    escitalopram (LEXAPRO) 10 MG tablet     Sig: Take 1 tablet by mouth Daily.     Dispense:  90 tablet     Refill:  0    metoprolol succinate XL (TOPROL-XL) 25 MG 24 hr tablet     Sig: Take 1 tablet by mouth Daily.     Dispense:  90 tablet     Refill:  0    amLODIPine (NORVASC) 2.5 MG tablet     Sig: Take 1 tablet by mouth Daily.     Dispense:  90 tablet     Refill:  0                 Follow Up     Return in about 8 weeks (around 8/16/2024) for Recheck.

## 2024-06-21 NOTE — TELEPHONE ENCOUNTER
Please request copy of last eye exam from [facility] in [place]

## 2024-08-07 ENCOUNTER — OFFICE VISIT (OUTPATIENT)
Dept: ORTHOPEDIC SURGERY | Facility: CLINIC | Age: 74
End: 2024-08-07
Payer: MEDICARE

## 2024-08-07 VITALS
DIASTOLIC BLOOD PRESSURE: 76 MMHG | WEIGHT: 226 LBS | HEIGHT: 62 IN | SYSTOLIC BLOOD PRESSURE: 117 MMHG | HEART RATE: 76 BPM | OXYGEN SATURATION: 94 % | BODY MASS INDEX: 41.59 KG/M2

## 2024-08-07 DIAGNOSIS — Z96.652 HISTORY OF TOTAL KNEE ARTHROPLASTY, LEFT: ICD-10-CM

## 2024-08-07 DIAGNOSIS — Z47.1 AFTERCARE FOLLOWING RIGHT SHOULDER JOINT REPLACEMENT SURGERY: ICD-10-CM

## 2024-08-07 DIAGNOSIS — M25.511 RIGHT SHOULDER PAIN, UNSPECIFIED CHRONICITY: Primary | ICD-10-CM

## 2024-08-07 DIAGNOSIS — Z96.611 AFTERCARE FOLLOWING RIGHT SHOULDER JOINT REPLACEMENT SURGERY: ICD-10-CM

## 2024-08-07 DIAGNOSIS — M25.562 LEFT KNEE PAIN, UNSPECIFIED CHRONICITY: ICD-10-CM

## 2024-08-07 PROCEDURE — 3074F SYST BP LT 130 MM HG: CPT

## 2024-08-07 PROCEDURE — 1159F MED LIST DOCD IN RCRD: CPT

## 2024-08-07 PROCEDURE — 99214 OFFICE O/P EST MOD 30 MIN: CPT

## 2024-08-07 PROCEDURE — 1160F RVW MEDS BY RX/DR IN RCRD: CPT

## 2024-08-07 PROCEDURE — 3078F DIAST BP <80 MM HG: CPT

## 2024-08-07 NOTE — PROGRESS NOTES
"Chief Complaint  Follow-up of the Right Shoulder and Follow-up of the Left Knee    Subjective      Kim Owen presents to [facility] for follow up of her right shoulder and left knee.  Patient is 1 year status post right total shoulder reverse arthroplasty performed by Dr. Ruano on 7/27/2023.  Patient is status post left total knee arthroplasty performed by Dr. Ruano on 1/10/2023.  Patient states overall she is doing very well.  She states that she has no concerns.  She has full range of motion with her right shoulder.  She states that her left knee is doing better than her right.  She is having some right hip pain/lateral thigh pain but is doing okay.    Allergies   Allergen Reactions    Adhesive Tape Rash     BLISTERS/RASH TO SKIN W/TKA 1/2023       Objective     Vital Signs:   Vitals:    08/07/24 0911   BP: 117/76   Pulse: 76   SpO2: 94%   Weight: 103 kg (226 lb)   Height: 157.5 cm (62.01\")     Body mass index is 41.33 kg/m².    I reviewed the patient's chief complaint, history of present illness, review of systems, past medical history, surgical history, family history, social history, medications, and allergy list.     REVIEW OF SYSTEMS    Constitutional: Denies fevers, chills, weight loss  Cardiovascular: Denies chest pain, shortness of breath  Skin: Denies rashes, acute skin changes  Neurologic: Denies headache, loss of consciousness  MSK: Right shoulder pain, left knee pain.     Ortho Exam  Knee   General: Alert, no acute distress.   Left knee: No pain with passive hip ROM.  Well-healed incision.  Knee stable to varus/valgus stress.  Knee extensor mechanism intact.  0 degrees knee extension. Flexion to 115 degrees. Calf soft, non-tender.  Sensation and neurovascularly intact.  Demonstrates active ankle dorsiflexion and plantarflexion.  Palpable pedal pulses.     General: Alert. No acute distress.  Right upper Extremity: Well-healed incision. 180 degrees active elevation. " External rotation to 65 degrees. Internal rotation to back pocket.  90 degrees pronation and supination. Demonstrates intact active elbow ROM. Demonstrates intact active wrist ROM. Sensation intact. Palpable radial pulse. Neurovascularly intact.                Imaging Results (Most Recent)       Procedure Component Value Units Date/Time    XR Scapula Right [[id number]] Resulted: 08/07/24 0946     Updated: 08/07/24 0946    Narrative:      X-Ray Report:  Study: X-rays ordered, taken in the office, and reviewed today  Site: Right shoulder xray  Indication: Right total shoulder reverse arthroplasty follow-up  View: AP, scapular right shoulder view(s)  Findings: Intact right total shoulder reverse arthroplasty.  No evidence   of hardware complications or loosening.  No evidence of periprosthetic   fractures.  Prior studies available for comparison: yes     XR Knee 3 View Left [[id number]] Resulted: 08/07/24 0946     Updated: 08/07/24 0946    Narrative:      X-Ray Report:  Study: X-rays ordered, taken in the office, and reviewed today.   Site: Left knee Xray  Indication: Left total knee arthroplasty follow up.   View: AP/Lateral, Standing, and Gerty view(s)  Findings: Intact left total knee arthroplasty. No evidence of hardware   malfunction, complication or loosening. No periprosthetic fractures   visualized. Patella is midline tracking on sunrise view.   Prior studies available for comparison: yes                    Assessment and Plan   Diagnoses and all orders for this visit:    1. Right shoulder pain, unspecified chronicity (Primary)  -     XR Scapula Right    2. Left knee pain, unspecified chronicity  -     XR Knee 3 View Left    3. Aftercare following right shoulder joint replacement surgery    4. History of total knee arthroplasty, left         Kim Owen is 1 year post-op right total shoulder reverse arthroplasty performed with Dr. Ruano on 7/27/2023.  Patient is also status post left total knee arthroplasty  performed with Dr. Ruano on 1/10/2023.  X-rays reviewed, showing hardware intact and no complications.  Patient is doing well. Continue home exercise program to progress strength and ROM.     Discussed the importance of lifelong antibiotic prophylaxis with dental procedures following total joint replacement. In the event of a dental procedure, patient is welcome to contact our office to obtain antibiotics prior to the procedure if their dentist does not provide the prescription. Patient verbalized understanding.     Follow-up in 1 year. We will repeat xray's of the prosthetic joint at that time.   Call sooner or return to care, if needed with any changes or concerns.        Tobacco Use: Low Risk  (8/7/2024)    Patient History     Smoking Tobacco Use: Never     Smokeless Tobacco Use: Never     Passive Exposure: Past     Patient reports that they are a nonsmoker; cessation education not applicable.            Follow Up   Return in about 1 year (around 8/7/2025).  There are no Patient Instructions on file for this visit.  Patient was given instructions and counseling regarding her condition or for health maintenance advice. Please see specific information pulled into the AVS if appropriate.       Dictated Utilizing Dragon Dictation. Please note that portions of this note were completed with a voice recognition program. Part of this note may be an electronic transcription/translation of spoken language to printed text using the Dragon Dictation System.

## 2024-08-21 ENCOUNTER — OFFICE VISIT (OUTPATIENT)
Dept: FAMILY MEDICINE CLINIC | Age: 74
End: 2024-08-21
Payer: MEDICARE

## 2024-08-21 ENCOUNTER — TELEPHONE (OUTPATIENT)
Dept: FAMILY MEDICINE CLINIC | Age: 74
End: 2024-08-21

## 2024-08-21 VITALS
TEMPERATURE: 98.5 F | SYSTOLIC BLOOD PRESSURE: 141 MMHG | WEIGHT: 222.6 LBS | HEIGHT: 62 IN | DIASTOLIC BLOOD PRESSURE: 65 MMHG | HEART RATE: 60 BPM | BODY MASS INDEX: 40.96 KG/M2 | OXYGEN SATURATION: 95 %

## 2024-08-21 DIAGNOSIS — F33.1 MAJOR DEPRESSIVE DISORDER, RECURRENT, MODERATE: Primary | ICD-10-CM

## 2024-08-21 DIAGNOSIS — H60.93 OTITIS EXTERNA OF BOTH EARS, UNSPECIFIED CHRONICITY, UNSPECIFIED TYPE: ICD-10-CM

## 2024-08-21 DIAGNOSIS — E11.9 TYPE 2 DIABETES MELLITUS WITHOUT COMPLICATION, WITHOUT LONG-TERM CURRENT USE OF INSULIN: ICD-10-CM

## 2024-08-21 DIAGNOSIS — I10 ESSENTIAL (PRIMARY) HYPERTENSION: ICD-10-CM

## 2024-08-21 DIAGNOSIS — E03.9 HYPOTHYROIDISM, UNSPECIFIED TYPE: ICD-10-CM

## 2024-08-21 DIAGNOSIS — E78.2 MIXED HYPERLIPIDEMIA: ICD-10-CM

## 2024-08-21 PROCEDURE — 1159F MED LIST DOCD IN RCRD: CPT | Performed by: NURSE PRACTITIONER

## 2024-08-21 PROCEDURE — 3044F HG A1C LEVEL LT 7.0%: CPT | Performed by: NURSE PRACTITIONER

## 2024-08-21 PROCEDURE — 99214 OFFICE O/P EST MOD 30 MIN: CPT | Performed by: NURSE PRACTITIONER

## 2024-08-21 PROCEDURE — 1160F RVW MEDS BY RX/DR IN RCRD: CPT | Performed by: NURSE PRACTITIONER

## 2024-08-21 PROCEDURE — 3078F DIAST BP <80 MM HG: CPT | Performed by: NURSE PRACTITIONER

## 2024-08-21 PROCEDURE — G2211 COMPLEX E/M VISIT ADD ON: HCPCS | Performed by: NURSE PRACTITIONER

## 2024-08-21 PROCEDURE — 3077F SYST BP >= 140 MM HG: CPT | Performed by: NURSE PRACTITIONER

## 2024-08-21 RX ORDER — ESCITALOPRAM OXALATE 10 MG/1
10 TABLET ORAL DAILY
Qty: 90 TABLET | Refills: 0 | Status: SHIPPED | OUTPATIENT
Start: 2024-08-21

## 2024-08-21 RX ORDER — AMLODIPINE BESYLATE 2.5 MG/1
2.5 TABLET ORAL DAILY
Qty: 90 TABLET | Refills: 0 | Status: SHIPPED | OUTPATIENT
Start: 2024-08-21

## 2024-08-21 RX ORDER — OFLOXACIN 3 MG/ML
10 SOLUTION AURICULAR (OTIC) DAILY
Qty: 10 ML | Refills: 0 | Status: SHIPPED | OUTPATIENT
Start: 2024-08-21 | End: 2024-08-28

## 2024-08-21 NOTE — PROGRESS NOTES
Chief Complaint  Kim Owen presents to [facility] for Diabetes (2 month follow up )    Subjective          History of Present Illness    Kim is following up on HTN. She is on losartan, chlorthalidone, Toprol XL, and amlodipine. Dose of Toprol XL was decreased at last visit and amlodipine was added. She has not been checking her blood pressure at home. BP was well controlled at her ortho appointment earlier this month.   Reports dizziness improved. Some ear discomfort recently as well.   On atorvastatin for hyperlipidemia. Tolerating well.   Taking Metformin for diabetes. Last A1C 6.1. [facility] Records have been requested but not received.   On levothyroxine for hypothyroidism. Last TSH normal.   Osteopenia on bone density scan. Was previously taking calcium but was taken off by previous provider. She reports getting plenty of calcium in diet. Taking Vitamin D3 daily. Also takes MVI. Declines repeat bone density scan.   Taking Wellbutrin and Lexapro for depression. She feels that this is helping her symptoms. She feels more energy and doing better mentally.     Review of Systems      Allergies   Allergen Reactions    Adhesive Tape Rash     BLISTERS/RASH TO SKIN W/TKA 1/2023      Past Medical History:   Diagnosis Date    Chronic pain     BACK, NO LONGER SEES PM CLINIC    Diabetes     Essential (primary) hypertension     Hyperlipidemia, unspecified     Hypothyroidism, unspecified     Low back pain     Major depressive disorder, recurrent, moderate     Primary generalized (osteo)arthritis     L KNEE    Michael Mountain spotted fever     DX 2014 NO CURRENT S/S    Spinal stenosis, lumbar region without neurogenic claudication     Torn rotator cuff     RIGHT    Vitamin D deficiency, unspecified     Zoster without complications      Current Outpatient Medications   Medication Sig Dispense Refill    amLODIPine (NORVASC) 2.5 MG tablet Take 1  tablet by mouth Daily. 90 tablet 0    atorvastatin (LIPITOR) 10 MG tablet Take 1 tablet by mouth every night at bedtime. 90 tablet 1    buPROPion (WELLBUTRIN) 100 MG tablet Take 1 tablet by mouth 2 (Two) Times a Day. 180 tablet 1    chlorthalidone (HYGROTON) 25 MG tablet Take 1 tablet by mouth Daily. 90 tablet 1    escitalopram (LEXAPRO) 10 MG tablet Take 1 tablet by mouth Daily. 90 tablet 0    levothyroxine (SYNTHROID, LEVOTHROID) 25 MCG tablet Take 1 tablet by mouth Daily. 90 tablet 1    losartan (COZAAR) 100 MG tablet Take 1 tablet by mouth Daily. 90 tablet 1    meclizine (ANTIVERT) 25 MG tablet Take 1 tablet by mouth 3 (Three) Times a Day As Needed for Dizziness. 30 tablet 0    metFORMIN ER (GLUCOPHAGE-XR) 500 MG 24 hr tablet Take 1 tablet by mouth 2 (Two) Times a Day. 180 tablet 1    metoprolol succinate XL (TOPROL-XL) 25 MG 24 hr tablet Take 1 tablet by mouth Daily. 90 tablet 0    multivitamin with minerals tablet tablet Take 1 tablet by mouth Daily.      sennosides-docusate (PERICOLACE) 8.6-50 MG per tablet Take 2 tablets by mouth 2 (Two) Times a Day As Needed for Constipation. 20 tablet 0    ofloxacin (FLOXIN) 0.3 % otic solution Administer 10 drops into ear(s) as directed by provider Daily for 7 days. 10 mL 0     No current facility-administered medications for this visit.     Past Surgical History:   Procedure Laterality Date    COLONOSCOPY  2012    HYSTERECTOMY      AGE 48  ENDOMETRIOSIS, MENORRHAGIA    JOINT REPLACEMENT Right 2016    KNEE    SPINE SURGERY      L5-S1    DR. DEAN HONEYCUTT  5/21    TOTAL KNEE ARTHROPLASTY Left 1/10/2023    Procedure: LEFT TOTAL KNEE ARTHROPLASTY WITH JANET ROBOT;  Surgeon: Lexa Ruano MD;  Location: [facility] MAIN OR;  Service: Orthopedics;  Laterality: Left;    TOTAL SHOULDER ARTHROPLASTY W/ DISTAL CLAVICLE EXCISION Right 7/27/2023    Procedure: TOTAL SHOULDER REVERSE ARTHROPLASTY;  Surgeon: Lexa Ruano MD;  Location: [facility] MAIN OR;  Service: Orthopedics;   "Laterality: Right;      Social History     Tobacco Use    Smoking status: Never     Passive exposure: Past    Smokeless tobacco: Never   Vaping Use    Vaping status: Never Used   Substance Use Topics    Alcohol use: Not Currently    Drug use: Never     Family History   Problem Relation Age of Onset    Diabetes type II Father     Alcohol abuse Father     Josefina Hyperthermia Neg Hx      Health Maintenance Due   Topic Date Due    DIABETIC EYE EXAM  12/02/2021    DXA SCAN  03/24/2024    INFLUENZA VACCINE  08/01/2024    COLORECTAL CANCER SCREENING  12/20/2024      Immunization History   Administered Date(s) Administered    COVID-19 (PFIZER) BIVALENT 12+YRS 12/14/2022    COVID-19 (PFIZER) Purple Cap Monovalent 03/15/2021, 04/05/2021, 12/02/2021    Covid-19 (Pfizer) Gray Cap Monovalent 07/19/2022    Fluzone High-Dose 65+yrs 10/18/2021, 10/31/2022, 09/26/2023    Influenza, Unspecified 12/02/2020    Pneumococcal Conjugate 20-Valent (PCV20) 07/19/2022    Pneumococcal Polysaccharide (PPSV23) 12/02/2020    Shingrix 09/26/2023        Objective     Vitals:    08/21/24 0902 08/21/24 0906 08/21/24 0932   BP: 146/76 144/77 141/65   Pulse: 60 59 60   Temp: 98.5 °F (36.9 °C)     TempSrc: Oral     SpO2: 95%     Weight: 101 kg (222 lb 9.6 oz)     Height: 157.5 cm (62.01\")       Body mass index is 40.7 kg/m².                No results found.    Physical Exam  Vitals reviewed.   Constitutional:       General: She is not in acute distress.     Appearance: Normal appearance. She is well-developed.   HENT:      Head: Normocephalic and atraumatic.      Ears:      Comments: Bilateral EC erythematous with dried drainage  Cardiovascular:      Rate and Rhythm: Normal rate and regular rhythm.   Pulmonary:      Effort: Pulmonary effort is normal.      Breath sounds: Normal breath sounds.   Neurological:      Mental Status: She is alert and oriented to person, place, and time.   Psychiatric:         Mood and Affect: Mood and affect normal. "           Result Review :                               Assessment and Plan      Assessment & Plan  Major depressive disorder, recurrent, moderate    Symptoms improved. Continue same therapy.  Will recheck at next regular appointment    Essential (primary) hypertension     BP improved. Will continue current treatment and plan for recheck at next visit. BP was well controlled at her ortho appointment. Worried that if increase medication doses that she will have adverse effects.   Hypothyroidism, unspecified type  Medical condition is stable.  Continue same therapy.  Will recheck at next regular appointment    Type 2 diabetes mellitus without complication, without long-term current use of insulin    Medical condition is stable.  Continue same therapy.  Will recheck at next regular appointment  Will request eye exam again  Mixed hyperlipidemia     Medical condition is stable.  Continue same therapy.  Will recheck at next regular appointment    Otitis externa of both ears, unspecified chronicity, unspecified type  Will treat with ofloxacin ear drops     New Medications Ordered This Visit   Medications    ofloxacin (FLOXIN) 0.3 % otic solution     Sig: Administer 10 drops into ear(s) as directed by provider Daily for 7 days.     Dispense:  10 mL     Refill:  0    escitalopram (LEXAPRO) 10 MG tablet     Sig: Take 1 tablet by mouth Daily.     Dispense:  90 tablet     Refill:  0    amLODIPine (NORVASC) 2.5 MG tablet     Sig: Take 1 tablet by mouth Daily.     Dispense:  90 tablet     Refill:  0                 Follow Up     Return in about 4 months (around 12/21/2024) for Medicare Wellness.

## 2024-08-21 NOTE — ASSESSMENT & PLAN NOTE
BP improved. Will continue current treatment and plan for recheck at next visit. BP was well controlled at her ortho appointment. Worried that if increase medication doses that she will have adverse effects.

## 2024-08-21 NOTE — ASSESSMENT & PLAN NOTE
Medical condition is stable.  Continue same therapy.  Will recheck at next regular appointment  Will request eye exam again

## 2024-10-28 ENCOUNTER — TELEPHONE (OUTPATIENT)
Dept: FAMILY MEDICINE CLINIC | Age: 74
End: 2024-10-28
Payer: MEDICARE

## 2024-10-28 NOTE — TELEPHONE ENCOUNTER
Pt called the answering service stating that she is having dental work next week and will need an abx

## 2024-10-29 DIAGNOSIS — Z79.2 NEED FOR ANTIBIOTIC PROPHYLAXIS FOR DENTAL PROCEDURE: Primary | ICD-10-CM

## 2024-10-29 RX ORDER — AMOXICILLIN 500 MG/1
2000 CAPSULE ORAL ONCE
Qty: 4 CAPSULE | Refills: 0 | Status: SHIPPED | OUTPATIENT
Start: 2024-10-29 | End: 2024-10-29

## 2024-11-20 ENCOUNTER — TELEPHONE (OUTPATIENT)
Dept: FAMILY MEDICINE CLINIC | Age: 74
End: 2024-11-20
Payer: MEDICARE

## 2024-11-20 DIAGNOSIS — Z79.2 NEED FOR ANTIBIOTIC PROPHYLAXIS FOR DENTAL PROCEDURE: Primary | ICD-10-CM

## 2024-11-20 RX ORDER — AMOXICILLIN 500 MG/1
TABLET, FILM COATED ORAL
Qty: 4 TABLET | Refills: 0 | Status: SHIPPED | OUTPATIENT
Start: 2024-11-20

## 2024-11-20 NOTE — TELEPHONE ENCOUNTER
Caller: Kim Owen    Relationship: Self    Best call back number: [phone]     What medication are you requesting: ANTIBIOTICS     What are your current symptoms: DENTAL PROCEDURE ON 11/25 AND ANTIBIOTIC IS NEEDED     If a prescription is needed, what is your preferred pharmacy and phone number: [facility] [address] [place] [address] - [address] [facility] [phone] [facility] [phone]

## 2024-11-22 ENCOUNTER — OFFICE VISIT (OUTPATIENT)
Dept: FAMILY MEDICINE CLINIC | Age: 74
End: 2024-11-22
Payer: MEDICARE

## 2024-11-22 VITALS
TEMPERATURE: 98.3 F | HEIGHT: 62 IN | BODY MASS INDEX: 40.74 KG/M2 | OXYGEN SATURATION: 99 % | WEIGHT: 221.4 LBS | DIASTOLIC BLOOD PRESSURE: 63 MMHG | SYSTOLIC BLOOD PRESSURE: 127 MMHG | HEART RATE: 74 BPM

## 2024-11-22 DIAGNOSIS — M54.41 RIGHT-SIDED LOW BACK PAIN WITH RIGHT-SIDED SCIATICA, UNSPECIFIED CHRONICITY: ICD-10-CM

## 2024-11-22 DIAGNOSIS — R42 DIZZINESS: Primary | ICD-10-CM

## 2024-11-22 DIAGNOSIS — H93.13 TINNITUS OF BOTH EARS: ICD-10-CM

## 2024-11-22 DIAGNOSIS — Z23 ENCOUNTER FOR IMMUNIZATION: ICD-10-CM

## 2024-11-22 DIAGNOSIS — H69.93 EUSTACHIAN TUBE DYSFUNCTION, BILATERAL: ICD-10-CM

## 2024-11-22 RX ORDER — METHYLPREDNISOLONE 4 MG/1
TABLET ORAL
Qty: 21 TABLET | Refills: 0 | Status: SHIPPED | OUTPATIENT
Start: 2024-11-22

## 2024-11-22 NOTE — PROGRESS NOTES
Chief Complaint  Kim Owen presents to [facility] for Dizziness    Subjective          History of Present Illness    Kim is here today with c/o dizziness. This has been an intermittent problem since January 2024. Dizziness feels like 'inside her head is going around'. Can not identify any exacerbating triggers but does note that it occurs every night when she is lying down for bed. She reports that she has been dizzy approximately 10 times over the last couple of weeks. This typically just lasts for a very short period of times just seconds. Denies LOC, headaches. She has fallen a couple of times as well. She notes some recent ear popping. Some tinnitus. She has tried to do Epley manuevers at home. This made her feel nauseated and worse so she did not complete again. She did note some improvement with meclizine. Has not taken recently. Over the last year, she has been treated with ofloxacin and fluticasone. She has been taking Benadryl nightly over the last month. Denies CP, palpitations.  Additionally, c/o right sided low back pain. Radiating down right leg to knee. Numbness/tingling.     Review of Systems      Allergies   Allergen Reactions    Adhesive Tape Rash     BLISTERS/RASH TO SKIN W/TKA 1/2023      Past Medical History:   Diagnosis Date    Chronic pain     BACK, NO LONGER SEES PM CLINIC    Diabetes     Essential (primary) hypertension     Hyperlipidemia, unspecified     Hypothyroidism, unspecified     Low back pain     Major depressive disorder, recurrent, moderate     Primary generalized (osteo)arthritis     L KNEE    Michael Mountain spotted fever     DX 2014 NO CURRENT S/S    Spinal stenosis, lumbar region without neurogenic claudication     Torn rotator cuff     RIGHT    Vitamin D deficiency, unspecified     Zoster without complications      Current Outpatient Medications   Medication Sig Dispense Refill    amLODIPine (NORVASC) 2.5 MG tablet Take 1 tablet by  mouth Daily. 90 tablet 0    amoxicillin (AMOXIL) 500 MG tablet Take 2000 mg one hour prior to procedure (Patient taking differently: As Needed. Take 2000 mg one hour prior to procedure) 4 tablet 0    atorvastatin (LIPITOR) 10 MG tablet Take 1 tablet by mouth every night at bedtime. 90 tablet 1    buPROPion (WELLBUTRIN) 100 MG tablet Take 1 tablet by mouth 2 (Two) Times a Day. 180 tablet 1    chlorthalidone (HYGROTON) 25 MG tablet Take 1 tablet by mouth Daily. 90 tablet 1    escitalopram (LEXAPRO) 10 MG tablet Take 1 tablet by mouth Daily. 90 tablet 0    levothyroxine (SYNTHROID, LEVOTHROID) 25 MCG tablet Take 1 tablet by mouth Daily. 90 tablet 1    losartan (COZAAR) 100 MG tablet Take 1 tablet by mouth Daily. 90 tablet 1    meclizine (ANTIVERT) 25 MG tablet Take 1 tablet by mouth 3 (Three) Times a Day As Needed for Dizziness. 30 tablet 0    metFORMIN ER (GLUCOPHAGE-XR) 500 MG 24 hr tablet Take 1 tablet by mouth 2 (Two) Times a Day. 180 tablet 1    metoprolol succinate XL (TOPROL-XL) 25 MG 24 hr tablet Take 1 tablet by mouth Daily. 90 tablet 0    multivitamin with minerals tablet tablet Take 1 tablet by mouth Daily.      sennosides-docusate (PERICOLACE) 8.6-50 MG per tablet Take 2 tablets by mouth 2 (Two) Times a Day As Needed for Constipation. 20 tablet 0    methylPREDNISolone (Medrol) 4 MG tablet Take as directed for 5 days 21 tablet 0     No current facility-administered medications for this visit.     Past Surgical History:   Procedure Laterality Date    COLONOSCOPY  2012    HYSTERECTOMY      AGE 48  ENDOMETRIOSIS, MENORRHAGIA    JOINT REPLACEMENT Right 2016    KNEE    SPINE SURGERY      L5-S1    DR. DEAN HONEYCUTT  5/21    TOTAL KNEE ARTHROPLASTY Left 1/10/2023    Procedure: LEFT TOTAL KNEE ARTHROPLASTY WITH JANET ROBOT;  Surgeon: Lexa Ruano MD;  Location: [facility] MAIN OR;  Service: Orthopedics;  Laterality: Left;    TOTAL SHOULDER ARTHROPLASTY W/ DISTAL CLAVICLE EXCISION Right 7/27/2023    Procedure: TOTAL  "SHOULDER REVERSE ARTHROPLASTY;  Surgeon: Lexa Ruano MD;  Location: [facility] OR;  Service: Orthopedics;  Laterality: Right;      Social History     Tobacco Use    Smoking status: Never     Passive exposure: Past    Smokeless tobacco: Never   Vaping Use    Vaping status: Never Used   Substance Use Topics    Alcohol use: Not Currently    Drug use: Never     Family History   Problem Relation Age of Onset    Diabetes type II Father     Alcohol abuse Father     Maldenzel Hyperthermia Neg Hx      Health Maintenance Due   Topic Date Due    DIABETIC EYE EXAM  12/02/2021    DXA SCAN  03/24/2024    BMI FOLLOWUP  10/25/2024    COLORECTAL CANCER SCREENING  12/20/2024    ANNUAL WELLNESS VISIT  12/21/2024    MAMMOGRAM  02/07/2025      Immunization History   Administered Date(s) Administered    COVID-19 (PFIZER) BIVALENT 12+YRS 12/14/2022    COVID-19 (PFIZER) Purple Cap Monovalent 03/15/2021, 04/05/2021, 12/02/2021    Covid-19 (Pfizer) Gray Cap Monovalent 07/19/2022    Fluzone High-Dose 65+YRS 11/22/2024    Fluzone High-Dose 65+yrs 10/18/2021, 10/31/2022, 09/26/2023    Influenza, Unspecified 12/02/2020    Pneumococcal Conjugate 20-Valent (PCV20) 07/19/2022    Pneumococcal Polysaccharide (PPSV23) 12/02/2020    Shingrix 09/26/2023        Objective     Vitals:    11/22/24 1019   BP: 127/63   Pulse: 74   Temp: 98.3 °F (36.8 °C)   TempSrc: Oral   SpO2: 99%   Weight: 100 kg (221 lb 6.4 oz)   Height: 157.5 cm (62.01\")     Body mass index is 40.48 kg/m².     Class 3 Severe Obesity (BMI >=40). Obesity-related health conditions include the following: diabetes mellitus. Obesity is unchanged. BMI is is above average; BMI management plan is completed. We discussed portion control and increasing exercise.            No results found.    Physical Exam  Vitals reviewed.   Constitutional:       General: She is not in acute distress.     Appearance: Normal appearance. She is well-developed.   HENT:      Head: Normocephalic and atraumatic.      " Right Ear: Ear canal normal. A middle ear effusion is present.      Left Ear: Ear canal normal. A middle ear effusion is present.      Mouth/Throat:      Mouth: Mucous membranes are moist.   Eyes:      Extraocular Movements: Extraocular movements intact.      Pupils: Pupils are equal, round, and reactive to light.   Cardiovascular:      Rate and Rhythm: Normal rate and regular rhythm.   Pulmonary:      Effort: Pulmonary effort is normal.      Breath sounds: Normal breath sounds.   Neurological:      General: No focal deficit present.      Mental Status: She is alert and oriented to person, place, and time.   Psychiatric:         Mood and Affect: Mood and affect normal.           Result Review :                               Assessment and Plan      Assessment & Plan  Dizziness  She has had persistent, intermittent dizziness over the last 9-10 months. This has been worsening recently. Also with tinnitus so will get her set up with ENT for further evaluation. Will treat with course of oral steroids for the eustachian tube dysfunction which will also hopefully help with her low back symptoms. She can continue benadryl and fluticasone.   Orders:    Ambulatory Referral to ENT (Otolaryngology)    Tinnitus of both ears    Orders:    Ambulatory Referral to ENT (Otolaryngology)    Eustachian tube dysfunction, bilateral    Orders:    methylPREDNISolone (Medrol) 4 MG tablet; Take as directed for 5 days    Encounter for immunization  Updating influenza vaccine today.   Orders:    Fluzone High-Dose 65+yrs (8648-9451)    Right-sided low back pain with right-sided sciatica, unspecified chronicity                   Follow Up     Return for Next scheduled follow up, Or sooner as needed.

## 2024-12-18 ENCOUNTER — OFFICE VISIT (OUTPATIENT)
Dept: FAMILY MEDICINE CLINIC | Age: 74
End: 2024-12-18
Payer: MEDICARE

## 2024-12-18 VITALS
BODY MASS INDEX: 40.12 KG/M2 | WEIGHT: 218 LBS | DIASTOLIC BLOOD PRESSURE: 60 MMHG | HEART RATE: 74 BPM | TEMPERATURE: 97.7 F | OXYGEN SATURATION: 97 % | HEIGHT: 62 IN | SYSTOLIC BLOOD PRESSURE: 119 MMHG

## 2024-12-18 DIAGNOSIS — R82.998 LEUKOCYTES IN URINE: Primary | ICD-10-CM

## 2024-12-18 DIAGNOSIS — R30.0 DYSURIA: ICD-10-CM

## 2024-12-18 LAB
BILIRUB BLD-MCNC: NEGATIVE MG/DL
CLARITY, POC: CLEAR
COLOR UR: YELLOW
EXPIRATION DATE: ABNORMAL
GLUCOSE UR STRIP-MCNC: NEGATIVE MG/DL
KETONES UR QL: NEGATIVE
LEUKOCYTE EST, POC: ABNORMAL
Lab: ABNORMAL
NITRITE UR-MCNC: POSITIVE MG/ML
PH UR: 7 [PH] (ref 5–8)
PROT UR STRIP-MCNC: NEGATIVE MG/DL
RBC # UR STRIP: NEGATIVE /UL
SP GR UR: 1.02 (ref 1–1.03)
UROBILINOGEN UR QL: NORMAL

## 2024-12-18 PROCEDURE — 3078F DIAST BP <80 MM HG: CPT | Performed by: NURSE PRACTITIONER

## 2024-12-18 PROCEDURE — 3044F HG A1C LEVEL LT 7.0%: CPT | Performed by: NURSE PRACTITIONER

## 2024-12-18 PROCEDURE — 87086 URINE CULTURE/COLONY COUNT: CPT | Performed by: NURSE PRACTITIONER

## 2024-12-18 PROCEDURE — 81003 URINALYSIS AUTO W/O SCOPE: CPT | Performed by: NURSE PRACTITIONER

## 2024-12-18 PROCEDURE — 1160F RVW MEDS BY RX/DR IN RCRD: CPT | Performed by: NURSE PRACTITIONER

## 2024-12-18 PROCEDURE — 99213 OFFICE O/P EST LOW 20 MIN: CPT | Performed by: NURSE PRACTITIONER

## 2024-12-18 PROCEDURE — 1159F MED LIST DOCD IN RCRD: CPT | Performed by: NURSE PRACTITIONER

## 2024-12-18 PROCEDURE — 3074F SYST BP LT 130 MM HG: CPT | Performed by: NURSE PRACTITIONER

## 2024-12-18 RX ORDER — NITROFURANTOIN 25; 75 MG/1; MG/1
100 CAPSULE ORAL 2 TIMES DAILY
Qty: 10 CAPSULE | Refills: 0 | Status: SHIPPED | OUTPATIENT
Start: 2024-12-18

## 2024-12-18 NOTE — ASSESSMENT & PLAN NOTE
Uristat may cause false positive nitrites on urinalysis.  Sending urine sample for urine culture.  Patient reports symptoms are moderate to severe.  Will electively start on Macrobid and await urine culture results.  Suggested patient repeating a urine a couple weeks after completion of antibiotics to ensure resolution.

## 2024-12-18 NOTE — PROGRESS NOTES
"Chief Complaint  Vaginal Pain (Patient thinks this is a UTI for 1 week )    Subjective          Kim Owen presents to [facility]     Patient is a 74-year-old female who is here today regarding pain only when she urinates x 5 days.  Her last treatment for urinary tract infection was in February with Macrobid.  Urine culture grew Klebsiella.  She is not certain if symptoms never completely resolved.  She has been taking Uristat over-the-counter.  She denies incontinence, fever, nausea, vomiting, vaginal discharge, or any other related symptoms.  She drinks 1 diet soda per day otherwise water and milk.     Objective   Vital Signs:   Vitals:    12/18/24 0951   BP: 119/60   BP Location: Left arm   Patient Position: Sitting   Cuff Size: Large Adult   Pulse: 74   Temp: 97.7 °F (36.5 °C)   TempSrc: Temporal   SpO2: 97%   Weight: 98.9 kg (218 lb)   Height: 157.5 cm (62\")       Wt Readings from Last 3 Encounters:   12/18/24 98.9 kg (218 lb)   11/22/24 100 kg (221 lb 6.4 oz)   08/21/24 101 kg (222 lb 9.6 oz)      BP Readings from Last 3 Encounters:   12/18/24 119/60   11/22/24 127/63   08/21/24 141/65       Body mass index is 39.87 kg/m².             Physical Exam  Vitals reviewed.   Constitutional:       General: She is not in acute distress.     Appearance: Normal appearance. She is well-developed. She is obese.   Cardiovascular:      Rate and Rhythm: Normal rate and regular rhythm.      Heart sounds: Normal heart sounds.   Pulmonary:      Effort: Pulmonary effort is normal.      Breath sounds: Normal breath sounds.   Musculoskeletal:      Right lower leg: No edema.      Left lower leg: No edema.   Skin:     General: Skin is warm and dry.   Neurological:      General: No focal deficit present.      Mental Status: She is alert.   Psychiatric:         Attention and Perception: Attention normal.         Mood and Affect: Mood and affect normal.         Behavior: Behavior normal.         "   Current Outpatient Medications:     amLODIPine (NORVASC) 2.5 MG tablet, Take 1 tablet by mouth Daily., Disp: 90 tablet, Rfl: 0    atorvastatin (LIPITOR) 10 MG tablet, Take 1 tablet by mouth every night at bedtime., Disp: 90 tablet, Rfl: 1    buPROPion (WELLBUTRIN) 100 MG tablet, Take 1 tablet by mouth 2 (Two) Times a Day., Disp: 180 tablet, Rfl: 1    chlorthalidone (HYGROTON) 25 MG tablet, Take 1 tablet by mouth Daily., Disp: 90 tablet, Rfl: 1    escitalopram (LEXAPRO) 10 MG tablet, Take 1 tablet by mouth Daily., Disp: 90 tablet, Rfl: 0    levothyroxine (SYNTHROID, LEVOTHROID) 25 MCG tablet, Take 1 tablet by mouth Daily., Disp: 90 tablet, Rfl: 1    losartan (COZAAR) 100 MG tablet, Take 1 tablet by mouth Daily., Disp: 90 tablet, Rfl: 1    meclizine (ANTIVERT) 25 MG tablet, Take 1 tablet by mouth 3 (Three) Times a Day As Needed for Dizziness., Disp: 30 tablet, Rfl: 0    metFORMIN ER (GLUCOPHAGE-XR) 500 MG 24 hr tablet, Take 1 tablet by mouth 2 (Two) Times a Day., Disp: 180 tablet, Rfl: 1    metoprolol succinate XL (TOPROL-XL) 25 MG 24 hr tablet, Take 1 tablet by mouth Daily., Disp: 90 tablet, Rfl: 0    multivitamin with minerals tablet tablet, Take 1 tablet by mouth Daily., Disp: , Rfl:     sennosides-docusate (PERICOLACE) 8.6-50 MG per tablet, Take 2 tablets by mouth 2 (Two) Times a Day As Needed for Constipation., Disp: 20 tablet, Rfl: 0    nitrofurantoin, macrocrystal-monohydrate, (Macrobid) 100 MG capsule, Take 1 capsule by mouth 2 (Two) Times a Day., Disp: 10 capsule, Rfl: 0   Past Medical History:   Diagnosis Date    Chronic pain     BACK, NO LONGER SEES PM CLINIC    Diabetes     Essential (primary) hypertension     Hyperlipidemia, unspecified     Hypothyroidism, unspecified     Low back pain     Major depressive disorder, recurrent, moderate     Primary generalized (osteo)arthritis     L KNEE    Michael Mountain spotted fever     DX 2014 NO CURRENT S/S    Spinal stenosis, lumbar region without neurogenic  claudication     Torn rotator cuff     RIGHT    Vitamin D deficiency, unspecified     Zoster without complications      Allergies   Allergen Reactions    Adhesive Tape Rash     BLISTERS/RASH TO SKIN W/TKA 1/2023               Result Review :     Common labs          6/21/2024    09:58   Common Labs   Glucose 113    BUN 18    Creatinine 1.04    Sodium 138    Potassium 4.5    Chloride 103    Calcium 9.8    Albumin 4.2    Total Bilirubin 0.4    Alkaline Phosphatase 64    AST (SGOT) 18    ALT (SGPT) 15    WBC 7.69    Hemoglobin 13.0    Hematocrit 39.4    Platelets 263    Total Cholesterol 136    Triglycerides 147    HDL Cholesterol 40    LDL Cholesterol  70    Hemoglobin A1C 6.10         No Images in the past 120 days found..           Social History     Tobacco Use   Smoking Status Never    Passive exposure: Past   Smokeless Tobacco Never           Assessment and Plan    Diagnoses and all orders for this visit:    1. Leukocytes in urine (Primary)  Assessment & Plan:  Uristat may cause false positive nitrites on urinalysis.  Sending urine sample for urine culture.  Patient reports symptoms are moderate to severe.  Will electively start on Macrobid and await urine culture results.  Suggested patient repeating a urine a couple weeks after completion of antibiotics to ensure resolution.    Orders:  -     Urine Culture - Urine, Urine, Clean Catch; Future  -     Urine Culture - Urine, Urine, Clean Catch    2. Dysuria  -     POCT urinalysis dipstick, automated  -     Urine Culture - Urine, Urine, Clean Catch; Future  -     nitrofurantoin, macrocrystal-monohydrate, (Macrobid) 100 MG capsule; Take 1 capsule by mouth 2 (Two) Times a Day.  Dispense: 10 capsule; Refill: 0  -     Urine Culture - Urine, Urine, Clean Catch        Follow Up    Return if symptoms worsen or fail to improve.  Patient was given instructions and counseling regarding her condition or for health maintenance advice. Please see specific information pulled into  the AVS if appropriate.

## 2024-12-20 DIAGNOSIS — R30.0 DYSURIA: Primary | ICD-10-CM

## 2024-12-20 LAB — BACTERIA SPEC AEROBE CULT: NORMAL

## 2024-12-20 NOTE — PROGRESS NOTES
On macrobid due to extent of symptoms.   Ok to complete macrobid since she is symptomatic.  Repeat urinalysis in 2 weeks.

## 2024-12-31 DIAGNOSIS — I10 ESSENTIAL (PRIMARY) HYPERTENSION: ICD-10-CM

## 2024-12-31 DIAGNOSIS — F33.1 MAJOR DEPRESSIVE DISORDER, RECURRENT, MODERATE: ICD-10-CM

## 2024-12-31 DIAGNOSIS — R30.0 DYSURIA: ICD-10-CM

## 2024-12-31 RX ORDER — ESCITALOPRAM OXALATE 10 MG/1
10 TABLET ORAL DAILY
Qty: 90 TABLET | Refills: 0 | Status: SHIPPED | OUTPATIENT
Start: 2024-12-31

## 2024-12-31 RX ORDER — AMLODIPINE BESYLATE 2.5 MG/1
2.5 TABLET ORAL DAILY
Qty: 90 TABLET | Refills: 0 | Status: SHIPPED | OUTPATIENT
Start: 2024-12-31

## 2024-12-31 RX ORDER — NITROFURANTOIN 25; 75 MG/1; MG/1
100 CAPSULE ORAL 2 TIMES DAILY
Qty: 10 CAPSULE | Refills: 0 | OUTPATIENT
Start: 2024-12-31

## 2025-01-03 ENCOUNTER — LAB (OUTPATIENT)
Dept: LAB | Facility: HOSPITAL | Age: 75
End: 2025-01-03
Payer: MEDICARE

## 2025-01-03 ENCOUNTER — TELEPHONE (OUTPATIENT)
Dept: FAMILY MEDICINE CLINIC | Age: 75
End: 2025-01-03

## 2025-01-03 ENCOUNTER — OFFICE VISIT (OUTPATIENT)
Dept: FAMILY MEDICINE CLINIC | Age: 75
End: 2025-01-03
Payer: MEDICARE

## 2025-01-03 VITALS
TEMPERATURE: 98.3 F | DIASTOLIC BLOOD PRESSURE: 60 MMHG | HEART RATE: 68 BPM | HEIGHT: 62 IN | WEIGHT: 216.4 LBS | OXYGEN SATURATION: 95 % | SYSTOLIC BLOOD PRESSURE: 101 MMHG | BODY MASS INDEX: 39.82 KG/M2

## 2025-01-03 DIAGNOSIS — I10 ESSENTIAL (PRIMARY) HYPERTENSION: ICD-10-CM

## 2025-01-03 DIAGNOSIS — E03.9 HYPOTHYROIDISM, UNSPECIFIED TYPE: ICD-10-CM

## 2025-01-03 DIAGNOSIS — Z00.00 MEDICARE ANNUAL WELLNESS VISIT, SUBSEQUENT: ICD-10-CM

## 2025-01-03 DIAGNOSIS — E78.2 MIXED HYPERLIPIDEMIA: ICD-10-CM

## 2025-01-03 DIAGNOSIS — H93.13 TINNITUS OF BOTH EARS: ICD-10-CM

## 2025-01-03 DIAGNOSIS — Z78.0 MENOPAUSE: ICD-10-CM

## 2025-01-03 DIAGNOSIS — R30.0 DYSURIA: ICD-10-CM

## 2025-01-03 DIAGNOSIS — R42 DIZZINESS: ICD-10-CM

## 2025-01-03 DIAGNOSIS — R53.83 OTHER FATIGUE: ICD-10-CM

## 2025-01-03 DIAGNOSIS — E11.9 TYPE 2 DIABETES MELLITUS WITHOUT COMPLICATION, WITHOUT LONG-TERM CURRENT USE OF INSULIN: ICD-10-CM

## 2025-01-03 DIAGNOSIS — Z12.12 SCREENING FOR COLORECTAL CANCER: ICD-10-CM

## 2025-01-03 DIAGNOSIS — Z12.31 SCREENING MAMMOGRAM FOR BREAST CANCER: ICD-10-CM

## 2025-01-03 DIAGNOSIS — Z12.11 SCREENING FOR COLORECTAL CANCER: ICD-10-CM

## 2025-01-03 DIAGNOSIS — R26.89 BALANCE PROBLEM: ICD-10-CM

## 2025-01-03 LAB
ALBUMIN SERPL-MCNC: 4 G/DL (ref 3.5–5.2)
ALBUMIN UR-MCNC: <1.2 MG/DL
ALBUMIN/GLOB SERPL: 1.3 G/DL
ALP SERPL-CCNC: 52 U/L (ref 39–117)
ALT SERPL W P-5'-P-CCNC: 20 U/L (ref 1–33)
ANION GAP SERPL CALCULATED.3IONS-SCNC: 14.6 MMOL/L (ref 5–15)
AST SERPL-CCNC: 19 U/L (ref 1–32)
BACTERIA UR QL AUTO: ABNORMAL /HPF
BASOPHILS # BLD AUTO: 0.07 10*3/MM3 (ref 0–0.2)
BASOPHILS NFR BLD AUTO: 0.5 % (ref 0–1.5)
BILIRUB SERPL-MCNC: 0.6 MG/DL (ref 0–1.2)
BILIRUB UR QL STRIP: ABNORMAL
BUN SERPL-MCNC: 18 MG/DL (ref 8–23)
BUN/CREAT SERPL: 15.7 (ref 7–25)
CALCIUM SPEC-SCNC: 11.5 MG/DL (ref 8.6–10.5)
CHLORIDE SERPL-SCNC: 92 MMOL/L (ref 98–107)
CHOLEST SERPL-MCNC: 130 MG/DL (ref 0–200)
CLARITY UR: ABNORMAL
CO2 SERPL-SCNC: 30.4 MMOL/L (ref 22–29)
COLOR UR: ABNORMAL
CREAT SERPL-MCNC: 1.15 MG/DL (ref 0.57–1)
CREAT UR-MCNC: 172.4 MG/DL
DEPRECATED RDW RBC AUTO: 44.3 FL (ref 37–54)
EGFRCR SERPLBLD CKD-EPI 2021: 50.4 ML/MIN/1.73
EOSINOPHIL # BLD AUTO: 1.56 10*3/MM3 (ref 0–0.4)
EOSINOPHIL NFR BLD AUTO: 11.2 % (ref 0.3–6.2)
ERYTHROCYTE [DISTWIDTH] IN BLOOD BY AUTOMATED COUNT: 13.8 % (ref 12.3–15.4)
GLOBULIN UR ELPH-MCNC: 3.2 GM/DL
GLUCOSE SERPL-MCNC: 154 MG/DL (ref 65–99)
GLUCOSE UR STRIP-MCNC: ABNORMAL MG/DL
HCT VFR BLD AUTO: 38.4 % (ref 34–46.6)
HDLC SERPL-MCNC: 41 MG/DL (ref 40–60)
HGB BLD-MCNC: 12.9 G/DL (ref 12–15.9)
HGB UR QL STRIP.AUTO: ABNORMAL
IMM GRANULOCYTES # BLD AUTO: 0.07 10*3/MM3 (ref 0–0.05)
IMM GRANULOCYTES NFR BLD AUTO: 0.5 % (ref 0–0.5)
KETONES UR QL STRIP: ABNORMAL
LDLC SERPL CALC-MCNC: 60 MG/DL (ref 0–100)
LDLC/HDLC SERPL: 1.32 {RATIO}
LEUKOCYTE ESTERASE UR QL STRIP.AUTO: ABNORMAL
LYMPHOCYTES # BLD AUTO: 2.75 10*3/MM3 (ref 0.7–3.1)
LYMPHOCYTES NFR BLD AUTO: 19.7 % (ref 19.6–45.3)
MCH RBC QN AUTO: 29.7 PG (ref 26.6–33)
MCHC RBC AUTO-ENTMCNC: 33.6 G/DL (ref 31.5–35.7)
MCV RBC AUTO: 88.5 FL (ref 79–97)
MICROALBUMIN/CREAT UR: NORMAL MG/G{CREAT}
MONOCYTES # BLD AUTO: 0.91 10*3/MM3 (ref 0.1–0.9)
MONOCYTES NFR BLD AUTO: 6.5 % (ref 5–12)
NEUTROPHILS NFR BLD AUTO: 61.6 % (ref 42.7–76)
NEUTROPHILS NFR BLD AUTO: 8.62 10*3/MM3 (ref 1.7–7)
NITRITE UR QL STRIP: ABNORMAL
PH UR STRIP.AUTO: ABNORMAL [PH]
PLATELET # BLD AUTO: 325 10*3/MM3 (ref 140–450)
PMV BLD AUTO: 8.9 FL (ref 6–12)
POTASSIUM SERPL-SCNC: 3.8 MMOL/L (ref 3.5–5.2)
PROT SERPL-MCNC: 7.2 G/DL (ref 6–8.5)
PROT UR QL STRIP: ABNORMAL
RBC # BLD AUTO: 4.34 10*6/MM3 (ref 3.77–5.28)
RBC # UR STRIP: ABNORMAL /HPF
REF LAB TEST METHOD: ABNORMAL
SODIUM SERPL-SCNC: 137 MMOL/L (ref 136–145)
SP GR UR STRIP: 1.02 (ref 1–1.03)
SQUAMOUS #/AREA URNS HPF: ABNORMAL /HPF
TRIGL SERPL-MCNC: 174 MG/DL (ref 0–150)
TSH SERPL DL<=0.05 MIU/L-ACNC: 2.97 UIU/ML (ref 0.27–4.2)
UROBILINOGEN UR QL STRIP: ABNORMAL
VIT B12 BLD-MCNC: 716 PG/ML (ref 211–946)
VLDLC SERPL-MCNC: 29 MG/DL (ref 5–40)
WBC # UR STRIP: ABNORMAL /HPF
WBC NRBC COR # BLD AUTO: 13.98 10*3/MM3 (ref 3.4–10.8)

## 2025-01-03 PROCEDURE — 36415 COLL VENOUS BLD VENIPUNCTURE: CPT

## 2025-01-03 PROCEDURE — 80053 COMPREHEN METABOLIC PANEL: CPT

## 2025-01-03 PROCEDURE — 87086 URINE CULTURE/COLONY COUNT: CPT

## 2025-01-03 PROCEDURE — 82607 VITAMIN B-12: CPT

## 2025-01-03 PROCEDURE — 84443 ASSAY THYROID STIM HORMONE: CPT

## 2025-01-03 PROCEDURE — 1160F RVW MEDS BY RX/DR IN RCRD: CPT | Performed by: NURSE PRACTITIONER

## 2025-01-03 PROCEDURE — 82570 ASSAY OF URINE CREATININE: CPT

## 2025-01-03 PROCEDURE — 99214 OFFICE O/P EST MOD 30 MIN: CPT | Performed by: NURSE PRACTITIONER

## 2025-01-03 PROCEDURE — 81001 URINALYSIS AUTO W/SCOPE: CPT

## 2025-01-03 PROCEDURE — 1170F FXNL STATUS ASSESSED: CPT | Performed by: NURSE PRACTITIONER

## 2025-01-03 PROCEDURE — 85025 COMPLETE CBC W/AUTO DIFF WBC: CPT

## 2025-01-03 PROCEDURE — 80061 LIPID PANEL: CPT

## 2025-01-03 PROCEDURE — 3074F SYST BP LT 130 MM HG: CPT | Performed by: NURSE PRACTITIONER

## 2025-01-03 PROCEDURE — 82043 UR ALBUMIN QUANTITATIVE: CPT

## 2025-01-03 PROCEDURE — 3078F DIAST BP <80 MM HG: CPT | Performed by: NURSE PRACTITIONER

## 2025-01-03 PROCEDURE — 83036 HEMOGLOBIN GLYCOSYLATED A1C: CPT

## 2025-01-03 PROCEDURE — G0439 PPPS, SUBSEQ VISIT: HCPCS | Performed by: NURSE PRACTITIONER

## 2025-01-03 PROCEDURE — 93000 ELECTROCARDIOGRAM COMPLETE: CPT | Performed by: NURSE PRACTITIONER

## 2025-01-03 PROCEDURE — 1159F MED LIST DOCD IN RCRD: CPT | Performed by: NURSE PRACTITIONER

## 2025-01-03 RX ORDER — METFORMIN HYDROCHLORIDE 500 MG/1
500 TABLET, EXTENDED RELEASE ORAL 2 TIMES DAILY
Qty: 180 TABLET | Refills: 1 | Status: SHIPPED | OUTPATIENT
Start: 2025-01-03

## 2025-01-03 RX ORDER — LEVOTHYROXINE SODIUM 25 UG/1
25 TABLET ORAL DAILY
Qty: 90 TABLET | Refills: 1 | Status: SHIPPED | OUTPATIENT
Start: 2025-01-03

## 2025-01-03 RX ORDER — ATORVASTATIN CALCIUM 10 MG/1
10 TABLET, FILM COATED ORAL
Qty: 90 TABLET | Refills: 1 | Status: SHIPPED | OUTPATIENT
Start: 2025-01-03

## 2025-01-03 RX ORDER — METOPROLOL SUCCINATE 25 MG/1
25 TABLET, EXTENDED RELEASE ORAL DAILY
Qty: 90 TABLET | Refills: 0 | Status: SHIPPED | OUTPATIENT
Start: 2025-01-03

## 2025-01-03 NOTE — PROGRESS NOTES
Subjective   The ABCs of the Annual Wellness Visit  Medicare Wellness Visit      Kim Owen is a 74 y.o. patient who presents for a Medicare Wellness Visit.    The following portions of the patient's history were reviewed and   updated as appropriate: allergies, current medications, past family history, past medical history, past social history, past surgical history, and problem list.    Compared to one year ago, the patient's physical   health is worse.  Compared to one year ago, the patient's mental   health is worse.    Recent Hospitalizations:  She was not admitted to the hospital during the last year.     Current Medical Providers:  Patient Care Team:  Lindy Chavarria APRN as PCP - General (Nurse Practitioner)  BeenLexa MD as Surgeon (Orthopedic Surgery)    Outpatient Medications Prior to Visit   Medication Sig Dispense Refill    amLODIPine (NORVASC) 2.5 MG tablet Take 1 tablet by mouth once daily 90 tablet 0    buPROPion (WELLBUTRIN) 100 MG tablet Take 1 tablet by mouth 2 (Two) Times a Day. 180 tablet 1    chlorthalidone (HYGROTON) 25 MG tablet Take 1 tablet by mouth Daily. 90 tablet 1    escitalopram (LEXAPRO) 10 MG tablet Take 1 tablet by mouth once daily 90 tablet 0    losartan (COZAAR) 100 MG tablet Take 1 tablet by mouth Daily. 90 tablet 1    meclizine (ANTIVERT) 25 MG tablet Take 1 tablet by mouth 3 (Three) Times a Day As Needed for Dizziness. 30 tablet 0    multivitamin with minerals tablet tablet Take 1 tablet by mouth Daily.      sennosides-docusate (PERICOLACE) 8.6-50 MG per tablet Take 2 tablets by mouth 2 (Two) Times a Day As Needed for Constipation. 20 tablet 0    atorvastatin (LIPITOR) 10 MG tablet Take 1 tablet by mouth every night at bedtime. 90 tablet 1    levothyroxine (SYNTHROID, LEVOTHROID) 25 MCG tablet Take 1 tablet by mouth Daily. 90 tablet 1    metFORMIN ER (GLUCOPHAGE-XR) 500 MG 24 hr tablet Take 1 tablet by mouth 2 (Two) Times a Day. 180 tablet 1    metoprolol succinate  "XL (TOPROL-XL) 25 MG 24 hr tablet Take 1 tablet by mouth Daily. 90 tablet 0    nitrofurantoin, macrocrystal-monohydrate, (Macrobid) 100 MG capsule Take 1 capsule by mouth 2 (Two) Times a Day. 10 capsule 0     No facility-administered medications prior to visit.     No opioid medication identified on active medication list. I have reviewed chart for other potential  high risk medication/s and harmful drug interactions in the elderly.      Aspirin is not on active medication list.  Aspirin use is not indicated based on review of current medical condition/s. Risk of harm outweighs potential benefits.  .    Patient Active Problem List   Diagnosis    Essential (primary) hypertension    Hyperlipidemia, unspecified    Hypothyroidism, unspecified    Other specified disorders of bone density and structure, unspecified site    Vitamin D deficiency, unspecified    Major depressive disorder, recurrent, moderate    Primary generalized (osteo)arthritis    Spinal stenosis, lumbar region without neurogenic claudication    Chronic pain    Low back pain    Other long term (current) drug therapy    Spondylosis without myelopathy    Renal insufficiency    Degeneration of lumbar intervertebral disc    Anxiety    S/P lumbar fusion    Type 2 diabetes mellitus without complication, without long-term current use of insulin    Osteoarthrosis, localized, primary, knee, left    Rotator cuff arthropathy of right shoulder    Tear of right rotator cuff    Leukocytes in urine    Dysuria     Advance Care Planning Advance Directive is on file.  ACP discussion was held with the patient during this visit. Patient has an advance directive in EMR which is still valid.             Objective   Vitals:    01/03/25 0905   BP: 101/60   Pulse: 68   Temp: 98.3 °F (36.8 °C)   TempSrc: Oral   SpO2: 95%   Weight: 98.2 kg (216 lb 6.4 oz)   Height: 157.5 cm (62\")       Estimated body mass index is 39.58 kg/m² as calculated from the following:    Height as of this " "encounter: 157.5 cm (62\").    Weight as of this encounter: 98.2 kg (216 lb 6.4 oz).                Does the patient have evidence of cognitive impairment? No       ECG 12 Lead    Date/Time: 1/3/2025 10:13 AM  Performed by: Lindy Chavarria APRN    Authorized by: Lindy Chavarria APRN  Comparison: compared with previous ECG from 7/27/2023  Similar to previous ECG  Rhythm: sinus rhythm  Rate: normal  BPM: 63  Conduction: conduction normal  ST Segments: ST segments normal  T Waves: T waves normal  Other findings: right atrial abnormality    Clinical impression: non-specific ECG                                                                                                Health  Risk Assessment    Smoking Status:  Social History     Tobacco Use   Smoking Status Never    Passive exposure: Past   Smokeless Tobacco Never     Alcohol Consumption:  Social History     Substance and Sexual Activity   Alcohol Use Not Currently       Fall Risk Screen  STEADI Fall Risk Assessment was completed, and patient is at HIGH risk for falls. Assessment completed on:1/3/2025    Depression Screening   Little interest or pleasure in doing things? Almost all   Feeling down, depressed, or hopeless? Almost all   PHQ-2 Total Score 6   Trouble falling or staying asleep, or sleeping too much? Several days   Feeling tired or having little energy? Several days   Poor appetite or overeating? Several days   Feeling bad about yourself - or that you are a failure or have let yourself or your family down? Several days   Trouble concentrating on things, such as reading the newspaper or watching television? Several days   Moving or speaking so slowly that other people could have noticed? Or the opposite - being so fidgety or restless that you have been moving around a lot more than usual? Almost all   Thoughts that you would be better off dead, or of hurting yourself in some way? Not at all   PHQ-9 Total Score 14   If you checked off any problems, how " difficult have these problems made it for you to do your work, take care of things at home, or get along with other people? Very difficult      Health Habits and Functional and Cognitive Screenin/3/2025     9:08 AM   Functional & Cognitive Status   Do you have difficulty preparing food and eating? Yes   Do you have difficulty bathing yourself, getting dressed or grooming yourself? Yes   Do you have difficulty using the toilet? Yes   Do you have difficulty moving around from place to place? Yes   Do you have trouble with steps or getting out of a bed or a chair? Yes   Current Diet Well Balanced Diet   Dental Exam Up to date   Eye Exam Up to date   Exercise (times per week) 0 times per week   Current Exercises Include No Regular Exercise   Do you need help using the phone?  No   Are you deaf or do you have serious difficulty hearing?  No   Do you need help to go to places out of walking distance? Yes   Do you need help shopping? Yes   Do you need help preparing meals?  Yes   Do you need help with housework?  Yes   Do you need help with laundry? Yes   Do you need help taking your medications? No   Do you need help managing money? No   Do you ever drive or ride in a car without wearing a seat belt? No   Have you felt unusual stress, anger or loneliness in the last month? No   Who do you live with? Spouse   If you need help, do you have trouble finding someone available to you? No   Have you been bothered in the last four weeks by sexual problems? No   Do you have difficulty concentrating, remembering or making decisions? Yes           Age-appropriate Screening Schedule:  Refer to the list below for future screening recommendations based on patient's age, sex and/or medical conditions. Orders for these recommended tests are listed in the plan section. The patient has been provided with a written plan.    Health Maintenance List  Health Maintenance   Topic Date Due    DIABETIC EYE EXAM  2021    DXA SCAN   03/24/2024    COLORECTAL CANCER SCREENING  12/20/2024    ANNUAL WELLNESS VISIT  12/21/2024    MAMMOGRAM  02/07/2025    ZOSTER VACCINE (2 of 2) 01/03/2025 (Originally 11/21/2023)    COVID-19 Vaccine (6 - 2024-25 season) 01/05/2025 (Originally 9/1/2024)    TDAP/TD VACCINES (1 - Tdap) 01/03/2026 (Originally 7/23/1969)    LIPID PANEL  06/21/2025    BMI FOLLOWUP  11/22/2025    DIABETIC FOOT EXAM  01/03/2026    HEPATITIS C SCREENING  Completed    INFLUENZA VACCINE  Completed    Pneumococcal Vaccine 65+  Completed    HEMOGLOBIN A1C  Discontinued    URINE MICROALBUMIN  Discontinued                                                                                                                                                CMS Preventative Services Quick Reference  Risk Factors Identified During Encounter  Fall Risk-High or Moderate: Discussed Fall Prevention in the home  Immunizations Discussed/Encouraged: Tdap, Shingrix, and COVID19    The above risks/problems have been discussed with the patient.  Pertinent information has been shared with the patient in the After Visit Summary.  An After Visit Summary and PPPS were made available to the patient.    Follow Up:   Next Medicare Wellness visit to be scheduled in 1 year.         Additional E&M Note during same encounter follows:  Patient has additional, significant, and separately identifiable condition(s)/problem(s) that require work above and beyond the Medicare Wellness Visit     Chief Complaint  Medicare Wellness-subsequent    Subjective   HPI  Kim is also being seen today for additional medical problem/s.  Kim is following up on HTN. She is on losartan, chlorthalidone, Toprol XL, and amlodipine.    On atorvastatin for hyperlipidemia. Tolerating well.   Taking Metformin for diabetes. Last A1C 6.1. Reports UTD eye exam with [facility] in [place]. Records have been requested but not received.   On levothyroxine for hypothyroidism. Last TSH normal.   Osteopenia on  "bone density scan. Was previously taking calcium but was taken off by previous provider. She reports getting plenty of calcium in diet. Taking Vitamin D3 daily. Also takes MVI. Declines repeat bone density scan.   Taking Wellbutrin and Lexapro for depression. Stable.   She has not heard anything about ENT appointment. Still having dizziness. She reports that she fell and hit her head the Friday before Christmas and last Sunday. She denies any symptoms prior to falling. Feels like losing balance. Denies LOC.   Additionally, she notes some fatigue. This has been going on over the last couple of weeks.   Still having dysuria. Recent urine culture with 25,000 mixed janis. Completed macrobid course. Denies gross hematuria. She has been taking Uristat.       Review of Systems   Constitutional:  Positive for fatigue and fever (subjective 2 weeks ago - now resolved). Negative for chills.   HENT:  Positive for ear pain. Negative for sore throat.    Eyes:  Negative for photophobia and visual disturbance.   Respiratory:  Negative for cough and shortness of breath.    Cardiovascular:  Negative for chest pain and palpitations.   Gastrointestinal:  Negative for abdominal pain and rectal pain.   Genitourinary:  Positive for dysuria and frequency. Negative for vaginal discharge.   Neurological:  Positive for dizziness.   Psychiatric/Behavioral:  Negative for hallucinations and suicidal ideas.         Objective   Vital Signs:  /60   Pulse 68   Temp 98.3 °F (36.8 °C) (Oral)   Ht 157.5 cm (62\")   Wt 98.2 kg (216 lb 6.4 oz)   SpO2 95%   BMI 39.58 kg/m²     Physical Exam  Vitals reviewed.   Constitutional:       General: She is not in acute distress.     Appearance: She is well-developed.   HENT:      Head: Normocephalic and atraumatic.      Right Ear: Tympanic membrane and ear canal normal.      Left Ear: Tympanic membrane and ear canal normal.      Mouth/Throat:      Mouth: Mucous membranes are moist.      Comments: Uvula " midline  Eyes:      Extraocular Movements: Extraocular movements intact.      Pupils: Pupils are equal, round, and reactive to light.   Cardiovascular:      Rate and Rhythm: Normal rate and regular rhythm.      Pulses:           Dorsalis pedis pulses are 2+ on the right side and 2+ on the left side.   Pulmonary:      Effort: Pulmonary effort is normal.      Breath sounds: Normal breath sounds.   Musculoskeletal:      Comments: Using cane   Feet:      Right foot:      Protective Sensation: 3 sites tested.  3 sites sensed.      Skin integrity: Dry skin present. No ulcer or blister.      Toenail Condition: Right toenails are abnormally thick.      Left foot:      Protective Sensation: 3 sites tested.  3 sites sensed.      Skin integrity: Dry skin present. No ulcer or blister.      Toenail Condition: Left toenails are abnormally thick.      Comments: Diabetic Foot Exam Performed and Monofilament Test Performed     Neurological:      Mental Status: She is alert and oriented to person, place, and time.   Psychiatric:         Mood and Affect: Mood and affect normal.                       Assessment and Plan            Medicare annual wellness visit, subsequent  Appropriate screenings and vaccinations were reviewed with the pt and offered as indicated.  Pt counseled on healthy lifestyle including healthy diet, exercise.         Menopause  BDS ordered  Orders:    DEXA Bone Density Axial; Future    Screening mammogram for breast cancer  Screening mammogram ordered  Orders:    Mammo Screening Digital Tomosynthesis Bilateral With CAD; Future    Screening for colorectal cancer  Cologuard order placed  Orders:    Cologuard - Stool, Per Rectum; Future    Mixed hyperlipidemia     Medical condition is stable.  Continue same therapy.  Will recheck at next regular appointment    Orders:    atorvastatin (LIPITOR) 10 MG tablet; Take 1 tablet by mouth every night at bedtime.    Hypothyroidism, unspecified type  Rechecking lab  Orders:     levothyroxine (SYNTHROID, LEVOTHROID) 25 MCG tablet; Take 1 tablet by mouth Daily.    TSH; Future    Essential (primary) hypertension  Medical condition is stable.  Continue same therapy.  Will recheck at next regular appointment      Orders:    metoprolol succinate XL (TOPROL-XL) 25 MG 24 hr tablet; Take 1 tablet by mouth Daily.    Type 2 diabetes mellitus without complication, without long-term current use of insulin  Repeating labs. Have requested eye exam numerous times but still have not received.     Orders:    metFORMIN ER (GLUCOPHAGE-XR) 500 MG 24 hr tablet; Take 1 tablet by mouth 2 (Two) Times a Day.    Comprehensive Metabolic Panel; Future    Lipid Panel; Future    Hemoglobin A1c; Future    Microalbumin / Creatinine Urine Ratio - Urine, Clean Catch; Future    Other fatigue  ECG similar to previous ECG. Checking labs  Orders:    ECG 12 Lead    CBC & Differential; Future    Vitamin B12; Future    TSH; Future    Balance problem  Ordering MRI for further evaluation  Orders:    MRI Brain With & Without Contrast; Future    Dysuria  Repeating urine  Orders:    Urinalysis With Culture If Indicated -; Future    Dizziness  Will take patient to referrals desk today to look into ENT referral.        Tinnitus of both ears  As above               Follow Up   Return in about 6 weeks (around 2/14/2025) for Recheck.  Patient was given instructions and counseling regarding her condition or for health maintenance advice. Please see specific information pulled into the AVS if appropriate.

## 2025-01-03 NOTE — ASSESSMENT & PLAN NOTE
Rechecking lab  Orders:    levothyroxine (SYNTHROID, LEVOTHROID) 25 MCG tablet; Take 1 tablet by mouth Daily.    TSH; Future

## 2025-01-03 NOTE — TELEPHONE ENCOUNTER
Please request copy of last diabetic eye exam from [facility] in [facility]. Yes, I know we have already requested this several times but have not received

## 2025-01-03 NOTE — ASSESSMENT & PLAN NOTE
Medical condition is stable.  Continue same therapy.  Will recheck at next regular appointment      Orders:    metoprolol succinate XL (TOPROL-XL) 25 MG 24 hr tablet; Take 1 tablet by mouth Daily.

## 2025-01-03 NOTE — ASSESSMENT & PLAN NOTE
Medical condition is stable.  Continue same therapy.  Will recheck at next regular appointment    Orders:    atorvastatin (LIPITOR) 10 MG tablet; Take 1 tablet by mouth every night at bedtime.

## 2025-01-03 NOTE — ASSESSMENT & PLAN NOTE
Repeating labs. Have requested eye exam numerous times but still have not received.     Orders:    metFORMIN ER (GLUCOPHAGE-XR) 500 MG 24 hr tablet; Take 1 tablet by mouth 2 (Two) Times a Day.    Comprehensive Metabolic Panel; Future    Lipid Panel; Future    Hemoglobin A1c; Future    Microalbumin / Creatinine Urine Ratio - Urine, Clean Catch; Future

## 2025-01-04 LAB — HBA1C MFR BLD: 6.5 % (ref 4.8–5.6)

## 2025-01-05 LAB — BACTERIA SPEC AEROBE CULT: NORMAL

## 2025-01-07 RX ORDER — CIPROFLOXACIN 500 MG/1
500 TABLET, FILM COATED ORAL 2 TIMES DAILY
Qty: 20 TABLET | Refills: 0 | Status: SHIPPED | OUTPATIENT
Start: 2025-01-07

## 2025-01-10 ENCOUNTER — HOSPITAL ENCOUNTER (OUTPATIENT)
Dept: MRI IMAGING | Facility: HOSPITAL | Age: 75
Discharge: HOME | End: 2025-01-10
Payer: MEDICARE

## 2025-01-10 DIAGNOSIS — R26.89 BALANCE PROBLEM: ICD-10-CM

## 2025-01-10 PROCEDURE — 25510000002 GADOBENATE DIMEGLUMINE 529 MG/ML SOLUTION: Performed by: NURSE PRACTITIONER

## 2025-01-10 PROCEDURE — 70553 MRI BRAIN STEM W/O & W/DYE: CPT

## 2025-01-10 PROCEDURE — A9577 INJ MULTIHANCE: HCPCS | Performed by: NURSE PRACTITIONER

## 2025-01-10 RX ADMIN — GADOBENATE DIMEGLUMINE 20 ML: 529 INJECTION, SOLUTION INTRAVENOUS at 11:02

## 2025-01-19 DIAGNOSIS — I10 ESSENTIAL (PRIMARY) HYPERTENSION: ICD-10-CM

## 2025-01-20 ENCOUNTER — TELEPHONE (OUTPATIENT)
Dept: FAMILY MEDICINE CLINIC | Age: 75
End: 2025-01-20
Payer: MEDICARE

## 2025-01-20 RX ORDER — LOSARTAN POTASSIUM 100 MG/1
100 TABLET ORAL DAILY
Qty: 90 TABLET | Refills: 0 | Status: SHIPPED | OUTPATIENT
Start: 2025-01-20

## 2025-01-20 NOTE — TELEPHONE ENCOUNTER
----- Message from Jonah MILLS sent at 1/20/2025  9:21 AM EST -----    ----- Message -----  From: SYSTEM  Sent: 1/18/2025   1:00 AM EST  [facility]

## 2025-02-13 ENCOUNTER — LAB (OUTPATIENT)
Dept: LAB | Facility: HOSPITAL | Age: 75
End: 2025-02-13
Payer: MEDICARE

## 2025-02-13 ENCOUNTER — OFFICE VISIT (OUTPATIENT)
Dept: FAMILY MEDICINE CLINIC | Age: 75
End: 2025-02-13
Payer: MEDICARE

## 2025-02-13 VITALS
BODY MASS INDEX: 40.27 KG/M2 | SYSTOLIC BLOOD PRESSURE: 112 MMHG | HEIGHT: 62 IN | HEART RATE: 62 BPM | OXYGEN SATURATION: 95 % | WEIGHT: 218.8 LBS | DIASTOLIC BLOOD PRESSURE: 67 MMHG | TEMPERATURE: 97.8 F

## 2025-02-13 DIAGNOSIS — N28.9 ABNORMAL RENAL FUNCTION: ICD-10-CM

## 2025-02-13 DIAGNOSIS — D72.829 LEUKOCYTOSIS, UNSPECIFIED TYPE: ICD-10-CM

## 2025-02-13 DIAGNOSIS — H81.10 BENIGN PAROXYSMAL POSITIONAL VERTIGO, UNSPECIFIED LATERALITY: ICD-10-CM

## 2025-02-13 DIAGNOSIS — Z23 ENCOUNTER FOR IMMUNIZATION: ICD-10-CM

## 2025-02-13 LAB
ANION GAP SERPL CALCULATED.3IONS-SCNC: 10 MMOL/L (ref 5–15)
BUN SERPL-MCNC: 19 MG/DL (ref 8–23)
BUN/CREAT SERPL: 17.8 (ref 7–25)
CALCIUM SPEC-SCNC: 10.3 MG/DL (ref 8.6–10.5)
CHLORIDE SERPL-SCNC: 100 MMOL/L (ref 98–107)
CO2 SERPL-SCNC: 28 MMOL/L (ref 22–29)
CREAT SERPL-MCNC: 1.07 MG/DL (ref 0.57–1)
DEPRECATED RDW RBC AUTO: 43.3 FL (ref 37–54)
EGFRCR SERPLBLD CKD-EPI 2021: 55 ML/MIN/1.73
ERYTHROCYTE [DISTWIDTH] IN BLOOD BY AUTOMATED COUNT: 13.1 % (ref 12.3–15.4)
GLUCOSE SERPL-MCNC: 117 MG/DL (ref 65–99)
HCT VFR BLD AUTO: 37.6 % (ref 34–46.6)
HGB BLD-MCNC: 12.7 G/DL (ref 12–15.9)
MCH RBC QN AUTO: 30.2 PG (ref 26.6–33)
MCHC RBC AUTO-ENTMCNC: 33.8 G/DL (ref 31.5–35.7)
MCV RBC AUTO: 89.5 FL (ref 79–97)
PLATELET # BLD AUTO: 257 10*3/MM3 (ref 140–450)
PMV BLD AUTO: 9.4 FL (ref 6–12)
POTASSIUM SERPL-SCNC: 4.2 MMOL/L (ref 3.5–5.2)
RBC # BLD AUTO: 4.2 10*6/MM3 (ref 3.77–5.28)
SODIUM SERPL-SCNC: 138 MMOL/L (ref 136–145)
WBC NRBC COR # BLD AUTO: 6.48 10*3/MM3 (ref 3.4–10.8)

## 2025-02-13 PROCEDURE — 36415 COLL VENOUS BLD VENIPUNCTURE: CPT

## 2025-02-13 PROCEDURE — 80048 BASIC METABOLIC PNL TOTAL CA: CPT

## 2025-02-13 PROCEDURE — 85027 COMPLETE CBC AUTOMATED: CPT

## 2025-02-13 NOTE — PROGRESS NOTES
Chief Complaint  Kim Owen presents to [facility] for Dizziness (Follow up)    Subjective          History of Present Illness    Kim is here today to follow up on dizziness and tinnitus.   MRI brain on 1/3/25 without acute process, generalized cerebral atrophy and white matter changes likely related chronic microvascular ischemic disease, no abnormal enhancement.   Lab work with elevated WBCs and abnormal kidney labs. Treated for UTI. Reports no further pain since completing antibiotics.   Saw ENT. Treated for BPPV. Reports dizziness and tinnitus now resolved. No falls or balance problems. Feeling much better since last visit.     Review of Systems      Allergies   Allergen Reactions    Adhesive Tape Rash     BLISTERS/RASH TO SKIN W/TKA 1/2023      Past Medical History:   Diagnosis Date    Chronic pain     BACK, NO LONGER SEES PM CLINIC    Diabetes     Essential (primary) hypertension     Hyperlipidemia, unspecified     Hypothyroidism, unspecified     Low back pain     Major depressive disorder, recurrent, moderate     Primary generalized (osteo)arthritis     L KNEE    [place] spotted fever     DX 2014 NO CURRENT S/S    Spinal stenosis, lumbar region without neurogenic claudication     Torn rotator cuff     RIGHT    Vitamin D deficiency, unspecified     Zoster without complications      Current Outpatient Medications   Medication Sig Dispense Refill    amLODIPine (NORVASC) 2.5 MG tablet Take 1 tablet by mouth once daily 90 tablet 0    atorvastatin (LIPITOR) 10 MG tablet Take 1 tablet by mouth every night at bedtime. 90 tablet 1    buPROPion (WELLBUTRIN) 100 MG tablet Take 1 tablet by mouth 2 (Two) Times a Day. 180 tablet 1    chlorthalidone (HYGROTON) 25 MG tablet Take 1 tablet by mouth Daily. 90 tablet 1    escitalopram (LEXAPRO) 10 MG tablet Take 1 tablet by mouth once daily 90 tablet 0    levothyroxine (SYNTHROID, LEVOTHROID) 25 MCG tablet Take 1 tablet by mouth  Daily. 90 tablet 1    losartan (COZAAR) 100 MG tablet Take 1 tablet by mouth once daily 90 tablet 0    metFORMIN ER (GLUCOPHAGE-XR) 500 MG 24 hr tablet Take 1 tablet by mouth 2 (Two) Times a Day. 180 tablet 1    metoprolol succinate XL (TOPROL-XL) 25 MG 24 hr tablet Take 1 tablet by mouth Daily. 90 tablet 0    multivitamin with minerals tablet tablet Take 1 tablet by mouth Daily.       No current facility-administered medications for this visit.     Past Surgical History:   Procedure Laterality Date    COLONOSCOPY  2012    HYSTERECTOMY      AGE 48  ENDOMETRIOSIS, MENORRHAGIA    JOINT REPLACEMENT Right 2016    KNEE    SPINE SURGERY      L5-S1    DR. DEAN HONEYCUTT  5/21    TOTAL KNEE ARTHROPLASTY Left 1/10/2023    Procedure: LEFT TOTAL KNEE ARTHROPLASTY WITH JANET ROBOT;  Surgeon: Lexa Ruano MD;  Location: [facility] MAIN OR;  Service: Orthopedics;  Laterality: Left;    TOTAL SHOULDER ARTHROPLASTY W/ DISTAL CLAVICLE EXCISION Right 7/27/2023    Procedure: TOTAL SHOULDER REVERSE ARTHROPLASTY;  Surgeon: Lexa Ruano MD;  Location: [facility] MAIN OR;  Service: Orthopedics;  Laterality: Right;      Social History     Tobacco Use    Smoking status: Never     Passive exposure: Past    Smokeless tobacco: Never   Vaping Use    Vaping status: Never Used   Substance Use Topics    Alcohol use: Not Currently    Drug use: Never     Family History   Problem Relation Age of Onset    Diabetes type II Father     Alcohol abuse Father     Malig Hyperthermia Neg Hx      Health Maintenance Due   Topic Date Due    DIABETIC EYE EXAM  12/02/2021    DXA SCAN  03/24/2024    COLORECTAL CANCER SCREENING  12/20/2024    MAMMOGRAM  02/07/2025      Immunization History   Administered Date(s) Administered    COVID-19 (PFIZER) 12YRS+ (COMIRNATY) 02/13/2025    COVID-19 (PFIZER) BIVALENT 12+YRS 12/14/2022    COVID-19 (PFIZER) Purple Cap Monovalent 03/15/2021, 04/05/2021, 12/02/2021    Covid-19 (Pfizer) Gray Cap Monovalent 07/19/2022    Fluzone  "High-Dose 65+YRS 11/22/2024    Fluzone High-Dose 65+yrs 10/18/2021, 10/31/2022, 09/26/2023    Influenza, Unspecified 12/02/2020    Pneumococcal Conjugate 20-Valent (PCV20) 07/19/2022    Pneumococcal Polysaccharide (PPSV23) 12/02/2020    Shingrix 09/26/2023        Objective     Vitals:    02/13/25 0853   BP: 112/67   Pulse: 62   Temp: 97.8 °F (36.6 °C)   TempSrc: Oral   SpO2: 95%   Weight: 99.2 kg (218 lb 12.8 oz)   Height: 157.5 cm (62\")     Body mass index is 40.02 kg/m².                No results found.    Physical Exam  Vitals reviewed.   Constitutional:       General: She is not in acute distress.     Appearance: Normal appearance. She is well-developed.   HENT:      Head: Normocephalic and atraumatic.      Right Ear: Tympanic membrane and ear canal normal.      Left Ear: Tympanic membrane and ear canal normal.      Mouth/Throat:      Mouth: Mucous membranes are moist.   Cardiovascular:      Rate and Rhythm: Normal rate and regular rhythm.   Pulmonary:      Effort: Pulmonary effort is normal.      Breath sounds: Normal breath sounds.   Neurological:      Mental Status: She is alert and oriented to person, place, and time.   Psychiatric:         Mood and Affect: Mood and affect normal.           Result Review :                               Assessment and Plan      Assessment & Plan  Benign paroxysmal positional vertigo, unspecified laterality  Saw ENT. Symptoms now resolved. Feeling much better. To follow up with ENT PRN.        Leukocytosis, unspecified type  Repeating labs. Likely due to UTI previously.   Orders:    CBC No Differential; Future    Abnormal renal function  Repeating labs. May have been affected by UTI previously.   Orders:    Basic Metabolic Panel; Future    Encounter for immunization  Updating covid vaccine today.  Orders:    COVID-19 (Pfizer) 12yrs+ (COMIRNATY)              Follow Up     Return in about 5 months (around 7/13/2025) for Recheck.             "

## 2025-02-18 DIAGNOSIS — F33.1 MAJOR DEPRESSIVE DISORDER, RECURRENT, MODERATE: ICD-10-CM

## 2025-02-18 RX ORDER — BUPROPION HYDROCHLORIDE 100 MG/1
100 TABLET ORAL 2 TIMES DAILY
Qty: 180 TABLET | Refills: 0 | Status: SHIPPED | OUTPATIENT
Start: 2025-02-18

## 2025-03-05 ENCOUNTER — TELEPHONE (OUTPATIENT)
Dept: FAMILY MEDICINE CLINIC | Age: 75
End: 2025-03-05
Payer: MEDICARE

## 2025-03-06 NOTE — TELEPHONE ENCOUNTER
Informed patient about pending orders, she had them scheduled for 3/4 but missed her appt, provided number to central scheduling so she can reschedule again.

## 2025-03-14 NOTE — TELEPHONE ENCOUNTER
Caller: Hilaria Owene    Relationship: Self    Best call back number: [phone]    Requested Prescriptions:      atorvastatin (LIPITOR) 10 MG tablet  10 mg, Every Night at Bedtime         Pharmacy where request should be sent: [address] [facility] - [phone] [facility] [phone] FX     Last office visit with prescribing clinician: 1/16/2023   Last telemedicine visit with prescribing clinician: 5/8/2023   Next office visit with prescribing clinician: 6/14/2023     Additional details provided by patient: PATIENT HAS A 3DAY SUPPLY  Does the patient have less than a 3 day supply:  [] Yes  [x] No    Would you like a call back once the refill request has been completed: [] Yes [] No    If the office needs to give you a call back, can they leave a voicemail: [] Yes [] No    Elidia West Rep   05/18/23 13:59 EDT         
Rx Refill Note  Requested Prescriptions     Pending Prescriptions Disp Refills   • atorvastatin (LIPITOR) 10 MG tablet 90 tablet 1     Sig: Take 1 tablet by mouth every night at bedtime.      Last office visit with prescribing clinician: 1/16/2023     Next office visit with prescribing clinician:     Last filled 3/7/23 #90    Agueda Jhaveri LPN  05/18/23, 14:03 EDT  
No

## 2025-04-17 DIAGNOSIS — I10 ESSENTIAL (PRIMARY) HYPERTENSION: ICD-10-CM

## 2025-04-17 RX ORDER — LOSARTAN POTASSIUM 100 MG/1
100 TABLET ORAL DAILY
Qty: 90 TABLET | Refills: 0 | Status: SHIPPED | OUTPATIENT
Start: 2025-04-17

## 2025-05-02 ENCOUNTER — HOSPITAL ENCOUNTER (OUTPATIENT)
Dept: MAMMOGRAPHY | Facility: HOSPITAL | Age: 75
Discharge: HOME | End: 2025-05-02
Payer: MEDICARE

## 2025-05-02 ENCOUNTER — HOSPITAL ENCOUNTER (OUTPATIENT)
Dept: BONE DENSITY | Facility: HOSPITAL | Age: 75
Discharge: HOME | End: 2025-05-02
Payer: MEDICARE

## 2025-05-02 ENCOUNTER — RESULTS FOLLOW-UP (OUTPATIENT)
Dept: FAMILY MEDICINE CLINIC | Age: 75
End: 2025-05-02
Payer: MEDICARE

## 2025-05-02 DIAGNOSIS — Z12.31 SCREENING MAMMOGRAM FOR BREAST CANCER: ICD-10-CM

## 2025-05-02 DIAGNOSIS — Z78.0 MENOPAUSE: ICD-10-CM

## 2025-05-02 PROCEDURE — 77067 SCR MAMMO BI INCL CAD: CPT

## 2025-05-02 PROCEDURE — 77080 DXA BONE DENSITY AXIAL: CPT

## 2025-05-02 PROCEDURE — 77063 BREAST TOMOSYNTHESIS BI: CPT

## 2025-05-02 NOTE — LETTER
Kim Owen  [address] [facility] [address]    May 7, 2025     Kim,  I am pleased to report that your mammogram is normal.   As you know, early detection of cancer is very important.  Although mammography is the most accurate method of early detection, not all cancers are found through mammography.  A thorough exam includes a combination of periodic mammograms, yearly physical examination by your doctor or health care provider and monthly breast self-examinations.  Remember, you should never ignore a breast lump or any other change in your breasts, even if your mammogram is normal. If you find a lump or other change, talk to your healthcare provider about it as soon as possible.  MINGO Erickson  Resulted Orders   Mammo Screening Digital Tomosynthesis Bilateral With CAD    Narrative    MAMMO SCREENING DIGITAL TOMOSYNTHESIS BILATERAL W CAD-     Date of Exam: 5/2/2025 3:07 PM     Indication: Screening.     Comparison: Prior examinations dating back to 9/9/2015     Technique: Routine screening mammogram images were obtained per  protocol.  Tomosynthesis was utilized.  These mammographic images were  interpreted with the assistance of a computer aided detection system.     Breast Density: There are scattered areas of fibroglandular density.     Findings:    No suspicious masses, suspicious microcalcifications, or architectural  distortions are identified.       Impression    No mammographic evidence of malignancy.  Recommend annual screening  mammography.     BI-RADS ASSESSMENT: BI-RADS 2. Benign findings.     Note:  It has been reported that there is approximately a 15% false  negative rate in mammography.  Therefore, management of a palpable  abnormality should not be deferred because of a negative mammogram.     5/2/2025 3:29 PM by NEREIDA PINTO MD on Workstation: [id number]      DEXA Bone Density Axial    Narrative    DEXA BONE DENSITY AXIAL    Date of Exam: 5/2/2025 3:25 PM EDT    Indication:  post menopause.    Comparison: 3/20/2022    Findings:  Lumbar Spine  Unable to evaluate secondary to T-score difference of more than (1) compared to the adjacent vertebra, focal structural abnormalities in or overlying the vertebra such as fractures, previous surgery, degenerative changes, or other internal or external   artifacts.    Femoral Neck  The BMD measured at the left femoral neck is 1.014 g/cm2 with a T-score of -0.2.  The BMD measured at the right femoral neck is 0.957 g/cm2 with a T-score of -0.6.  The BMD of the mean femoral neck is 0.985 g/cm2 with a T score of -0.4.  This patient is considered normal according to World Health Organization (WHO) criteria.  When compared to the previous examination dated 3/24/2022, the bone mineral density of the femoral neck mean has apparently decreased 0.7%.    Total Hip  The BMD of the total left hip is 1.083 g/cm2 with a T-score of 0.6.  The BMD of the total right hip is 1.043 g/cm2 with a T-score of 0.3.  The BMD of the mean total hip is 1.063 g/cm2 with a T-score of 0.4.  This patient is considered normal according to World Health Organization (WHO) criteria.    1/3 Radius  The BMD measured at the left one-third radius is 0.871 g/cm2, corresponding to a T-score of -0.1. This patient is considered normal according to World Health Organization (WHO) criteria. When compared to the previous examination dated 3/24/2022, the bone   mineral density of the left one third radius has apparently decreased 2.6%.    FRAX Score: The estimated 10 year risk of a major osteoporotic fracture is calculated to be 7% and a hip fracture of 0.6%.      Impression    Impression:  Normal bone mineral density      Report dictated by: Kaila Gaytan      I have personally reviewed this case and agree with the findings above:      Electronically Signed: Jose Carlos Yan MD    5/5/2025 2:07 PM EDT    Workstation ID: [id number]

## 2025-05-10 DIAGNOSIS — I10 ESSENTIAL (PRIMARY) HYPERTENSION: ICD-10-CM

## 2025-05-12 RX ORDER — AMLODIPINE BESYLATE 2.5 MG/1
2.5 TABLET ORAL DAILY
Qty: 90 TABLET | Refills: 0 | Status: SHIPPED | OUTPATIENT
Start: 2025-05-12

## 2025-05-13 RX ORDER — CHLORTHALIDONE 25 MG/1
25 TABLET ORAL DAILY
Qty: 90 TABLET | Refills: 0 | Status: SHIPPED | OUTPATIENT
Start: 2025-05-13

## 2025-05-28 DIAGNOSIS — F33.1 MAJOR DEPRESSIVE DISORDER, RECURRENT, MODERATE: ICD-10-CM

## 2025-05-28 RX ORDER — BUPROPION HYDROCHLORIDE 100 MG/1
100 TABLET ORAL 2 TIMES DAILY
Qty: 180 TABLET | Refills: 0 | Status: SHIPPED | OUTPATIENT
Start: 2025-05-28

## 2025-06-03 ENCOUNTER — OFFICE VISIT (OUTPATIENT)
Dept: FAMILY MEDICINE CLINIC | Age: 75
End: 2025-06-03
Payer: MEDICARE

## 2025-06-03 VITALS
HEIGHT: 62 IN | OXYGEN SATURATION: 98 % | SYSTOLIC BLOOD PRESSURE: 136 MMHG | WEIGHT: 231.4 LBS | TEMPERATURE: 97.9 F | HEART RATE: 76 BPM | DIASTOLIC BLOOD PRESSURE: 61 MMHG | BODY MASS INDEX: 42.58 KG/M2

## 2025-06-03 DIAGNOSIS — E11.9 TYPE 2 DIABETES MELLITUS WITHOUT COMPLICATION, WITHOUT LONG-TERM CURRENT USE OF INSULIN: ICD-10-CM

## 2025-06-03 DIAGNOSIS — J40 BRONCHITIS: Primary | ICD-10-CM

## 2025-06-03 RX ORDER — ALBUTEROL SULFATE 90 UG/1
2 INHALANT RESPIRATORY (INHALATION) EVERY 4 HOURS PRN
Qty: 18 G | Refills: 0 | Status: SHIPPED | OUTPATIENT
Start: 2025-06-03

## 2025-06-03 RX ORDER — AZITHROMYCIN 250 MG/1
TABLET, FILM COATED ORAL
Qty: 6 TABLET | Refills: 0 | Status: SHIPPED | OUTPATIENT
Start: 2025-06-03

## 2025-06-03 RX ORDER — BENZONATATE 100 MG/1
100 CAPSULE ORAL 3 TIMES DAILY PRN
Qty: 40 CAPSULE | Refills: 0 | Status: SHIPPED | OUTPATIENT
Start: 2025-06-03

## 2025-06-03 NOTE — ASSESSMENT & PLAN NOTE
Will get her set up with dietician for nutrition advice that would be beneficial for diabetes and weight.   Orders:    Ambulatory Referral to Nutrition Services

## 2025-06-03 NOTE — PROGRESS NOTES
Chief Complaint  Kim Owen presents to [facility] for Cough (X 1 month)    Subjective          History of Present Illness    Kim is here today with symptoms that started one month ago. Started with dry cough. She thought that this was related to allergies initially.  She tried taking Benadryl, Zyrtec. Has not developed yellow sputum. Denies fever. She has heard herself wheezing. No shortness of breath unless associated with cough. She has tried taking Tussin.   She has had some weight gain. Wonders what she can do. She does have diabetes. Has been taking metformin. Last A1C 6.5. She has not been able to exercise much due to pain.      Review of Systems      Allergies   Allergen Reactions    Adhesive Tape Rash     BLISTERS/RASH TO SKIN W/TKA 1/2023      Past Medical History:   Diagnosis Date    Chronic pain     BACK, NO LONGER SEES PM CLINIC    Diabetes     Essential (primary) hypertension     Hyperlipidemia, unspecified     Hypothyroidism, unspecified     Low back pain     Major depressive disorder, recurrent, moderate     Primary generalized (osteo)arthritis     L KNEE    Michael Mountain spotted fever     DX 2014 NO CURRENT S/S    Spinal stenosis, lumbar region without neurogenic claudication     Torn rotator cuff     RIGHT    Vitamin D deficiency, unspecified     Zoster without complications      Current Outpatient Medications   Medication Sig Dispense Refill    amLODIPine (NORVASC) 2.5 MG tablet Take 1 tablet by mouth once daily 90 tablet 0    atorvastatin (LIPITOR) 10 MG tablet Take 1 tablet by mouth every night at bedtime. 90 tablet 1    buPROPion (WELLBUTRIN) 100 MG tablet Take 1 tablet by mouth twice daily 180 tablet 0    chlorthalidone (HYGROTON) 25 MG tablet Take 1 tablet by mouth once daily 90 tablet 0    escitalopram (LEXAPRO) 10 MG tablet Take 1 tablet by mouth once daily 90 tablet 0    levothyroxine (SYNTHROID, LEVOTHROID) 25 MCG tablet Take 1 tablet by mouth  Daily. 90 tablet 1    losartan (COZAAR) 100 MG tablet Take 1 tablet by mouth once daily 90 tablet 0    metFORMIN ER (GLUCOPHAGE-XR) 500 MG 24 hr tablet Take 1 tablet by mouth 2 (Two) Times a Day. 180 tablet 1    metoprolol succinate XL (TOPROL-XL) 25 MG 24 hr tablet Take 1 tablet by mouth Daily. 90 tablet 0    multivitamin with minerals tablet tablet Take 1 tablet by mouth Daily.      albuterol sulfate  (90 Base) MCG/ACT inhaler Inhale 2 puffs Every 4 (Four) Hours As Needed for Wheezing or Shortness of Air. 18 g 0    azithromycin (Zithromax Z-Saul) 250 MG tablet Take 2 tablets by mouth on day 1, then 1 tablet daily on days 2-5 6 tablet 0    benzonatate (Tessalon Perles) 100 MG capsule Take 1 capsule by mouth 3 (Three) Times a Day As Needed for Cough. 40 capsule 0     No current facility-administered medications for this visit.     Past Surgical History:   Procedure Laterality Date    COLONOSCOPY  2012    HYSTERECTOMY      AGE 48  ENDOMETRIOSIS, MENORRHAGIA    JOINT REPLACEMENT Right 2016    KNEE    SPINE SURGERY      L5-S1    DR. DEAN HONEYCUTT  5/21    TOTAL KNEE ARTHROPLASTY Left 1/10/2023    Procedure: LEFT TOTAL KNEE ARTHROPLASTY WITH JANET ROBOT;  Surgeon: Lexa Ruano MD;  Location: [facility] MAIN OR;  Service: Orthopedics;  Laterality: Left;    TOTAL SHOULDER ARTHROPLASTY W/ DISTAL CLAVICLE EXCISION Right 7/27/2023    Procedure: TOTAL SHOULDER REVERSE ARTHROPLASTY;  Surgeon: Lexa Ruano MD;  Location: [facility] MAIN OR;  Service: Orthopedics;  Laterality: Right;      Social History     Tobacco Use    Smoking status: Never     Passive exposure: Past    Smokeless tobacco: Never   Vaping Use    Vaping status: Never Used   Substance Use Topics    Alcohol use: Not Currently    Drug use: Never     Family History   Problem Relation Age of Onset    Diabetes type II Father     Alcohol abuse Father     Malig Hyperthermia Neg Hx      Health Maintenance Due   Topic Date Due    DIABETIC EYE EXAM  12/02/2021     "  Immunization History   Administered Date(s) Administered    COVID-19 (PFIZER) 12YRS+ (COMIRNATY) 02/13/2025    COVID-19 (PFIZER) BIVALENT 12+YRS 12/14/2022    COVID-19 (PFIZER) Purple Cap Monovalent 03/15/2021, 04/05/2021, 12/02/2021    Covid-19 (Pfizer) Gray Cap Monovalent 07/19/2022    Fluzone High-Dose 65+YRS 11/22/2024    Fluzone High-Dose 65+yrs 10/18/2021, 10/31/2022, 09/26/2023    Influenza, Unspecified 12/02/2020    Pneumococcal Conjugate 20-Valent (PCV20) 07/19/2022    Pneumococcal Polysaccharide (PPSV23) 12/02/2020    Shingrix 09/26/2023        Objective     Vitals:    06/03/25 1046   BP: 136/61   Pulse: 76   Temp: 97.9 °F (36.6 °C)   TempSrc: Oral   SpO2: 98%   Weight: 105 kg (231 lb 6.4 oz)   Height: 157.5 cm (62\")     Body mass index is 42.32 kg/m².                No results found.    Physical Exam  Vitals reviewed.   Constitutional:       General: She is not in acute distress.     Appearance: Normal appearance. She is well-developed.   HENT:      Head: Normocephalic and atraumatic.      Right Ear: Tympanic membrane and ear canal normal.      Left Ear: Tympanic membrane and ear canal normal.      Nose: Congestion present.      Mouth/Throat:      Mouth: Mucous membranes are moist.      Comments: Uvula midline, PND  Eyes:      Extraocular Movements: Extraocular movements intact.      Pupils: Pupils are equal, round, and reactive to light.   Cardiovascular:      Rate and Rhythm: Normal rate and regular rhythm.   Pulmonary:      Effort: Pulmonary effort is normal.      Breath sounds: Normal breath sounds.   Neurological:      Mental Status: She is alert and oriented to person, place, and time.   Psychiatric:         Mood and Affect: Mood and affect normal.           Result Review :                               Assessment and Plan      Assessment & Plan  Bronchitis  Will treat for bronchitis with rescue inhaler and cough medication. Will cover for PNA with antibiotics with symptoms.   Orders:    " azithromycin (Zithromax Z-Saul) 250 MG tablet; Take 2 tablets by mouth on day 1, then 1 tablet daily on days 2-5    albuterol sulfate  (90 Base) MCG/ACT inhaler; Inhale 2 puffs Every 4 (Four) Hours As Needed for Wheezing or Shortness of Air.    benzonatate (Tessalon Perles) 100 MG capsule; Take 1 capsule by mouth 3 (Three) Times a Day As Needed for Cough.    Type 2 diabetes mellitus without complication, without long-term current use of insulin    Will get her set up with dietician for nutrition advice that would be beneficial for diabetes and weight.   Orders:    Ambulatory Referral to Nutrition Services              Follow Up     Return for Next scheduled follow up, Or sooner as needed.

## 2025-06-25 ENCOUNTER — NUTRITION (OUTPATIENT)
Dept: DIABETES SERVICES | Facility: HOSPITAL | Age: 75
End: 2025-06-25
Payer: MEDICARE

## 2025-06-25 DIAGNOSIS — E11.9 TYPE 2 DIABETES MELLITUS WITHOUT COMPLICATION, WITHOUT LONG-TERM CURRENT USE OF INSULIN: Primary | ICD-10-CM

## 2025-06-25 PROCEDURE — 97802 MEDICAL NUTRITION INDIV IN: CPT | Performed by: DIETITIAN, REGISTERED

## 2025-07-08 ENCOUNTER — TELEPHONE (OUTPATIENT)
Dept: FAMILY MEDICINE CLINIC | Age: 75
End: 2025-07-08

## 2025-07-08 ENCOUNTER — OFFICE VISIT (OUTPATIENT)
Dept: FAMILY MEDICINE CLINIC | Age: 75
End: 2025-07-08
Payer: MEDICARE

## 2025-07-08 ENCOUNTER — LAB (OUTPATIENT)
Dept: LAB | Facility: HOSPITAL | Age: 75
End: 2025-07-08
Payer: MEDICARE

## 2025-07-08 ENCOUNTER — HOSPITAL ENCOUNTER (OUTPATIENT)
Dept: GENERAL RADIOLOGY | Facility: HOSPITAL | Age: 75
Discharge: HOME | End: 2025-07-08
Payer: MEDICARE

## 2025-07-08 ENCOUNTER — RESULTS FOLLOW-UP (OUTPATIENT)
Dept: FAMILY MEDICINE CLINIC | Age: 75
End: 2025-07-08

## 2025-07-08 VITALS
BODY MASS INDEX: 41.18 KG/M2 | HEART RATE: 76 BPM | WEIGHT: 223.8 LBS | SYSTOLIC BLOOD PRESSURE: 138 MMHG | TEMPERATURE: 97.8 F | DIASTOLIC BLOOD PRESSURE: 81 MMHG | HEIGHT: 62 IN | OXYGEN SATURATION: 96 %

## 2025-07-08 DIAGNOSIS — R05.3 CHRONIC COUGH: ICD-10-CM

## 2025-07-08 DIAGNOSIS — E78.2 MIXED HYPERLIPIDEMIA: ICD-10-CM

## 2025-07-08 DIAGNOSIS — E11.9 TYPE 2 DIABETES MELLITUS WITHOUT COMPLICATION, WITHOUT LONG-TERM CURRENT USE OF INSULIN: Primary | ICD-10-CM

## 2025-07-08 DIAGNOSIS — E03.9 HYPOTHYROIDISM, UNSPECIFIED TYPE: ICD-10-CM

## 2025-07-08 DIAGNOSIS — E11.9 TYPE 2 DIABETES MELLITUS WITHOUT COMPLICATION, WITHOUT LONG-TERM CURRENT USE OF INSULIN: ICD-10-CM

## 2025-07-08 DIAGNOSIS — I10 ESSENTIAL (PRIMARY) HYPERTENSION: ICD-10-CM

## 2025-07-08 DIAGNOSIS — R05.3 CHRONIC COUGH: Primary | ICD-10-CM

## 2025-07-08 DIAGNOSIS — Z79.2 NEED FOR ANTIBIOTIC PROPHYLAXIS FOR DENTAL PROCEDURE: ICD-10-CM

## 2025-07-08 DIAGNOSIS — F33.1 MAJOR DEPRESSIVE DISORDER, RECURRENT, MODERATE: ICD-10-CM

## 2025-07-08 LAB
ALBUMIN SERPL-MCNC: 4.2 G/DL (ref 3.5–5.2)
ALBUMIN/GLOB SERPL: 1.3 G/DL
ALP SERPL-CCNC: 71 U/L (ref 39–117)
ALT SERPL W P-5'-P-CCNC: 35 U/L (ref 1–33)
ANION GAP SERPL CALCULATED.3IONS-SCNC: 12.4 MMOL/L (ref 5–15)
AST SERPL-CCNC: 34 U/L (ref 1–32)
BILIRUB SERPL-MCNC: 0.5 MG/DL (ref 0–1.2)
BUN SERPL-MCNC: 18 MG/DL (ref 8–23)
BUN/CREAT SERPL: 17.3 (ref 7–25)
CALCIUM SPEC-SCNC: 10.3 MG/DL (ref 8.6–10.5)
CHLORIDE SERPL-SCNC: 92 MMOL/L (ref 98–107)
CO2 SERPL-SCNC: 27.6 MMOL/L (ref 22–29)
CREAT SERPL-MCNC: 1.04 MG/DL (ref 0.57–1)
EGFRCR SERPLBLD CKD-EPI 2021: 56.9 ML/MIN/1.73
GLOBULIN UR ELPH-MCNC: 3.3 GM/DL
GLUCOSE SERPL-MCNC: 133 MG/DL (ref 65–99)
HBA1C MFR BLD: 6.2 % (ref 4.8–5.6)
POTASSIUM SERPL-SCNC: 3.3 MMOL/L (ref 3.5–5.2)
PROT SERPL-MCNC: 7.5 G/DL (ref 6–8.5)
SODIUM SERPL-SCNC: 132 MMOL/L (ref 136–145)
TSH SERPL DL<=0.05 MIU/L-ACNC: 2.77 UIU/ML (ref 0.27–4.2)

## 2025-07-08 PROCEDURE — 80053 COMPREHEN METABOLIC PANEL: CPT

## 2025-07-08 PROCEDURE — 3079F DIAST BP 80-89 MM HG: CPT | Performed by: NURSE PRACTITIONER

## 2025-07-08 PROCEDURE — 1160F RVW MEDS BY RX/DR IN RCRD: CPT | Performed by: NURSE PRACTITIONER

## 2025-07-08 PROCEDURE — 36415 COLL VENOUS BLD VENIPUNCTURE: CPT

## 2025-07-08 PROCEDURE — 83036 HEMOGLOBIN GLYCOSYLATED A1C: CPT

## 2025-07-08 PROCEDURE — 3044F HG A1C LEVEL LT 7.0%: CPT | Performed by: NURSE PRACTITIONER

## 2025-07-08 PROCEDURE — 1159F MED LIST DOCD IN RCRD: CPT | Performed by: NURSE PRACTITIONER

## 2025-07-08 PROCEDURE — 84443 ASSAY THYROID STIM HORMONE: CPT

## 2025-07-08 PROCEDURE — 71046 X-RAY EXAM CHEST 2 VIEWS: CPT

## 2025-07-08 PROCEDURE — G2211 COMPLEX E/M VISIT ADD ON: HCPCS | Performed by: NURSE PRACTITIONER

## 2025-07-08 PROCEDURE — 3075F SYST BP GE 130 - 139MM HG: CPT | Performed by: NURSE PRACTITIONER

## 2025-07-08 PROCEDURE — 99214 OFFICE O/P EST MOD 30 MIN: CPT | Performed by: NURSE PRACTITIONER

## 2025-07-08 RX ORDER — BUPROPION HYDROCHLORIDE 100 MG/1
100 TABLET ORAL 2 TIMES DAILY
Qty: 180 TABLET | Refills: 1 | Status: SHIPPED | OUTPATIENT
Start: 2025-07-08

## 2025-07-08 RX ORDER — METOPROLOL SUCCINATE 25 MG/1
25 TABLET, EXTENDED RELEASE ORAL DAILY
Qty: 90 TABLET | Refills: 1 | Status: SHIPPED | OUTPATIENT
Start: 2025-07-08

## 2025-07-08 RX ORDER — AMLODIPINE BESYLATE 2.5 MG/1
2.5 TABLET ORAL DAILY
Qty: 90 TABLET | Refills: 1 | Status: SHIPPED | OUTPATIENT
Start: 2025-07-08

## 2025-07-08 RX ORDER — AMOXICILLIN 500 MG/1
CAPSULE ORAL
Qty: 4 CAPSULE | Refills: 0 | Status: SHIPPED | OUTPATIENT
Start: 2025-07-08

## 2025-07-08 RX ORDER — LOSARTAN POTASSIUM 100 MG/1
100 TABLET ORAL DAILY
Qty: 90 TABLET | Refills: 1 | Status: SHIPPED | OUTPATIENT
Start: 2025-07-08

## 2025-07-08 RX ORDER — LEVOTHYROXINE SODIUM 25 UG/1
25 TABLET ORAL DAILY
Qty: 90 TABLET | Refills: 1 | Status: SHIPPED | OUTPATIENT
Start: 2025-07-08

## 2025-07-08 RX ORDER — METFORMIN HYDROCHLORIDE 500 MG/1
500 TABLET, EXTENDED RELEASE ORAL 2 TIMES DAILY
Qty: 180 TABLET | Refills: 1 | Status: SHIPPED | OUTPATIENT
Start: 2025-07-08

## 2025-07-08 RX ORDER — ESCITALOPRAM OXALATE 10 MG/1
10 TABLET ORAL DAILY
Qty: 90 TABLET | Refills: 1 | Status: SHIPPED | OUTPATIENT
Start: 2025-07-08

## 2025-07-08 RX ORDER — ATORVASTATIN CALCIUM 10 MG/1
10 TABLET, FILM COATED ORAL
Qty: 90 TABLET | Refills: 1 | Status: SHIPPED | OUTPATIENT
Start: 2025-07-08

## 2025-07-08 RX ORDER — CHLORTHALIDONE 25 MG/1
25 TABLET ORAL DAILY
Qty: 90 TABLET | Refills: 1 | Status: SHIPPED | OUTPATIENT
Start: 2025-07-08

## 2025-07-08 NOTE — ASSESSMENT & PLAN NOTE
{Depression A/P Block (Optional):02386}  Medical condition is stable.  Continue same therapy.  Will recheck at next regular appointment    Orders:    buPROPion (WELLBUTRIN) 100 MG tablet; Take 1 tablet by mouth 2 (Two) Times a Day.    escitalopram (LEXAPRO) 10 MG tablet; Take 1 tablet by mouth Daily.

## 2025-07-08 NOTE — ASSESSMENT & PLAN NOTE
{Hyperlipidemia A/P Block (Optional):4949739837}  Medical condition is stable.  Continue same therapy.  Will recheck at next regular appointment    Orders:    atorvastatin (LIPITOR) 10 MG tablet; Take 1 tablet by mouth every night at bedtime.

## 2025-07-08 NOTE — ASSESSMENT & PLAN NOTE
{Diabetes (Optional):2178153227}  Medical condition is stable.  Continue same therapy.  Will recheck at next regular appointment    Orders:    metFORMIN ER (GLUCOPHAGE-XR) 500 MG 24 hr tablet; Take 1 tablet by mouth 2 (Two) Times a Day.    Comprehensive metabolic panel; Future    Hemoglobin A1c; Future

## 2025-07-08 NOTE — PROGRESS NOTES
Chief Complaint  Kim Owen presents to [facility] for Hypertension, Cough, and Diabetes    Subjective          History of Present Illness    Kim is here today to follow up on HTN. She is on losartan, chlorthalidone, Toprol XL.  On atorvastatin for hyperlipidemia. Tolerating well.   Taking Metformin for diabetes. Last A1C 6.5. Reports UTD eye exam with [facility] in [place] - Dr Del Castillo.   Reports that she is going to be having cataract surgery next month.   On levothyroxine for hypothyroidism. Last TSH normal.   Osteopenia on prior bone density scan. Was previously taking calcium but was taken off by previous provider. She reports getting plenty of calcium in diet. Taking Vitamin D3 daily. Also takes MVI. Most recent Dexa on 5/2/25 with normal bone mineral density.   Taking Wellbutrin and Lexapro for depression. She previously took zoloft but did not find effective.  Kim continues to complain of cough. This started about two months ago. Initially thought related to allergies. Tried taking Benadryl, Zyrtec. She has had some drainage as well. Feels like she looses her breath when coughing. Coughing up yellow sputum. She has been taking Robitussin DM. Has previously tried Tussin. She completed a course of Zithromax. Reports no improvement with albuterol inhaler or tessalon perles.     Review of Systems      Allergies   Allergen Reactions    Adhesive Tape Rash     BLISTERS/RASH TO SKIN W/TKA 1/2023      Past Medical History:   Diagnosis Date    Chronic pain     BACK, NO LONGER SEES PM CLINIC    Diabetes     Essential (primary) hypertension     Hyperlipidemia, unspecified     Hypothyroidism, unspecified     Low back pain     Major depressive disorder, recurrent, moderate     Primary generalized (osteo)arthritis     L KNEE    Michael Mountain spotted fever     DX 2014 NO CURRENT S/S    Spinal stenosis, lumbar region without neurogenic claudication     Torn rotator cuff     RIGHT     Vitamin D deficiency, unspecified     Zoster without complications      Current Outpatient Medications   Medication Sig Dispense Refill    albuterol sulfate  (90 Base) MCG/ACT inhaler Inhale 2 puffs Every 4 (Four) Hours As Needed for Wheezing or Shortness of Air. 18 g 0    amLODIPine (NORVASC) 2.5 MG tablet Take 1 tablet by mouth Daily. 90 tablet 1    atorvastatin (LIPITOR) 10 MG tablet Take 1 tablet by mouth every night at bedtime. 90 tablet 1    benzonatate (Tessalon Perles) 100 MG capsule Take 1 capsule by mouth 3 (Three) Times a Day As Needed for Cough. 40 capsule 0    buPROPion (WELLBUTRIN) 100 MG tablet Take 1 tablet by mouth 2 (Two) Times a Day. 180 tablet 1    chlorthalidone (HYGROTON) 25 MG tablet Take 1 tablet by mouth Daily. 90 tablet 1    escitalopram (LEXAPRO) 10 MG tablet Take 1 tablet by mouth Daily. 90 tablet 1    levothyroxine (SYNTHROID, LEVOTHROID) 25 MCG tablet Take 1 tablet by mouth Daily. 90 tablet 1    losartan (COZAAR) 100 MG tablet Take 1 tablet by mouth Daily. 90 tablet 1    metFORMIN ER (GLUCOPHAGE-XR) 500 MG 24 hr tablet Take 1 tablet by mouth 2 (Two) Times a Day. 180 tablet 1    metoprolol succinate XL (TOPROL-XL) 25 MG 24 hr tablet Take 1 tablet by mouth Daily. 90 tablet 1    multivitamin with minerals tablet tablet Take 1 tablet by mouth Daily.      amoxicillin (AMOXIL) 500 MG capsule Take 4 tablets one hour prior to procedure 4 capsule 0     No current facility-administered medications for this visit.     Past Surgical History:   Procedure Laterality Date    COLONOSCOPY 2012    HYSTERECTOMY      AGE 48  ENDOMETRIOSIS, MENORRHAGIA    JOINT REPLACEMENT Right 2016    KNEE    SPINE SURGERY      L5-S1    DR. DEAN HONEYCUTT  5/21    TOTAL KNEE ARTHROPLASTY Left 1/10/2023    Procedure: LEFT TOTAL KNEE ARTHROPLASTY WITH JANET ROBOT;  Surgeon: Lexa Ruano MD;  Location: [facility] MAIN OR;  Service: Orthopedics;  Laterality: Left;    TOTAL SHOULDER ARTHROPLASTY W/ DISTAL CLAVICLE  "EXCISION Right 7/27/2023    Procedure: TOTAL SHOULDER REVERSE ARTHROPLASTY;  Surgeon: Lexa Ruano MD;  Location: [facility] MAIN OR;  Service: Orthopedics;  Laterality: Right;      Social History     Tobacco Use    Smoking status: Never     Passive exposure: Past    Smokeless tobacco: Never   Vaping Use    Vaping status: Never Used   Substance Use Topics    Alcohol use: Not Currently    Drug use: Never     Family History   Problem Relation Age of Onset    Diabetes type II Father     Alcohol abuse Father     Malig Hyperthermia Neg Hx      Health Maintenance Due   Topic Date Due    DIABETIC EYE EXAM  12/02/2021      Immunization History   Administered Date(s) Administered    COVID-19 (PFIZER) 12YRS+ (COMIRNATY) 02/13/2025    COVID-19 (PFIZER) BIVALENT 12+YRS 12/14/2022    COVID-19 (PFIZER) Purple Cap Monovalent 03/15/2021, 04/05/2021, 12/02/2021    Covid-19 (Pfizer) Gray Cap Monovalent 07/19/2022    Fluzone High-Dose 65+YRS 11/22/2024    Fluzone High-Dose 65+yrs 10/18/2021, 10/31/2022, 09/26/2023    Influenza, Unspecified 12/02/2020    Pneumococcal Conjugate 20-Valent (PCV20) 07/19/2022    Pneumococcal Polysaccharide (PPSV23) 12/02/2020    Shingrix 09/26/2023        Objective     Vitals:    07/08/25 0905   BP: 138/81   Pulse: 76   Temp: 97.8 °F (36.6 °C)   TempSrc: Oral   SpO2: 96%   Weight: 102 kg (223 lb 12.8 oz)   Height: 157.5 cm (62\")     Body mass index is 40.93 kg/m².                No results found.    Physical Exam  Vitals reviewed.   Constitutional:       General: She is not in acute distress.     Appearance: Normal appearance. She is well-developed.   HENT:      Head: Normocephalic and atraumatic.   Cardiovascular:      Rate and Rhythm: Normal rate and regular rhythm.   Pulmonary:      Effort: Pulmonary effort is normal.      Breath sounds: Normal breath sounds.   Neurological:      Mental Status: She is alert and oriented to person, place, and time.   Psychiatric:         Mood and Affect: Mood and affect " normal.           Result Review :                               Assessment and Plan      Assessment & Plan  Type 2 diabetes mellitus without complication, without long-term current use of insulin    Medical condition is stable.  Continue same therapy.  Will recheck at next regular appointment    Orders:    metFORMIN ER (GLUCOPHAGE-XR) 500 MG 24 hr tablet; Take 1 tablet by mouth 2 (Two) Times a Day.    Comprehensive metabolic panel; Future    Hemoglobin A1c; Future    Chronic cough  Will proceed with cxray. Further recommendations to follow.   Orders:    XR Chest PA & Lateral; Future    Essential (primary) hypertension  Hypertension is stable and controlled  Continue current treatment regimen.  Blood pressure will be reassessed in 6 months.    Orders:    amLODIPine (NORVASC) 2.5 MG tablet; Take 1 tablet by mouth Daily.    chlorthalidone (HYGROTON) 25 MG tablet; Take 1 tablet by mouth Daily.    losartan (COZAAR) 100 MG tablet; Take 1 tablet by mouth Daily.    metoprolol succinate XL (TOPROL-XL) 25 MG 24 hr tablet; Take 1 tablet by mouth Daily.    Mixed hyperlipidemia     Medical condition is stable.  Continue same therapy.  Will recheck at next regular appointment    Orders:    atorvastatin (LIPITOR) 10 MG tablet; Take 1 tablet by mouth every night at bedtime.    Major depressive disorder, recurrent, moderate    Medical condition is stable.  Continue same therapy.  Will recheck at next regular appointment    Orders:    buPROPion (WELLBUTRIN) 100 MG tablet; Take 1 tablet by mouth 2 (Two) Times a Day.    escitalopram (LEXAPRO) 10 MG tablet; Take 1 tablet by mouth Daily.    Hypothyroidism, unspecified type  Medical condition is stable.  Continue same therapy.  Will recheck at next regular appointment    Orders:    levothyroxine (SYNTHROID, LEVOTHROID) 25 MCG tablet; Take 1 tablet by mouth Daily.    TSH; Future    Need for antibiotic prophylaxis for dental procedure    Orders:    amoxicillin (AMOXIL) 500 MG capsule; Take  4 tablets one hour prior to procedure              Follow Up     Return in about 6 months (around 1/8/2026) for Medicare Wellness, Or sooner as needed.

## 2025-07-08 NOTE — ASSESSMENT & PLAN NOTE
Medical condition is stable.  Continue same therapy.  Will recheck at next regular appointment    Orders:    levothyroxine (SYNTHROID, LEVOTHROID) 25 MCG tablet; Take 1 tablet by mouth Daily.    TSH; Future

## 2025-07-08 NOTE — ASSESSMENT & PLAN NOTE
Hypertension is stable and controlled  Continue current treatment regimen.  Blood pressure will be reassessed in 6 months.    Orders:    amLODIPine (NORVASC) 2.5 MG tablet; Take 1 tablet by mouth Daily.    chlorthalidone (HYGROTON) 25 MG tablet; Take 1 tablet by mouth Daily.    losartan (COZAAR) 100 MG tablet; Take 1 tablet by mouth Daily.    metoprolol succinate XL (TOPROL-XL) 25 MG 24 hr tablet; Take 1 tablet by mouth Daily.

## 2025-07-10 DIAGNOSIS — E87.6 HYPOKALEMIA: Primary | ICD-10-CM

## 2025-07-10 RX ORDER — POTASSIUM CHLORIDE 750 MG/1
10 TABLET, EXTENDED RELEASE ORAL DAILY
Qty: 7 TABLET | Refills: 0 | Status: SHIPPED | OUTPATIENT
Start: 2025-07-10

## 2025-07-18 ENCOUNTER — LAB (OUTPATIENT)
Dept: LAB | Facility: HOSPITAL | Age: 75
End: 2025-07-18
Payer: MEDICARE

## 2025-07-18 DIAGNOSIS — E87.6 HYPOKALEMIA: ICD-10-CM

## 2025-07-18 LAB
ANION GAP SERPL CALCULATED.3IONS-SCNC: 14.9 MMOL/L (ref 5–15)
BUN SERPL-MCNC: 15 MG/DL (ref 8–23)
BUN/CREAT SERPL: 13.8 (ref 7–25)
CALCIUM SPEC-SCNC: 10.3 MG/DL (ref 8.6–10.5)
CHLORIDE SERPL-SCNC: 91 MMOL/L (ref 98–107)
CO2 SERPL-SCNC: 28.1 MMOL/L (ref 22–29)
CREAT SERPL-MCNC: 1.09 MG/DL (ref 0.57–1)
EGFRCR SERPLBLD CKD-EPI 2021: 53.8 ML/MIN/1.73
GLUCOSE SERPL-MCNC: 128 MG/DL (ref 65–99)
POTASSIUM SERPL-SCNC: 3.5 MMOL/L (ref 3.5–5.2)
SODIUM SERPL-SCNC: 134 MMOL/L (ref 136–145)

## 2025-07-18 PROCEDURE — 36415 COLL VENOUS BLD VENIPUNCTURE: CPT

## 2025-07-18 PROCEDURE — 80048 BASIC METABOLIC PNL TOTAL CA: CPT

## 2025-07-22 ENCOUNTER — RESULTS FOLLOW-UP (OUTPATIENT)
Dept: FAMILY MEDICINE CLINIC | Age: 75
End: 2025-07-22
Payer: MEDICARE

## 2025-07-22 DIAGNOSIS — E87.6 HYPOKALEMIA: Primary | ICD-10-CM

## 2025-07-30 VITALS — BODY MASS INDEX: 42.51 KG/M2 | WEIGHT: 231 LBS | HEIGHT: 62 IN

## 2025-07-30 NOTE — PROGRESS NOTES
"  Kim Owen is a 75 y.o. female who presents to [facility] for nutrition consult r/t diagnosis of T2DM, pt states she was diagnosed 1 year ago.  Kim Owen is referred by MINGO Erickson.    Past Medical History:   Diagnosis Date    Chronic pain     BACK, NO LONGER SEES [facility] CL[facility]    Diabetes     Essential (primary) hypertension     Hyperlipidemia, unspecified     Hypothyroidism, unspecified     Low back pain     Major depressive disorder, recurrent, moderate     Primary generalized (osteo)arthritis     L KNEE    [place] spotted fever     DX 2014 NO CURRENT S/S    Spinal stenosis, lumbar region without neurogenic claudication     Torn rotator cuff     RIGHT    Vitamin D deficiency, unspecified     Zoster without complications        Anthropometrics    157.5 cm (62\")  105 kg (231 lb)  42.25 kg/m²    Diabetes History    Diabetes History  What type of diabetes do you have?: Type 2  Length of Diabetes Diagnosis: 1 - 5 years  Current DM knowledge: good  Have you had diabetes education/teaching in the past?: no  Do you test your blood sugar at home?: no    Education Preferences    Education Preferences  What areas of diabetes would you like to learn about?: diet information    Nutrition Information    Nutrition Information  Enter everything you can remember eating in the last 24 hours (1 day): breakfast- cereal/something sweet; usually skips lunch; dinner- meat, vegetable, less starch; snacks- PB crackers, fruit; beverages- water, whole milk, diet soda  What is the biggest challenge you have with your diet?: Knowledge    Education Needs    DM Education Needs  Medication: Oral  Healthy Eating: RD consult, Reviewed meal plan, Basic meal plan provided  Physical Activity: Walking  Physical Activity Frequency: Occasionally  Motivation: Engaged  Teaching Method: Explanation, Discussion, Handouts  Patient Response: Verbalized understanding    DM Goals    DM Goals  Healthy Eating " - Goal: Today  Being Active - Goal: Today  Taking Medication - Goal: Today      Medications    Current Outpatient Medications   Medication    albuterol sulfate HFA    amLODIPine    amoxicillin    atorvastatin    benzonatate    buPROPion    chlorthalidone    escitalopram    levothyroxine    losartan    metFORMIN ER    metoprolol succinate XL    multivitamin with minerals    potassium chloride     Labs       Lab Results   Component Value Date    CHOL 130 01/03/2025    TRIG 174 (H) 01/03/2025    HDL 41 01/03/2025    LDL 60 01/03/2025     Nutrition counseling provided on carbohydrate counting, portion control, measuring and reading labels.  Discussed eating out and gave suggestions on controlling carbohydrate intake and making healthier food choices.     Meal Plan:     Total Carbohydrates per meal: 2-3 carb servings/meal, 3 meals/day  Lean protein with meals.  Limit added fats.  Snacks: 1 carbohydrate serving (</= 22 g) + 1 protein serving.     Daily exercise encouraged (as recommended by healthcare provider). Discussed the benefits of exercise in lowering blood glucose, blood pressure, cholesterol, stress and controlling body weight.     Discussed blood glucose monitoring to assist with understanding of factors affecting blood glucose and assist with management of diabetes.  Discussed and provided with target BG ranges.     Literature provided: Diabetes Nutrition Placemat, Choose Your Foods Booklet    Dietitian contact number provided.  Patient encouraged to call with questions or concerns.     Time spent with patient: 30 minutes    Elo Valdez RDN, ARI  06/25/2025

## 2025-08-01 ENCOUNTER — TELEPHONE (OUTPATIENT)
Dept: FAMILY MEDICINE CLINIC | Age: 75
End: 2025-08-01
Payer: MEDICARE

## 2025-08-04 RX ORDER — AMOXICILLIN 500 MG/1
CAPSULE ORAL
Qty: 4 CAPSULE | Refills: 0 | Status: SHIPPED | OUTPATIENT
Start: 2025-08-04

## (undated) DEVICE — TOWEL,OR,DSP,ST,BLUE,STD,4/PK,20PK/CS: Brand: MEDLINE

## (undated) DEVICE — 40585 XL ADVANCED TRENDELENBURG POSITIONING KIT: Brand: 40585 XL ADVANCED TRENDELENBURG POSITIONING KIT

## (undated) DEVICE — DRSNG SURESITE WNDW 4X4.5

## (undated) DEVICE — PROXIMATE RH ROTATING HEAD SKIN STAPLERS (35 WIDE) CONTAINS 35 STAINLESS STEEL STAPLES: Brand: PROXIMATE

## (undated) DEVICE — CVR LEG BOOTLEG F/R NOSKID 33IN

## (undated) DEVICE — GLV SURG SENSICARE SLT PF LF 8 STRL

## (undated) DEVICE — ENCORE® LATEX ORTHO SIZE 8, STERILE LATEX POWDER-FREE SURGICAL GLOVE: Brand: ENCORE

## (undated) DEVICE — POSTN HD UNIV

## (undated) DEVICE — SUT VIC 0 CT1 36IN J946H

## (undated) DEVICE — SLV SCD KN/LEN ADJ EXPRSS BLENDED MD 1P/U

## (undated) DEVICE — TOTAL KNEE-LF: Brand: MEDLINE INDUSTRIES, INC.

## (undated) DEVICE — MAT FLR ABS W/BLU/LINER 56X72IN WHT

## (undated) DEVICE — PEEL-AWAY TOGA, 2X LARGE: Brand: FLYTE

## (undated) DEVICE — APPL CHLORAPREP HI/LITE 26ML ORNG

## (undated) DEVICE — ELECTRD BLD EDGE COAT 3IN

## (undated) DEVICE — 450 ML BOTTLE OF 0.05% CHLORHEXIDINE GLUCONATE IN 99.95% STERILE WATER FOR IRRIGATION, USP AND APPLICATOR.: Brand: IRRISEPT ANTIMICROBIAL WOUND LAVAGE

## (undated) DEVICE — DRP SURG U/DRP INVISISHIELD PA 48X52IN

## (undated) DEVICE — SYR LUER SLPTP 50ML

## (undated) DEVICE — ELASTIC SHOULDER IMMOBILIZER: Brand: DEROYAL

## (undated) DEVICE — GLV SURG SENSICARE PI PF LF 7 GRN STRL

## (undated) DEVICE — UNDYED BRAIDED (POLYGLACTIN 910), SYNTHETIC ABSORBABLE SUTURE: Brand: COATED VICRYL

## (undated) DEVICE — FAN SPRAY KIT: Brand: PULSAVAC®

## (undated) DEVICE — ZIPPERED TOGA, PEEL-AWAY 2X LARGE: Brand: FLYTE, SURGICOOL

## (undated) DEVICE — SOL IRR NACL 0.9PCT BT 250ML

## (undated) DEVICE — SYR LUERLOK 30CC

## (undated) DEVICE — GLV SURG SENSICARE SLT PF LF 7 STRL

## (undated) DEVICE — DRSNG SURG AQUACEL AG/ADVNTGE 9X25CM 3.5X10IN

## (undated) DEVICE — SUT VIC PLS CTD BR 0 TIE 18IN VIL

## (undated) DEVICE — STRYKER PERFORMANCE SERIES SAGITTAL BLADE: Brand: STRYKER PERFORMANCE SERIES

## (undated) DEVICE — SCRW HEX PERSONA FML 2.5X25MM PK/2
Type: IMPLANTABLE DEVICE | Site: KNEE | Status: NON-FUNCTIONAL
Removed: 2023-01-10

## (undated) DEVICE — NO-SCRATCH ™ SMALL WHITNEY CURETTE ™ IS A SINGLE-USE, PLASTIC CURETTE FOR QUICKLY APPLYING, MANIPULATING AND REMOVING BONE CEMENT DURING HIP AND KNEE REPLACEMENT SURGERY. THE PLASTIC IS SOFTER THAN STEEL INSTRUMENTS, REDUCING THE RISK OF DAMAGING THE PROSTHESIS WITH METAL INSTRUMENTS.  THE CURETTE’S 6MM TIP REMOVES EXCESS CEMENT FROM REPLACEMENT HIPS AND KNEES. EASY-TO-MANEUVER, THE SMALL BLUE CURETTE LETS YOU REMOVE CEMENT FROM ALL EDGES OF THE PROSTHESIS.NO-SCRATCH WHITNEY SMALL CURETTE FEATURES:SAFER THAN STEEL- MADE OF PLASTIC - STURDY YET SOFTER THAN SURGICAL STEEL.HANDIER- EACH TOOL HAS A MOLDED-IN THUMB INDENTATION INSTANTLY ORIENTING THE TOOL.- EASIER TO MANEUVER IN HARD TO SEE PLACES.- COLOR-CODED FOR EASY IDENTIFICATION.FASTER- COMES INDIVIDUALLY PACKAGED IN STERILE, PEEL OPEN POUCH, READY TO GO.- APPLIES, MANIPULATES, OR REMOVES CEMENT WITH FINGERTIP PRECISION.ECONOMICAL- THE COST OF A SINGLE REVISION DWARFS THE COST OF A SINGLE-USE CURETTE. - DISPOSABLE – THERE’S NO NEED TO WASTE TIME REMOVING HARDENED CEMENT OR RE-STERILIZING TOOLS.- LESS EXPENSIVE TO BUY AND INVENTORY - ORDER ONLY THE TOOL YOU USE.- PACKAGED 25 INDIVIDUALLY WRAPPED TOOLS TO A CARTON FOR CONVENIENT SHELF STORAGE.: Brand: WHITNEY NO-SCRATCH CURETTE (SMALL)

## (undated) DEVICE — DRP ROBOTIC ROSA BX/20

## (undated) DEVICE — GLV SURG ULTRAFREE MAX LTX PF 8

## (undated) DEVICE — ESMARK: Brand: DEROYAL

## (undated) DEVICE — NDL HYPO ECLPS SFTY 18G 1 1/2IN

## (undated) DEVICE — PENCL E/S SMOKEEVAC W/TELESCP CANN

## (undated) DEVICE — SUT VIC UD BR COAT 0 CP2 27IN

## (undated) DEVICE — SOL IRR NACL 0.9PCT 3000ML

## (undated) DEVICE — 3M™ STERI-DRAPE™ U-DRAPE 1015: Brand: STERI-DRAPE™

## (undated) DEVICE — BASIC SINGLE BASIN-LF: Brand: MEDLINE INDUSTRIES, INC.

## (undated) DEVICE — INTENDED FOR TISSUE SEPARATION, AND OTHER PROCEDURES THAT REQUIRE A SHARP SURGICAL BLADE TO PUNCTURE OR CUT.: Brand: BARD-PARKER ® CARBON RIB-BACK BLADES

## (undated) DEVICE — BIPOLAR SEALER 23-112-1 AQM 6.0: Brand: AQUAMANTYS™

## (undated) DEVICE — PULLOVER TOGA, 2X LARGE: Brand: FLYTE, SURGICOOL

## (undated) DEVICE — DRSNG GZ PETROLTM XEROFORM CURAD 1X8IN STRL

## (undated) DEVICE — DRSNG PAD ABD 8X10IN STRL

## (undated) DEVICE — SUT ETHIB 1 X538H ETX538H

## (undated) DEVICE — Device

## (undated) DEVICE — GAUZE,SPONGE,4"X4",16PLY,STRL,LF,10/TRAY: Brand: MEDLINE

## (undated) DEVICE — Device: Brand: PULSAVAC®

## (undated) DEVICE — 3 BONE CEMENT MIXER: Brand: MIXEVAC

## (undated) DEVICE — DISPOSABLE TOURNIQUET CUFF SINGLE BLADDER, SINGLE PORT AND QUICK CONNECT CONNECTOR: Brand: COLOR CUFF

## (undated) DEVICE — STERILE POLYISOPRENE POWDER-FREE SURGICAL GLOVES: Brand: PROTEXIS